# Patient Record
Sex: MALE | Race: WHITE | URBAN - METROPOLITAN AREA
[De-identification: names, ages, dates, MRNs, and addresses within clinical notes are randomized per-mention and may not be internally consistent; named-entity substitution may affect disease eponyms.]

---

## 2018-01-02 ENCOUNTER — APPOINTMENT (RX ONLY)
Dept: URBAN - METROPOLITAN AREA CLINIC 348 | Facility: CLINIC | Age: 58
Setting detail: DERMATOLOGY
End: 2018-01-02

## 2018-01-02 DIAGNOSIS — L21.8 OTHER SEBORRHEIC DERMATITIS: ICD-10-CM

## 2018-01-02 DIAGNOSIS — Q84.3 ANONYCHIA: ICD-10-CM

## 2018-01-02 PROCEDURE — 99202 OFFICE O/P NEW SF 15 MIN: CPT

## 2018-01-02 PROCEDURE — ? COUNSELING

## 2018-01-02 PROCEDURE — ? PRESCRIPTION

## 2018-01-02 PROCEDURE — ? TREATMENT REGIMEN

## 2018-01-02 RX ORDER — HYDROCORTISONE 25 MG/G
CREAM TOPICAL
Qty: 1 | Refills: 2 | Status: ERX | COMMUNITY
Start: 2018-01-02

## 2018-01-02 RX ORDER — KETOCONAZOLE 20.5 MG/ML
SHAMPOO, SUSPENSION TOPICAL
Qty: 1 | Refills: 5 | Status: ERX | COMMUNITY
Start: 2018-01-02

## 2018-01-02 RX ADMIN — KETOCONAZOLE: 20.5 SHAMPOO, SUSPENSION TOPICAL at 00:00

## 2018-01-02 RX ADMIN — HYDROCORTISONE: 25 CREAM TOPICAL at 00:00

## 2018-01-02 ASSESSMENT — LOCATION DETAILED DESCRIPTION DERM
LOCATION DETAILED: LEFT INFERIOR MEDIAL MALAR CHEEK
LOCATION DETAILED: LEFT GREAT TOENAIL
LOCATION DETAILED: LEFT CENTRAL PARIETAL SCALP
LOCATION DETAILED: RIGHT CENTRAL PARIETAL SCALP
LOCATION DETAILED: RIGHT SUPERIOR MEDIAL BUCCAL CHEEK

## 2018-01-02 ASSESSMENT — LOCATION ZONE DERM
LOCATION ZONE: FACE
LOCATION ZONE: TOENAIL
LOCATION ZONE: SCALP

## 2018-01-02 ASSESSMENT — LOCATION SIMPLE DESCRIPTION DERM
LOCATION SIMPLE: RIGHT CHEEK
LOCATION SIMPLE: LEFT GREAT TOE
LOCATION SIMPLE: LEFT CHEEK
LOCATION SIMPLE: SCALP

## 2018-01-02 NOTE — PROCEDURE: TREATMENT REGIMEN
Modify Regimen: Add: ketoconazole shampoo was face and scalp everyday\\nHydrocortisone 2.5% cream apply to affected areas twice a day for 4 weeks
Detail Level: Zone

## 2018-01-31 ENCOUNTER — APPOINTMENT (RX ONLY)
Dept: URBAN - METROPOLITAN AREA CLINIC 348 | Facility: CLINIC | Age: 58
Setting detail: DERMATOLOGY
End: 2018-01-31

## 2018-01-31 DIAGNOSIS — L21.8 OTHER SEBORRHEIC DERMATITIS: ICD-10-CM | Status: IMPROVED

## 2018-01-31 PROCEDURE — ? PRESCRIPTION

## 2018-01-31 PROCEDURE — ? COUNSELING

## 2018-01-31 PROCEDURE — 99213 OFFICE O/P EST LOW 20 MIN: CPT

## 2018-01-31 PROCEDURE — ? TREATMENT REGIMEN

## 2018-01-31 RX ORDER — HYDROCORTISONE 25 MG/G
CREAM TOPICAL
Qty: 1 | Refills: 6 | Status: ERX

## 2018-01-31 RX ORDER — KETOCONAZOLE 20.5 MG/ML
SHAMPOO, SUSPENSION TOPICAL
Qty: 1 | Refills: 6 | Status: ERX

## 2018-01-31 ASSESSMENT — LOCATION DETAILED DESCRIPTION DERM
LOCATION DETAILED: LEFT MEDIAL FOREHEAD
LOCATION DETAILED: NASAL DORSUM

## 2018-01-31 ASSESSMENT — LOCATION SIMPLE DESCRIPTION DERM
LOCATION SIMPLE: LEFT FOREHEAD
LOCATION SIMPLE: NOSE

## 2018-01-31 ASSESSMENT — LOCATION ZONE DERM
LOCATION ZONE: FACE
LOCATION ZONE: NOSE

## 2018-01-31 NOTE — PROCEDURE: TREATMENT REGIMEN
Detail Level: Zone
Continue Regimen: ketoconazole shampoo was face and scalp everyday\\nHydrocortisone 2.5% cream apply to affected areas twice a day for 4 weeks then as needed

## 2018-11-13 ENCOUNTER — APPOINTMENT (RX ONLY)
Dept: URBAN - METROPOLITAN AREA CLINIC 348 | Facility: CLINIC | Age: 58
Setting detail: DERMATOLOGY
End: 2018-11-13

## 2018-11-13 DIAGNOSIS — D22 MELANOCYTIC NEVI: ICD-10-CM

## 2018-11-13 DIAGNOSIS — L82.0 INFLAMED SEBORRHEIC KERATOSIS: ICD-10-CM

## 2018-11-13 DIAGNOSIS — L81.4 OTHER MELANIN HYPERPIGMENTATION: ICD-10-CM

## 2018-11-13 DIAGNOSIS — L82.1 OTHER SEBORRHEIC KERATOSIS: ICD-10-CM

## 2018-11-13 PROBLEM — D22.5 MELANOCYTIC NEVI OF TRUNK: Status: ACTIVE | Noted: 2018-11-13

## 2018-11-13 PROCEDURE — ? COUNSELING

## 2018-11-13 PROCEDURE — 17110 DESTRUCTION B9 LES UP TO 14: CPT

## 2018-11-13 PROCEDURE — 99213 OFFICE O/P EST LOW 20 MIN: CPT | Mod: 25

## 2018-11-13 PROCEDURE — ? LIQUID NITROGEN

## 2018-11-13 ASSESSMENT — LOCATION DETAILED DESCRIPTION DERM
LOCATION DETAILED: RIGHT MEDIAL ZYGOMA
LOCATION DETAILED: RIGHT CENTRAL ZYGOMA
LOCATION DETAILED: LEFT SUPERIOR UPPER BACK
LOCATION DETAILED: RIGHT MID-UPPER BACK

## 2018-11-13 ASSESSMENT — LOCATION ZONE DERM
LOCATION ZONE: FACE
LOCATION ZONE: TRUNK

## 2018-11-13 ASSESSMENT — LOCATION SIMPLE DESCRIPTION DERM
LOCATION SIMPLE: RIGHT ZYGOMA
LOCATION SIMPLE: RIGHT UPPER BACK
LOCATION SIMPLE: LEFT UPPER BACK

## 2018-11-13 NOTE — PROCEDURE: LIQUID NITROGEN
Medical Necessity Information: It is in your best interest to select a reason for this procedure from the list below. All of these items fulfill various CMS LCD requirements except the new and changing color options.
Detail Level: Detailed
Consent: The patient's consent was obtained including but not limited to risks of crusting, scabbing, blistering, scarring, darker or lighter pigmentary change, recurrence, incomplete removal and infection.
Add 52 Modifier (Optional): no
Number Of Freeze-Thaw Cycles: 1 freeze-thaw cycle
Medical Necessity Clause: This procedure was medically necessary because the lesions that were treated were:
Post-Care Instructions: I reviewed with the patient in detail post-care instructions. Patient is to wear sunprotection, and avoid picking at any of the treated lesions. Pt may apply Vaseline to crusted or scabbing areas.

## 2019-02-27 ENCOUNTER — APPOINTMENT (RX ONLY)
Dept: URBAN - METROPOLITAN AREA CLINIC 348 | Facility: CLINIC | Age: 59
Setting detail: DERMATOLOGY
End: 2019-02-27

## 2019-02-27 DIAGNOSIS — L81.4 OTHER MELANIN HYPERPIGMENTATION: ICD-10-CM

## 2019-02-27 DIAGNOSIS — L82.0 INFLAMED SEBORRHEIC KERATOSIS: ICD-10-CM

## 2019-02-27 DIAGNOSIS — D22 MELANOCYTIC NEVI: ICD-10-CM

## 2019-02-27 DIAGNOSIS — L82.1 OTHER SEBORRHEIC KERATOSIS: ICD-10-CM

## 2019-02-27 DIAGNOSIS — D18.0 HEMANGIOMA: ICD-10-CM

## 2019-02-27 DIAGNOSIS — D485 NEOPLASM OF UNCERTAIN BEHAVIOR OF SKIN: ICD-10-CM

## 2019-02-27 PROBLEM — D48.5 NEOPLASM OF UNCERTAIN BEHAVIOR OF SKIN: Status: ACTIVE | Noted: 2019-02-27

## 2019-02-27 PROBLEM — D22.5 MELANOCYTIC NEVI OF TRUNK: Status: ACTIVE | Noted: 2019-02-27

## 2019-02-27 PROBLEM — D18.01 HEMANGIOMA OF SKIN AND SUBCUTANEOUS TISSUE: Status: ACTIVE | Noted: 2019-02-27

## 2019-02-27 PROCEDURE — 11301 SHAVE SKIN LESION 0.6-1.0 CM: CPT | Mod: 59

## 2019-02-27 PROCEDURE — ? SHAVE REMOVAL

## 2019-02-27 PROCEDURE — ? COUNSELING

## 2019-02-27 PROCEDURE — 99213 OFFICE O/P EST LOW 20 MIN: CPT | Mod: 25

## 2019-02-27 PROCEDURE — ? LIQUID NITROGEN

## 2019-02-27 PROCEDURE — 17110 DESTRUCTION B9 LES UP TO 14: CPT

## 2019-02-27 ASSESSMENT — LOCATION DETAILED DESCRIPTION DERM
LOCATION DETAILED: SUPERIOR LUMBAR SPINE
LOCATION DETAILED: PERIUMBILICAL SKIN
LOCATION DETAILED: RIGHT INFERIOR LATERAL MIDBACK
LOCATION DETAILED: RIGHT LATERAL FOREHEAD
LOCATION DETAILED: SUPERIOR THORACIC SPINE
LOCATION DETAILED: LEFT MEDIAL SUPERIOR CHEST

## 2019-02-27 ASSESSMENT — LOCATION SIMPLE DESCRIPTION DERM
LOCATION SIMPLE: LOWER BACK
LOCATION SIMPLE: UPPER BACK
LOCATION SIMPLE: RIGHT LOWER BACK
LOCATION SIMPLE: RIGHT FOREHEAD
LOCATION SIMPLE: CHEST
LOCATION SIMPLE: ABDOMEN

## 2019-02-27 ASSESSMENT — LOCATION ZONE DERM
LOCATION ZONE: TRUNK
LOCATION ZONE: FACE

## 2019-02-27 NOTE — PROCEDURE: MIPS QUALITY
Quality 131: Pain Assessment And Follow-Up: Pain assessment using a standardized tool is documented as negative, no follow-up plan required
Quality 130: Documentation Of Current Medications In The Medical Record: Current Medications Documented
Detail Level: Detailed
Quality 226: Preventive Care And Screening: Tobacco Use: Screening And Cessation Intervention: Patient screened for tobacco use and is an ex/non-smoker
Quality 110: Preventive Care And Screening: Influenza Immunization: Influenza Immunization previously received during influenza season

## 2019-02-27 NOTE — PROCEDURE: SHAVE REMOVAL
Biopsy Method: double edge Personna blade
Anesthesia Type: 1% lidocaine without epinephrine
Wound Care: Aquaphor
Bill 49485 For Specimen Handling/Conveyance To Laboratory?: no
Size Of Lesion In Cm (Required): 1
X Size Of Lesion In Cm (Optional): 0
Path Notes (To The Dermatopathologist): Please check margins.
Post-Care Instructions: I reviewed with the patient in detail post-care instructions. Patient is to keep the biopsy site dry overnight, and then apply bacitracin twice daily until healed. Patient may apply hydrogen peroxide soaks to remove any crusting.
Notification Instructions: Patient will return to clinic in 2-4 weeks for biopsy results.
Hemostasis: Aluminum Chloride
Medical Necessity Clause: This procedure was medically necessary because the lesion that was treated was:
Consent was obtained from the patient. The risks and benefits to therapy were discussed in detail. Specifically, the risks of infection, scarring, bleeding, prolonged wound healing, incomplete removal, allergy to anesthesia, nerve injury and recurrence were addressed. Prior to the procedure, the treatment site was clearly identified and confirmed by the patient. All components of Universal Protocol/PAUSE Rule completed.
Billing Type: Third-Party Bill
Medical Necessity Information: It is in your best interest to select a reason for this procedure from the list below. All of these items fulfill various CMS LCD requirements except the new and changing color options.
Was A Bandage Applied: Yes
Detail Level: Detailed

## 2019-02-27 NOTE — PROCEDURE: LIQUID NITROGEN
Medical Necessity Clause: This procedure was medically necessary because the lesions that were treated were:
Number Of Freeze-Thaw Cycles: 1 freeze-thaw cycle
Include Z78.9 (Other Specified Conditions Influencing Health Status) As An Associated Diagnosis?: No
Detail Level: Detailed
Post-Care Instructions: I reviewed with the patient in detail post-care instructions. Patient is to wear sunprotection, and avoid picking at any of the treated lesions. Pt may apply Vaseline to crusted or scabbing areas.
Medical Necessity Information: It is in your best interest to select a reason for this procedure from the list below. All of these items fulfill various CMS LCD requirements except the new and changing color options.
Consent: The patient's consent was obtained including but not limited to risks of crusting, scabbing, blistering, scarring, darker or lighter pigmentary change, recurrence, incomplete removal and infection.

## 2019-03-13 ENCOUNTER — APPOINTMENT (RX ONLY)
Dept: URBAN - METROPOLITAN AREA CLINIC 348 | Facility: CLINIC | Age: 59
Setting detail: DERMATOLOGY
End: 2019-03-13

## 2019-03-13 PROBLEM — C43.59 MALIGNANT MELANOMA OF OTHER PART OF TRUNK: Status: ACTIVE | Noted: 2019-03-13

## 2019-03-13 PROCEDURE — ? CONSULTATION EXCISION

## 2019-03-13 PROCEDURE — ? DEFER

## 2019-03-13 PROCEDURE — ? COUNSELING

## 2019-03-13 PROCEDURE — 99213 OFFICE O/P EST LOW 20 MIN: CPT

## 2019-03-13 NOTE — PROCEDURE: MIPS QUALITY
Quality 397: Melanoma: Reporting: Pathology does not include pT category and/or statement on Breslow depth, ulceration, and pT1 mitotic reporting.
Quality 138: Melanoma: Coordination Of Care: A treatment plan was communicated to the physicians providing continuing care within one month of diagnosis outlining: diagnosis, tumor thickness and a plan for surgery or alternate care.
Quality 137: Melanoma: Continuity Of Care - Recall System: Patient information entered into a recall system that includes: target date for the next exam specified AND a process to follow up with patients regarding missed or unscheduled appointments
Detail Level: Detailed
Quality 130: Documentation Of Current Medications In The Medical Record: Current Medications Documented
Quality 265: Biopsy Follow-Up: Biopsy results reviewed, communicated, tracked, and documented

## 2019-03-21 ENCOUNTER — APPOINTMENT (RX ONLY)
Dept: URBAN - METROPOLITAN AREA CLINIC 348 | Facility: CLINIC | Age: 59
Setting detail: DERMATOLOGY
End: 2019-03-21

## 2019-03-21 PROBLEM — C43.59 MALIGNANT MELANOMA OF OTHER PART OF TRUNK: Status: ACTIVE | Noted: 2019-03-21

## 2019-03-21 PROCEDURE — 12032 INTMD RPR S/A/T/EXT 2.6-7.5: CPT

## 2019-03-21 PROCEDURE — ? EXCISION

## 2019-03-21 PROCEDURE — 11604 EXC TR-EXT MAL+MARG 3.1-4 CM: CPT

## 2019-04-04 ENCOUNTER — APPOINTMENT (RX ONLY)
Dept: URBAN - METROPOLITAN AREA CLINIC 348 | Facility: CLINIC | Age: 59
Setting detail: DERMATOLOGY
End: 2019-04-04

## 2019-04-04 DIAGNOSIS — Z48.01 ENCOUNTER FOR CHANGE OR REMOVAL OF SURGICAL WOUND DRESSING: ICD-10-CM

## 2019-04-04 PROCEDURE — ? SUTURE REMOVAL (GLOBAL PERIOD)

## 2019-04-04 ASSESSMENT — LOCATION DETAILED DESCRIPTION DERM: LOCATION DETAILED: RIGHT INFERIOR LATERAL MIDBACK

## 2019-04-04 ASSESSMENT — LOCATION SIMPLE DESCRIPTION DERM: LOCATION SIMPLE: RIGHT LOWER BACK

## 2019-04-04 ASSESSMENT — LOCATION ZONE DERM: LOCATION ZONE: TRUNK

## 2019-04-04 NOTE — PROCEDURE: SUTURE REMOVAL (GLOBAL PERIOD)
Add 20046 Cpt? (Important Note: In 2017 The Use Of 99106 Is Being Tracked By Cms To Determine Future Global Period Reimbursement For Global Periods): no
Detail Level: Detailed

## 2019-07-11 ENCOUNTER — APPOINTMENT (RX ONLY)
Dept: URBAN - METROPOLITAN AREA CLINIC 348 | Facility: CLINIC | Age: 59
Setting detail: DERMATOLOGY
End: 2019-07-11

## 2019-07-11 DIAGNOSIS — Z85.820 PERSONAL HISTORY OF MALIGNANT MELANOMA OF SKIN: ICD-10-CM

## 2019-07-11 DIAGNOSIS — D18.0 HEMANGIOMA: ICD-10-CM

## 2019-07-11 DIAGNOSIS — L738 OTHER SPECIFIED DISEASES OF HAIR AND HAIR FOLLICLES: ICD-10-CM

## 2019-07-11 DIAGNOSIS — L663 OTHER SPECIFIED DISEASES OF HAIR AND HAIR FOLLICLES: ICD-10-CM

## 2019-07-11 DIAGNOSIS — L81.4 OTHER MELANIN HYPERPIGMENTATION: ICD-10-CM

## 2019-07-11 DIAGNOSIS — L82.0 INFLAMED SEBORRHEIC KERATOSIS: ICD-10-CM

## 2019-07-11 DIAGNOSIS — L82.1 OTHER SEBORRHEIC KERATOSIS: ICD-10-CM

## 2019-07-11 DIAGNOSIS — D22 MELANOCYTIC NEVI: ICD-10-CM

## 2019-07-11 DIAGNOSIS — L57.0 ACTINIC KERATOSIS: ICD-10-CM

## 2019-07-11 DIAGNOSIS — L73.9 FOLLICULAR DISORDER, UNSPECIFIED: ICD-10-CM

## 2019-07-11 PROBLEM — D22.5 MELANOCYTIC NEVI OF TRUNK: Status: ACTIVE | Noted: 2019-07-11

## 2019-07-11 PROBLEM — L02.32 FURUNCLE OF BUTTOCK: Status: ACTIVE | Noted: 2019-07-11

## 2019-07-11 PROBLEM — D18.01 HEMANGIOMA OF SKIN AND SUBCUTANEOUS TISSUE: Status: ACTIVE | Noted: 2019-07-11

## 2019-07-11 PROCEDURE — ? LIQUID NITROGEN

## 2019-07-11 PROCEDURE — ? COUNSELING

## 2019-07-11 PROCEDURE — ? PRESCRIPTION

## 2019-07-11 PROCEDURE — ? OTHER

## 2019-07-11 PROCEDURE — 17000 DESTRUCT PREMALG LESION: CPT | Mod: 59

## 2019-07-11 PROCEDURE — 99213 OFFICE O/P EST LOW 20 MIN: CPT | Mod: 25

## 2019-07-11 PROCEDURE — ? TREATMENT REGIMEN

## 2019-07-11 PROCEDURE — 17110 DESTRUCTION B9 LES UP TO 14: CPT

## 2019-07-11 RX ORDER — NYSTATIN 100000 1/G
POWDER TOPICAL
Qty: 1 | Refills: 4 | Status: ERX | COMMUNITY
Start: 2019-07-11

## 2019-07-11 RX ADMIN — NYSTATIN: 100000 POWDER TOPICAL at 00:00

## 2019-07-11 ASSESSMENT — LOCATION SIMPLE DESCRIPTION DERM
LOCATION SIMPLE: RIGHT FOREARM
LOCATION SIMPLE: RIGHT LOWER BACK
LOCATION SIMPLE: RIGHT FOREHEAD
LOCATION SIMPLE: LEFT UPPER BACK
LOCATION SIMPLE: RIGHT BUTTOCK
LOCATION SIMPLE: CHEST

## 2019-07-11 ASSESSMENT — LOCATION DETAILED DESCRIPTION DERM
LOCATION DETAILED: LEFT MEDIAL INFERIOR CHEST
LOCATION DETAILED: RIGHT INFERIOR LATERAL MIDBACK
LOCATION DETAILED: LEFT SUPERIOR UPPER BACK
LOCATION DETAILED: RIGHT DISTAL DORSAL FOREARM
LOCATION DETAILED: LEFT MEDIAL UPPER BACK
LOCATION DETAILED: RIGHT BUTTOCK
LOCATION DETAILED: RIGHT INFERIOR FOREHEAD

## 2019-07-11 ASSESSMENT — LOCATION ZONE DERM
LOCATION ZONE: TRUNK
LOCATION ZONE: ARM
LOCATION ZONE: FACE

## 2019-07-11 NOTE — PROCEDURE: LIQUID NITROGEN
Render Post-Care Instructions In Note?: no
Post-Care Instructions: I reviewed with the patient in detail post-care instructions. Patient is to wear sunprotection, and avoid picking at any of the treated lesions. Pt may apply Vaseline to crusted or scabbing areas.
Medical Necessity Clause: This procedure was medically necessary because the lesions that were treated were:
Consent: The patient's consent was obtained including but not limited to risks of crusting, scabbing, blistering, scarring, darker or lighter pigmentary change, recurrence, incomplete removal and infection.
Detail Level: Detailed
Medical Necessity Information: It is in your best interest to select a reason for this procedure from the list below. All of these items fulfill various CMS LCD requirements except the new and changing color options.
Number Of Freeze-Thaw Cycles: 1 freeze-thaw cycle
Duration Of Freeze Thaw-Cycle (Seconds): 10

## 2019-07-11 NOTE — PROCEDURE: TREATMENT REGIMEN
Detail Level: Zone
Initiate Treatment: Nystatin powder daily to affected areas
Otc Regimen: Zeasorb daily to affected areas \\nKeep area cool and dry

## 2019-07-11 NOTE — PROCEDURE: OTHER
Note Text (......Xxx Chief Complaint.): This diagnosis correlates with the
Other (Free Text): Melanoma right inferior lateral mid back excised by wide local excision by ERICKSON 3/2019
Detail Level: Detailed

## 2019-10-03 ENCOUNTER — APPOINTMENT (RX ONLY)
Dept: URBAN - METROPOLITAN AREA CLINIC 348 | Facility: CLINIC | Age: 59
Setting detail: DERMATOLOGY
End: 2019-10-03

## 2019-10-03 DIAGNOSIS — D18.0 HEMANGIOMA: ICD-10-CM

## 2019-10-03 DIAGNOSIS — Z85.820 PERSONAL HISTORY OF MALIGNANT MELANOMA OF SKIN: ICD-10-CM

## 2019-10-03 DIAGNOSIS — L81.4 OTHER MELANIN HYPERPIGMENTATION: ICD-10-CM

## 2019-10-03 DIAGNOSIS — L30.0 NUMMULAR DERMATITIS: ICD-10-CM

## 2019-10-03 DIAGNOSIS — L82.1 OTHER SEBORRHEIC KERATOSIS: ICD-10-CM

## 2019-10-03 DIAGNOSIS — D22 MELANOCYTIC NEVI: ICD-10-CM

## 2019-10-03 PROBLEM — D22.5 MELANOCYTIC NEVI OF TRUNK: Status: ACTIVE | Noted: 2019-10-03

## 2019-10-03 PROBLEM — D18.01 HEMANGIOMA OF SKIN AND SUBCUTANEOUS TISSUE: Status: ACTIVE | Noted: 2019-10-03

## 2019-10-03 PROCEDURE — ? TREATMENT REGIMEN

## 2019-10-03 PROCEDURE — ? COUNSELING

## 2019-10-03 PROCEDURE — ? PRESCRIPTION

## 2019-10-03 PROCEDURE — 99213 OFFICE O/P EST LOW 20 MIN: CPT

## 2019-10-03 PROCEDURE — ? OTHER

## 2019-10-03 RX ORDER — TRIAMCINOLONE ACETONIDE 1 MG/G
CREAM TOPICAL
Qty: 1 | Refills: 1 | Status: ERX | COMMUNITY
Start: 2019-10-03

## 2019-10-03 RX ADMIN — TRIAMCINOLONE ACETONIDE: 1 CREAM TOPICAL at 00:00

## 2019-10-03 ASSESSMENT — LOCATION DETAILED DESCRIPTION DERM
LOCATION DETAILED: RIGHT DISTAL DORSAL FOREARM
LOCATION DETAILED: RIGHT INFERIOR LATERAL MIDBACK
LOCATION DETAILED: LEFT MEDIAL UPPER BACK
LOCATION DETAILED: LEFT MEDIAL INFERIOR CHEST
LOCATION DETAILED: LEFT SUPERIOR UPPER BACK
LOCATION DETAILED: LEFT DISTAL DORSAL FOREARM

## 2019-10-03 ASSESSMENT — LOCATION SIMPLE DESCRIPTION DERM
LOCATION SIMPLE: LEFT UPPER BACK
LOCATION SIMPLE: CHEST
LOCATION SIMPLE: RIGHT FOREARM
LOCATION SIMPLE: LEFT FOREARM
LOCATION SIMPLE: RIGHT LOWER BACK

## 2019-10-03 ASSESSMENT — LOCATION ZONE DERM
LOCATION ZONE: ARM
LOCATION ZONE: TRUNK

## 2019-10-03 NOTE — PROCEDURE: OTHER
Detail Level: Detailed
Note Text (......Xxx Chief Complaint.): This diagnosis correlates with the
Other (Free Text): Melanoma right inferior lateral mid back excised by wide local excision by ERICKSON 3/2019

## 2019-10-03 NOTE — PROCEDURE: TREATMENT REGIMEN
Initiate Treatment: Triamcinolone- Apply twice daily to the areas of the wrists as needed for itch
Detail Level: Zone

## 2019-10-03 NOTE — PROCEDURE: MIPS QUALITY
Quality 130: Documentation Of Current Medications In The Medical Record: Current Medications Documented
Detail Level: Detailed
Quality 138: Melanoma: Coordination Of Care: A treatment plan was communicated to the physicians providing continuing care within one month of diagnosis outlining: diagnosis, tumor thickness and a plan for surgery or alternate care.
Quality 137: Melanoma: Continuity Of Care - Recall System: Patient information entered into a recall system that includes: target date for the next exam specified AND a process to follow up with patients regarding missed or unscheduled appointments
Quality 265: Biopsy Follow-Up: Biopsy results reviewed, communicated, tracked, and documented

## 2020-06-02 ENCOUNTER — APPOINTMENT (RX ONLY)
Dept: URBAN - METROPOLITAN AREA CLINIC 348 | Facility: CLINIC | Age: 60
Setting detail: DERMATOLOGY
End: 2020-06-02

## 2020-06-02 DIAGNOSIS — L81.4 OTHER MELANIN HYPERPIGMENTATION: ICD-10-CM

## 2020-06-02 DIAGNOSIS — Z85.820 PERSONAL HISTORY OF MALIGNANT MELANOMA OF SKIN: ICD-10-CM

## 2020-06-02 DIAGNOSIS — B35.3 TINEA PEDIS: ICD-10-CM

## 2020-06-02 DIAGNOSIS — L82.1 OTHER SEBORRHEIC KERATOSIS: ICD-10-CM

## 2020-06-02 DIAGNOSIS — D18.0 HEMANGIOMA: ICD-10-CM

## 2020-06-02 DIAGNOSIS — D22 MELANOCYTIC NEVI: ICD-10-CM

## 2020-06-02 PROBLEM — D18.01 HEMANGIOMA OF SKIN AND SUBCUTANEOUS TISSUE: Status: ACTIVE | Noted: 2020-06-02

## 2020-06-02 PROBLEM — D22.5 MELANOCYTIC NEVI OF TRUNK: Status: ACTIVE | Noted: 2020-06-02

## 2020-06-02 PROCEDURE — ? TREATMENT REGIMEN

## 2020-06-02 PROCEDURE — 99214 OFFICE O/P EST MOD 30 MIN: CPT

## 2020-06-02 PROCEDURE — ? COUNSELING

## 2020-06-02 PROCEDURE — ? OTHER

## 2020-06-02 ASSESSMENT — LOCATION SIMPLE DESCRIPTION DERM
LOCATION SIMPLE: RIGHT LOWER BACK
LOCATION SIMPLE: LEFT PLANTAR SURFACE
LOCATION SIMPLE: RIGHT PLANTAR SURFACE
LOCATION SIMPLE: LEFT UPPER BACK
LOCATION SIMPLE: CHEST

## 2020-06-02 ASSESSMENT — LOCATION DETAILED DESCRIPTION DERM
LOCATION DETAILED: RIGHT MEDIAL PLANTAR MIDFOOT
LOCATION DETAILED: LEFT SUPERIOR UPPER BACK
LOCATION DETAILED: LEFT MEDIAL UPPER BACK
LOCATION DETAILED: LEFT MEDIAL PLANTAR MIDFOOT
LOCATION DETAILED: LEFT MEDIAL INFERIOR CHEST
LOCATION DETAILED: RIGHT INFERIOR LATERAL MIDBACK

## 2020-06-02 ASSESSMENT — LOCATION ZONE DERM
LOCATION ZONE: FEET
LOCATION ZONE: TRUNK

## 2020-06-02 NOTE — PROCEDURE: MIPS QUALITY
Detail Level: Detailed
Quality 137: Melanoma: Continuity Of Care - Recall System: Patient information entered into a recall system that includes: target date for the next exam specified AND a process to follow up with patients regarding missed or unscheduled appointments
Quality 130: Documentation Of Current Medications In The Medical Record: Current Medications with Name, Dosage, Frequency, or Route not Documented, Reason not Given

## 2020-06-02 NOTE — PROCEDURE: OTHER
Other (Free Text): Melanoma right inferior lateral mid back excised by wide local excision by ERICKSON 3/2019
Detail Level: Detailed
Note Text (......Xxx Chief Complaint.): This diagnosis correlates with the

## 2020-12-03 ENCOUNTER — APPOINTMENT (RX ONLY)
Dept: URBAN - METROPOLITAN AREA CLINIC 348 | Facility: CLINIC | Age: 60
Setting detail: DERMATOLOGY
End: 2020-12-03

## 2020-12-03 DIAGNOSIS — D18.0 HEMANGIOMA: ICD-10-CM

## 2020-12-03 DIAGNOSIS — S31000A OPEN WOUND(S) (MULTIPLE) OF UNSPECIFIED SITE(S), WITHOUT MENTION OF COMPLICATION: ICD-10-CM

## 2020-12-03 DIAGNOSIS — D22 MELANOCYTIC NEVI: ICD-10-CM

## 2020-12-03 DIAGNOSIS — L81.4 OTHER MELANIN HYPERPIGMENTATION: ICD-10-CM

## 2020-12-03 DIAGNOSIS — L82.1 OTHER SEBORRHEIC KERATOSIS: ICD-10-CM

## 2020-12-03 DIAGNOSIS — Z85.820 PERSONAL HISTORY OF MALIGNANT MELANOMA OF SKIN: ICD-10-CM

## 2020-12-03 PROBLEM — D18.01 HEMANGIOMA OF SKIN AND SUBCUTANEOUS TISSUE: Status: ACTIVE | Noted: 2020-12-03

## 2020-12-03 PROBLEM — D22.5 MELANOCYTIC NEVI OF TRUNK: Status: ACTIVE | Noted: 2020-12-03

## 2020-12-03 PROBLEM — S81.812A LACERATION WITHOUT FOREIGN BODY, LEFT LOWER LEG, INITIAL ENCOUNTER: Status: ACTIVE | Noted: 2020-12-03

## 2020-12-03 PROCEDURE — ? OTHER

## 2020-12-03 PROCEDURE — 99214 OFFICE O/P EST MOD 30 MIN: CPT

## 2020-12-03 PROCEDURE — ? COUNSELING

## 2020-12-03 ASSESSMENT — LOCATION ZONE DERM
LOCATION ZONE: LEG
LOCATION ZONE: TRUNK

## 2020-12-03 ASSESSMENT — LOCATION SIMPLE DESCRIPTION DERM
LOCATION SIMPLE: RIGHT LOWER BACK
LOCATION SIMPLE: LEFT UPPER BACK
LOCATION SIMPLE: LEFT PRETIBIAL REGION
LOCATION SIMPLE: CHEST

## 2020-12-03 ASSESSMENT — LOCATION DETAILED DESCRIPTION DERM
LOCATION DETAILED: LEFT MEDIAL UPPER BACK
LOCATION DETAILED: RIGHT INFERIOR LATERAL MIDBACK
LOCATION DETAILED: LEFT SUPERIOR UPPER BACK
LOCATION DETAILED: LEFT MEDIAL INFERIOR CHEST
LOCATION DETAILED: LEFT DISTAL PRETIBIAL REGION

## 2020-12-03 NOTE — PROCEDURE: MIPS QUALITY
Quality 137: Melanoma: Continuity Of Care - Recall System: Patient information entered into a recall system that includes: target date for the next exam specified AND a process to follow up with patients regarding missed or unscheduled appointments
Detail Level: Detailed
Quality 130: Documentation Of Current Medications In The Medical Record: Current Medications with Name, Dosage, Frequency, or Route not Documented, Reason not Given
Quality 110: Preventive Care And Screening: Influenza Immunization: Influenza Immunization Administered during Influenza season

## 2020-12-03 NOTE — PROCEDURE: OTHER
Detail Level: Detailed
Other (Free Text): Melanoma right inferior lateral mid back excised by wide local excision by ERICKSON 3/2019
Note Text (......Xxx Chief Complaint.): This diagnosis correlates with the

## 2021-05-06 NOTE — PROCEDURE: EXCISION
Detail Level: Detailed Trilobed Flap Text: The defect edges were debeveled with a #15 scalpel blade.  Given the location of the defect and the proximity to free margins a trilobed flap was deemed most appropriate.  Using a sterile surgical marker, an appropriate trilobed flap drawn around the defect.    The area thus outlined was incised deep to adipose tissue with a #15 scalpel blade.  The skin margins were undermined to an appropriate distance in all directions utilizing iris scissors.

## 2021-06-03 ENCOUNTER — APPOINTMENT (RX ONLY)
Dept: URBAN - METROPOLITAN AREA CLINIC 348 | Facility: CLINIC | Age: 61
Setting detail: DERMATOLOGY
End: 2021-06-03

## 2021-06-03 DIAGNOSIS — D22 MELANOCYTIC NEVI: ICD-10-CM

## 2021-06-03 DIAGNOSIS — L82.1 OTHER SEBORRHEIC KERATOSIS: ICD-10-CM

## 2021-06-03 DIAGNOSIS — Z85.820 PERSONAL HISTORY OF MALIGNANT MELANOMA OF SKIN: ICD-10-CM

## 2021-06-03 DIAGNOSIS — D18.0 HEMANGIOMA: ICD-10-CM

## 2021-06-03 DIAGNOSIS — L81.4 OTHER MELANIN HYPERPIGMENTATION: ICD-10-CM

## 2021-06-03 PROBLEM — D18.01 HEMANGIOMA OF SKIN AND SUBCUTANEOUS TISSUE: Status: ACTIVE | Noted: 2021-06-03

## 2021-06-03 PROBLEM — D22.5 MELANOCYTIC NEVI OF TRUNK: Status: ACTIVE | Noted: 2021-06-03

## 2021-06-03 PROCEDURE — ? OTHER

## 2021-06-03 PROCEDURE — 99213 OFFICE O/P EST LOW 20 MIN: CPT

## 2021-06-03 PROCEDURE — ? COUNSELING

## 2021-06-03 ASSESSMENT — LOCATION DETAILED DESCRIPTION DERM
LOCATION DETAILED: LEFT SUPERIOR UPPER BACK
LOCATION DETAILED: LEFT LATERAL UPPER BACK
LOCATION DETAILED: LEFT MEDIAL INFERIOR CHEST
LOCATION DETAILED: RIGHT INFERIOR LATERAL MIDBACK

## 2021-06-03 ASSESSMENT — LOCATION ZONE DERM: LOCATION ZONE: TRUNK

## 2021-06-03 ASSESSMENT — LOCATION SIMPLE DESCRIPTION DERM
LOCATION SIMPLE: RIGHT LOWER BACK
LOCATION SIMPLE: CHEST
LOCATION SIMPLE: LEFT UPPER BACK

## 2021-12-02 ENCOUNTER — APPOINTMENT (RX ONLY)
Dept: URBAN - METROPOLITAN AREA CLINIC 348 | Facility: CLINIC | Age: 61
Setting detail: DERMATOLOGY
End: 2021-12-02

## 2021-12-02 DIAGNOSIS — B35.4 TINEA CORPORIS: ICD-10-CM

## 2021-12-02 DIAGNOSIS — L82.1 OTHER SEBORRHEIC KERATOSIS: ICD-10-CM

## 2021-12-02 DIAGNOSIS — D18.0 HEMANGIOMA: ICD-10-CM

## 2021-12-02 DIAGNOSIS — L81.4 OTHER MELANIN HYPERPIGMENTATION: ICD-10-CM

## 2021-12-02 DIAGNOSIS — D22 MELANOCYTIC NEVI: ICD-10-CM

## 2021-12-02 DIAGNOSIS — Z85.820 PERSONAL HISTORY OF MALIGNANT MELANOMA OF SKIN: ICD-10-CM

## 2021-12-02 DIAGNOSIS — B35.3 TINEA PEDIS: ICD-10-CM

## 2021-12-02 PROBLEM — D22.5 MELANOCYTIC NEVI OF TRUNK: Status: ACTIVE | Noted: 2021-12-02

## 2021-12-02 PROBLEM — D18.01 HEMANGIOMA OF SKIN AND SUBCUTANEOUS TISSUE: Status: ACTIVE | Noted: 2021-12-02

## 2021-12-02 PROCEDURE — ? PRESCRIPTION

## 2021-12-02 PROCEDURE — ? TREATMENT REGIMEN

## 2021-12-02 PROCEDURE — ? COUNSELING

## 2021-12-02 PROCEDURE — 99213 OFFICE O/P EST LOW 20 MIN: CPT

## 2021-12-02 PROCEDURE — ? OTHER

## 2021-12-02 RX ORDER — KETOCONAZOLE 20 MG/G
CREAM TOPICAL
Qty: 60 | Refills: 2 | Status: ERX | COMMUNITY
Start: 2021-12-02

## 2021-12-02 RX ADMIN — KETOCONAZOLE: 20 CREAM TOPICAL at 00:00

## 2021-12-02 ASSESSMENT — LOCATION ZONE DERM
LOCATION ZONE: FEET
LOCATION ZONE: ARM
LOCATION ZONE: TRUNK

## 2021-12-02 ASSESSMENT — LOCATION DETAILED DESCRIPTION DERM
LOCATION DETAILED: RIGHT DISTAL DORSAL FOREARM
LOCATION DETAILED: RIGHT INFERIOR LATERAL MIDBACK
LOCATION DETAILED: LEFT MEDIAL INFERIOR CHEST
LOCATION DETAILED: LEFT SUPERIOR UPPER BACK
LOCATION DETAILED: LEFT DORSAL FOOT
LOCATION DETAILED: LEFT LATERAL UPPER BACK

## 2021-12-02 ASSESSMENT — LOCATION SIMPLE DESCRIPTION DERM
LOCATION SIMPLE: CHEST
LOCATION SIMPLE: RIGHT FOREARM
LOCATION SIMPLE: LEFT UPPER BACK
LOCATION SIMPLE: LEFT FOOT
LOCATION SIMPLE: RIGHT LOWER BACK

## 2021-12-02 NOTE — PROCEDURE: TREATMENT REGIMEN
Detail Level: Zone
Initiate Treatment: Add ketoconazole cream twice a day x four weeks
Initiate Treatment: Add ketoconazole cream twice a day x four weeks.

## 2022-02-01 ENCOUNTER — APPOINTMENT (RX ONLY)
Dept: URBAN - METROPOLITAN AREA CLINIC 348 | Facility: CLINIC | Age: 62
Setting detail: DERMATOLOGY
End: 2022-02-01

## 2022-02-01 DIAGNOSIS — B35.4 TINEA CORPORIS: ICD-10-CM

## 2022-02-01 DIAGNOSIS — B35.3 TINEA PEDIS: ICD-10-CM | Status: WELL CONTROLLED

## 2022-02-01 PROCEDURE — ? TREATMENT REGIMEN

## 2022-02-01 PROCEDURE — ? COUNSELING

## 2022-02-01 PROCEDURE — ? PRESCRIPTION

## 2022-02-01 PROCEDURE — 99213 OFFICE O/P EST LOW 20 MIN: CPT

## 2022-02-01 RX ORDER — KETOCONAZOLE 20 MG/G
CREAM TOPICAL
Qty: 30 | Refills: 3 | Status: ERX

## 2022-02-01 ASSESSMENT — LOCATION ZONE DERM
LOCATION ZONE: FEET
LOCATION ZONE: ARM

## 2022-02-01 ASSESSMENT — LOCATION DETAILED DESCRIPTION DERM
LOCATION DETAILED: RIGHT DISTAL DORSAL FOREARM
LOCATION DETAILED: LEFT DORSAL FOOT

## 2022-02-01 ASSESSMENT — LOCATION SIMPLE DESCRIPTION DERM
LOCATION SIMPLE: RIGHT FOREARM
LOCATION SIMPLE: LEFT FOOT

## 2022-02-01 NOTE — PROCEDURE: TREATMENT REGIMEN
Detail Level: Zone
Continue Regimen: If flared, ketoconazole cream twice a day x four weeks.
Continue Regimen: Ketoconazole 2% cream- Apply to affected area on the right forearm twice daily for four weeks

## 2022-02-03 RX ORDER — KETOCONAZOLE 20 MG/G
CREAM TOPICAL
Qty: 30 | Refills: 3 | Status: ERX

## 2022-03-01 ENCOUNTER — APPOINTMENT (RX ONLY)
Dept: URBAN - METROPOLITAN AREA CLINIC 341 | Facility: CLINIC | Age: 62
Setting detail: DERMATOLOGY
End: 2022-03-01

## 2022-03-01 DIAGNOSIS — D69.2 OTHER NONTHROMBOCYTOPENIC PURPURA: ICD-10-CM

## 2022-03-01 DIAGNOSIS — B35.3 TINEA PEDIS: ICD-10-CM | Status: RESOLVED

## 2022-03-01 DIAGNOSIS — B35.4 TINEA CORPORIS: ICD-10-CM | Status: RESOLVED

## 2022-03-01 PROCEDURE — 99213 OFFICE O/P EST LOW 20 MIN: CPT

## 2022-03-01 PROCEDURE — ? COUNSELING

## 2022-03-01 PROCEDURE — ? PRESCRIPTION MEDICATION MANAGEMENT

## 2022-03-01 ASSESSMENT — LOCATION ZONE DERM: LOCATION ZONE: ARM

## 2022-03-01 ASSESSMENT — LOCATION DETAILED DESCRIPTION DERM: LOCATION DETAILED: LEFT PROXIMAL DORSAL FOREARM

## 2022-03-01 ASSESSMENT — LOCATION SIMPLE DESCRIPTION DERM: LOCATION SIMPLE: LEFT FOREARM

## 2022-06-02 ENCOUNTER — APPOINTMENT (RX ONLY)
Dept: URBAN - METROPOLITAN AREA CLINIC 341 | Facility: CLINIC | Age: 62
Setting detail: DERMATOLOGY
End: 2022-06-02

## 2022-06-02 DIAGNOSIS — L81.4 OTHER MELANIN HYPERPIGMENTATION: ICD-10-CM

## 2022-06-02 DIAGNOSIS — D485 NEOPLASM OF UNCERTAIN BEHAVIOR OF SKIN: ICD-10-CM

## 2022-06-02 DIAGNOSIS — D18.0 HEMANGIOMA: ICD-10-CM

## 2022-06-02 DIAGNOSIS — Z85.820 PERSONAL HISTORY OF MALIGNANT MELANOMA OF SKIN: ICD-10-CM

## 2022-06-02 DIAGNOSIS — D22 MELANOCYTIC NEVI: ICD-10-CM

## 2022-06-02 DIAGNOSIS — L82.1 OTHER SEBORRHEIC KERATOSIS: ICD-10-CM

## 2022-06-02 PROBLEM — D22.5 MELANOCYTIC NEVI OF TRUNK: Status: ACTIVE | Noted: 2022-06-02

## 2022-06-02 PROBLEM — D18.01 HEMANGIOMA OF SKIN AND SUBCUTANEOUS TISSUE: Status: ACTIVE | Noted: 2022-06-02

## 2022-06-02 PROBLEM — D48.5 NEOPLASM OF UNCERTAIN BEHAVIOR OF SKIN: Status: ACTIVE | Noted: 2022-06-02

## 2022-06-02 PROCEDURE — ? OTC TREATMENT REGIMEN

## 2022-06-02 PROCEDURE — 11102 TANGNTL BX SKIN SINGLE LES: CPT

## 2022-06-02 PROCEDURE — ? OTHER

## 2022-06-02 PROCEDURE — ? BIOPSY BY SHAVE METHOD

## 2022-06-02 PROCEDURE — 99213 OFFICE O/P EST LOW 20 MIN: CPT | Mod: 25

## 2022-06-02 PROCEDURE — ? COUNSELING

## 2022-06-02 ASSESSMENT — LOCATION ZONE DERM
LOCATION ZONE: ARM
LOCATION ZONE: TRUNK

## 2022-06-02 ASSESSMENT — LOCATION SIMPLE DESCRIPTION DERM
LOCATION SIMPLE: RIGHT LOWER BACK
LOCATION SIMPLE: ABDOMEN
LOCATION SIMPLE: RIGHT WRIST

## 2022-06-02 ASSESSMENT — LOCATION DETAILED DESCRIPTION DERM
LOCATION DETAILED: EPIGASTRIC SKIN
LOCATION DETAILED: PERIUMBILICAL SKIN
LOCATION DETAILED: RIGHT INFERIOR LATERAL MIDBACK
LOCATION DETAILED: RIGHT DORSAL WRIST

## 2022-06-23 ENCOUNTER — APPOINTMENT (RX ONLY)
Dept: URBAN - METROPOLITAN AREA CLINIC 341 | Facility: CLINIC | Age: 62
Setting detail: DERMATOLOGY
End: 2022-06-23

## 2022-06-23 PROBLEM — D04.61 CARCINOMA IN SITU OF SKIN OF RIGHT UPPER LIMB, INCLUDING SHOULDER: Status: ACTIVE | Noted: 2022-06-23

## 2022-06-23 PROCEDURE — ? PRESCRIPTION

## 2022-06-23 PROCEDURE — ? COUNSELING

## 2022-06-23 PROCEDURE — ? CURETTAGE AND DESTRUCTION

## 2022-06-23 PROCEDURE — 17262 DSTRJ MAL LES T/A/L 1.1-2.0: CPT

## 2022-06-23 PROCEDURE — ? PATHOLOGY DISCUSSION

## 2022-06-23 RX ORDER — MINERAL OIL/HYDROPHIL PETROLAT
OINTMENT (GRAM) TOPICAL
Qty: 50 | Refills: 0 | Status: ERX | COMMUNITY
Start: 2022-06-23

## 2022-06-23 RX ADMIN — Medication: at 00:00

## 2022-06-23 NOTE — PROCEDURE: CURETTAGE AND DESTRUCTION

## 2022-07-14 ENCOUNTER — APPOINTMENT (RX ONLY)
Dept: URBAN - METROPOLITAN AREA CLINIC 341 | Facility: CLINIC | Age: 62
Setting detail: DERMATOLOGY
End: 2022-07-14

## 2022-07-14 PROBLEM — D04.61 CARCINOMA IN SITU OF SKIN OF RIGHT UPPER LIMB, INCLUDING SHOULDER: Status: ACTIVE | Noted: 2022-07-14

## 2022-07-14 PROCEDURE — ? COUNSELING

## 2022-07-14 PROCEDURE — 99213 OFFICE O/P EST LOW 20 MIN: CPT

## 2022-07-14 PROCEDURE — ? TREATMENT REGIMEN

## 2022-08-05 ENCOUNTER — APPOINTMENT (RX ONLY)
Dept: URBAN - METROPOLITAN AREA CLINIC 341 | Facility: CLINIC | Age: 62
Setting detail: DERMATOLOGY
End: 2022-08-05

## 2022-08-05 PROBLEM — D04.61 CARCINOMA IN SITU OF SKIN OF RIGHT UPPER LIMB, INCLUDING SHOULDER: Status: ACTIVE | Noted: 2022-08-05

## 2022-08-05 PROCEDURE — 99213 OFFICE O/P EST LOW 20 MIN: CPT

## 2022-08-05 PROCEDURE — ? COUNSELING

## 2022-09-01 ENCOUNTER — APPOINTMENT (RX ONLY)
Dept: URBAN - METROPOLITAN AREA CLINIC 341 | Facility: CLINIC | Age: 62
Setting detail: DERMATOLOGY
End: 2022-09-01

## 2022-09-01 DIAGNOSIS — Z86.007 PERSONAL HISTORY OF IN-SITU NEOPLASM OF SKIN: ICD-10-CM

## 2022-09-01 DIAGNOSIS — L81.4 OTHER MELANIN HYPERPIGMENTATION: ICD-10-CM

## 2022-09-01 DIAGNOSIS — L82.1 OTHER SEBORRHEIC KERATOSIS: ICD-10-CM

## 2022-09-01 DIAGNOSIS — D22 MELANOCYTIC NEVI: ICD-10-CM

## 2022-09-01 DIAGNOSIS — Z85.820 PERSONAL HISTORY OF MALIGNANT MELANOMA OF SKIN: ICD-10-CM

## 2022-09-01 DIAGNOSIS — D18.0 HEMANGIOMA: ICD-10-CM

## 2022-09-01 PROBLEM — D22.5 MELANOCYTIC NEVI OF TRUNK: Status: ACTIVE | Noted: 2022-09-01

## 2022-09-01 PROBLEM — D18.01 HEMANGIOMA OF SKIN AND SUBCUTANEOUS TISSUE: Status: ACTIVE | Noted: 2022-09-01

## 2022-09-01 PROCEDURE — ? COUNSELING

## 2022-09-01 PROCEDURE — ? OTC TREATMENT REGIMEN

## 2022-09-01 PROCEDURE — 99213 OFFICE O/P EST LOW 20 MIN: CPT

## 2022-09-01 PROCEDURE — ? OTHER

## 2022-09-01 ASSESSMENT — LOCATION DETAILED DESCRIPTION DERM
LOCATION DETAILED: RIGHT INFERIOR LATERAL MIDBACK
LOCATION DETAILED: PERIUMBILICAL SKIN
LOCATION DETAILED: RIGHT DORSAL WRIST
LOCATION DETAILED: EPIGASTRIC SKIN

## 2022-09-01 ASSESSMENT — LOCATION SIMPLE DESCRIPTION DERM
LOCATION SIMPLE: ABDOMEN
LOCATION SIMPLE: RIGHT LOWER BACK
LOCATION SIMPLE: RIGHT WRIST

## 2022-09-01 ASSESSMENT — LOCATION ZONE DERM
LOCATION ZONE: ARM
LOCATION ZONE: TRUNK

## 2022-11-30 ENCOUNTER — OFFICE VISIT (OUTPATIENT)
Dept: URGENT CARE | Facility: CLINIC | Age: 62
End: 2022-11-30

## 2022-11-30 VITALS — RESPIRATION RATE: 16 BRPM | OXYGEN SATURATION: 98 % | HEART RATE: 105 BPM | TEMPERATURE: 97.5 F

## 2022-11-30 DIAGNOSIS — Z51.89 VISIT FOR WOUND CHECK: Primary | ICD-10-CM

## 2022-11-30 NOTE — PROGRESS NOTES
[facility]        NAME: Lary Rodríguez is a 65 y o  male  : 1960    MRN: [id number]  DATE: 2022  TIME: 6:39 PM    Pulse 105   Temp 97 5 °F (36 4 °C)   Resp 16   SpO2 98%     Assessment and Plan   Visit for wound check [Z51 89]  1  Visit for wound check              Patient Instructions       Follow up with PCP in 3-5 days  Proceed to  ER if symptoms worsen  Chief Complaint     Chief Complaint   Patient presents with   • Wound Check     Today is day 10 post laceration on L shin  Sutured and steri-stripped by another urgent  Pt does not have an established PCP; just moving here form [place]            History of Present Illness       Pt for suture removal  , left lower leg wound x 10 days ago, no complaints       Review of Systems   Review of Systems   Constitutional: Negative  HENT: Negative  Eyes: Negative  Respiratory: Negative  Cardiovascular: Negative  Gastrointestinal: Negative  Endocrine: Negative  Genitourinary: Negative  Musculoskeletal: Negative  Skin: Negative  Allergic/Immunologic: Negative  Neurological: Negative  Hematological: Negative  Psychiatric/Behavioral: Negative  All other systems reviewed and are negative  Current Medications     No current outpatient medications on file  Current Allergies     Allergies as of 2022 - Reviewed 2022   Allergen Reaction Noted   • Codeine Nausea Only 2022            The following portions of the patient's history were reviewed and updated as appropriate: allergies, current medications, past family history, past medical history, past social history, past surgical history and problem list      Past Medical History:   Diagnosis Date   • Depression    • GERD (gastroesophageal reflux disease)    • Intellectual disability    • Scoliosis, unspecified scoliosis type, unspecified spinal region        History reviewed  No pertinent surgical history  History reviewed   No pertinent family history  Medications have been verified  Objective   Pulse 105   Temp 97 5 °F (36 4 °C)   Resp 16   SpO2 98%        Physical Exam     Physical Exam  Vitals and nursing note reviewed  Constitutional:       Appearance: Normal appearance  He is normal weight  Pulmonary:      Effort: Pulmonary effort is normal       Breath sounds: Normal breath sounds  Musculoskeletal:         General: Normal range of motion  Comments: Left lower leg laceration,  Healing well  Every other suture and every other steri strip removed,   No erythema no swelling no tenderness    Neurological:      Mental Status: He is alert

## 2022-12-05 ENCOUNTER — OFFICE VISIT (OUTPATIENT)
Dept: URGENT CARE | Facility: CLINIC | Age: 62
End: 2022-12-05

## 2022-12-05 VITALS — TEMPERATURE: 98.3 F | OXYGEN SATURATION: 97 % | HEART RATE: 91 BPM | RESPIRATION RATE: 18 BRPM

## 2022-12-05 DIAGNOSIS — Z48.02 ENCOUNTER FOR REMOVAL OF SUTURES: Primary | ICD-10-CM

## 2022-12-05 NOTE — PROGRESS NOTES
[facility]        NAME: Baljit Erwin is a 65 y o  male  : 1960    MRN: [id number]  DATE: 2022  TIME: 5:04 PM    Pulse 91   Temp 98 3 °F (36 8 °C) (Tympanic)   Resp 18   SpO2 97%     Assessment and Plan   Encounter for removal of sutures [Z48 02]  1  Encounter for removal of sutures  Ambulatory Referral to Wound Care            Patient Instructions       Follow up with PCP in 3-5 days  Proceed to  ER if symptoms worsen  Chief Complaint     Chief Complaint   Patient presents with   • Suture / Staple Removal     Left leg suture (shin)    Pt here to get sutures looked at and removed  History of Present Illness       Pt for suture removal       Review of Systems   Review of Systems   Constitutional: Negative  HENT: Negative  Eyes: Negative  Respiratory: Negative  Cardiovascular: Negative  Gastrointestinal: Negative  Endocrine: Negative  Genitourinary: Negative  Musculoskeletal: Negative  Skin: Negative  Allergic/Immunologic: Negative  Neurological: Negative  Hematological: Negative  Psychiatric/Behavioral: Negative  All other systems reviewed and are negative  Current Medications     No current outpatient medications on file  Current Allergies     Allergies as of 2022 - Reviewed 2022   Allergen Reaction Noted   • Codeine Nausea Only 2022            The following portions of the patient's history were reviewed and updated as appropriate: allergies, current medications, past family history, past medical history, past social history, past surgical history and problem list      Past Medical History:   Diagnosis Date   • Depression    • GERD (gastroesophageal reflux disease)    • Intellectual disability    • Scoliosis, unspecified scoliosis type, unspecified spinal region        History reviewed  No pertinent surgical history  History reviewed  No pertinent family history        Medications have been verified  Objective   Pulse 91   Temp 98 3 °F (36 8 °C) (Tympanic)   Resp 18   SpO2 97%        Physical Exam     Physical Exam  Vitals and nursing note reviewed  Constitutional:       Appearance: Normal appearance  He is normal weight  Comments: Will refer pt to wound care    Musculoskeletal:      Comments: Left lower leg wound  No swelling no erythema   Sutures and steri strips removed    Neurological:      Mental Status: He is alert

## 2022-12-12 ENCOUNTER — OFFICE VISIT (OUTPATIENT)
Dept: WOUND CARE | Facility: HOSPITAL | Age: 62
End: 2022-12-12

## 2022-12-12 VITALS — TEMPERATURE: 97.4 F

## 2022-12-12 DIAGNOSIS — S81.802A OPEN WOUND OF LEFT LOWER LEG, INITIAL ENCOUNTER: Primary | ICD-10-CM

## 2022-12-12 NOTE — PROGRESS NOTES
Patient ID: Rebekah Alaniz is a 65 y o  male Date of Birth 1960     Chief Complaint  Chief Complaint   Patient presents with   • No Wound Consult     Left leg healed wound       Allergies  Codeine    Assessment:    No problem-specific Assessment & Plan notes found for this encounter  Diagnoses and all orders for this visit:    Open wound of left lower leg, initial encounter  -     Wound cleansing and dressings; Future              Procedures    Plan:  Initial evaluation, no open wound today  Wound is healed  Lightly moisturize daily allow the scab to come off when it is ready  Okay to get wet in the shower but do not scrub  Wait about a week before submerging in any body of water such as bathtub   Follow-up here in the wound management center as needed or call with questions or concerns         Subjective:        Patient presents for initial evaluation of a left lower extremity wound that is post fall about 3 weeks ago at which time he went to urgent care and had sutures placed  Sutures were removed on 12/5  It is currently scabbed over they have been leaving the area open to air  The following portions of the patient's history were reviewed and updated as appropriate:   He  has a past medical history of Depression, GERD (gastroesophageal reflux disease), Intellectual disability, and Scoliosis, unspecified scoliosis type, unspecified spinal region  He There are no problems to display for this patient  He  reports that he has never smoked  He has never been exposed to tobacco smoke  He has never used smokeless tobacco  He reports that he does not currently use alcohol  He reports that he does not currently use drugs  No current outpatient medications on file  No current facility-administered medications for this visit  He is allergic to codeine       Review of Systems   Constitutional: Negative for chills and fever  HENT: Negative for congestion and sneezing      Respiratory: Negative for cough  Musculoskeletal: Positive for gait problem  Skin: Positive for wound  Psychiatric/Behavioral: Negative for agitation  Objective:            Temp (!) 97 4 °F (36 3 °C)     Physical Exam  Vitals reviewed  Constitutional:       General: He is not in acute distress  Appearance: Normal appearance  He is not ill-appearing, toxic-appearing or diaphoretic  HENT:      Head: Normocephalic and atraumatic  Right Ear: External ear normal       Left Ear: External ear normal    Eyes:      Conjunctiva/sclera: Conjunctivae normal    Pulmonary:      Effort: Pulmonary effort is normal  No respiratory distress  Musculoskeletal:      Cervical back: Neck supple  Left lower leg: No edema  Skin:     Comments: No open wound  Dry and intact eschar, no bogginess noted  Neurological:      Mental Status: He is alert  Psychiatric:         Mood and Affect: Mood normal          Behavior: Behavior normal            Wound Instructions:  Orders Placed This Encounter   Procedures   • Wound cleansing and dressings     Left leg wound is healed with dry scab  May shower and wash gently in shower with mild soap and water  Do not soak in a bath tub until scab falls off  If wound is open when scab falls off, cover with dry sterile dressing and call us at the [address] [facility],[address]  Apply lotion to area daily  Standing Status:   Future     Standing Expiration Date:   12/12/2023        Diagnosis ICD-10-CM Associated Orders   1   Open wound of left lower leg, initial encounter  [id number] Wound cleansing and dressings

## 2022-12-18 ENCOUNTER — HOSPITAL ENCOUNTER (EMERGENCY)
Facility: HOSPITAL | Age: 62
Discharge: HOME | End: 2022-12-18
Attending: EMERGENCY MEDICINE

## 2022-12-18 VITALS
BODY MASS INDEX: 17.67 KG/M2 | HEART RATE: 82 BPM | TEMPERATURE: 98.6 F | DIASTOLIC BLOOD PRESSURE: 73 MMHG | HEIGHT: 60 IN | WEIGHT: 90 LBS | OXYGEN SATURATION: 95 % | RESPIRATION RATE: 20 BRPM | SYSTOLIC BLOOD PRESSURE: 110 MMHG

## 2022-12-18 DIAGNOSIS — Z51.89 VISIT FOR WOUND CHECK: Primary | ICD-10-CM

## 2022-12-18 RX ORDER — SIMVASTATIN 40 MG
40 TABLET ORAL
COMMUNITY

## 2022-12-18 RX ORDER — SUCRALFATE 1 G/1
1 TABLET ORAL 2 TIMES DAILY
COMMUNITY

## 2022-12-18 RX ORDER — BUPROPION HYDROCHLORIDE 100 MG/1
200 TABLET ORAL 2 TIMES DAILY
COMMUNITY

## 2022-12-18 RX ORDER — OMEPRAZOLE 40 MG/1
40 CAPSULE, DELAYED RELEASE ORAL DAILY
COMMUNITY

## 2022-12-18 NOTE — ED PROVIDER NOTES
History  Chief Complaint   Patient presents with   • Leg Injury     Pt fell on a step and injured on November 20th  Pt received 8-9 stitches  Recently developed redness and pain at site  64 yo M with PMH of intellectual delay, scoliosis, depression, GERD presents to ED for wound check  Had sutures placed at urgent care about a month ago, had sutures removed by urgent care 12/5  Saw wound care 12/12 - had no open wound  Pt is with his sister, who is taking care of him while they find him a group home locally (moved from [place] recently)  Pt had pain in his leg earlier today, none now  No fevers/redness  No new injury  Walking normally  No calf pain or h/o pe/dvt  Pt feels fine now  History provided by:  Patient and medical records   used: No    Wound Check   He was treated in the ED more than 14 days ago  Previous treatment in the ED includes laceration repair  Treatments since wound repair include regular soap and water washings  There has been no drainage from the wound  There is no redness present  There is no swelling present  The pain has improved  He has no difficulty moving the affected extremity or digit  Prior to Admission Medications   Prescriptions Last Dose Informant Patient Reported? Taking? buPROPion (WELLBUTRIN) 100 mg tablet   Yes Yes   Sig: Take 200 mg by mouth 2 (two) times a day   omeprazole (PriLOSEC) 40 MG capsule   Yes Yes   Sig: Take 40 mg by mouth daily   simvastatin (ZOCOR) 40 mg tablet   Yes Yes   Sig: Take 40 mg by mouth daily at bedtime   sucralfate (CARAFATE) 1 g tablet   Yes Yes   Sig: Take 1 g by mouth 2 (two) times a day      Facility-Administered Medications: None       Past Medical History:   Diagnosis Date   • Depression    • GERD (gastroesophageal reflux disease)    • Intellectual disability    • Scoliosis, unspecified scoliosis type, unspecified spinal region        History reviewed  No pertinent surgical history  History reviewed   No pertinent family history  I have reviewed and agree with the history as documented  E-Cigarette/Vaping   • E-Cigarette Use Never User      E-Cigarette/Vaping Substances   • Nicotine No    • THC No    • CBD No    • Flavoring No    • Other No    • Unknown No      Social History     Tobacco Use   • Smoking status: Never     Passive exposure: Never   • Smokeless tobacco: Never   Vaping Use   • Vaping Use: Never used   Substance Use Topics   • Alcohol use: Not Currently   • Drug use: Not Currently       Review of Systems   Constitutional: Negative for chills, diaphoresis, fatigue, fever and unexpected weight change  HENT: Negative for congestion, ear pain, rhinorrhea, sore throat, trouble swallowing and voice change  Eyes: Negative for pain and visual disturbance  Respiratory: Negative for cough, chest tightness and shortness of breath  Cardiovascular: Negative for chest pain, palpitations and leg swelling  Gastrointestinal: Negative for abdominal pain, blood in stool, constipation, diarrhea, nausea and vomiting  Genitourinary: Negative for difficulty urinating and hematuria  Musculoskeletal: Negative for arthralgias, back pain and neck pain  Skin: Positive for wound  Negative for rash  Neurological: Negative for dizziness, syncope, light-headedness and headaches  Psychiatric/Behavioral: Negative for confusion and suicidal ideas  The patient is not nervous/anxious  Physical Exam  Physical Exam  Vitals and nursing note reviewed  Constitutional:       General: He is not in acute distress  Appearance: He is well-developed  He is not diaphoretic  HENT:      Head: Normocephalic and atraumatic  Right Ear: External ear normal       Left Ear: External ear normal       Nose: Nose normal    Eyes:      General: No scleral icterus  Right eye: No discharge  Left eye: No discharge        Conjunctiva/sclera: Conjunctivae normal       Pupils: Pupils are equal, round, and reactive to light  Neck:      Vascular: No JVD  Trachea: No tracheal deviation  Cardiovascular:      Rate and Rhythm: Normal rate and regular rhythm  Heart sounds: Normal heart sounds  No murmur heard  No friction rub  No gallop  Pulmonary:      Effort: Pulmonary effort is normal  No respiratory distress  Breath sounds: Normal breath sounds  No stridor  No wheezing or rales  Chest:      Chest wall: No tenderness  Abdominal:      General: Bowel sounds are normal  There is no distension  Palpations: Abdomen is soft  Tenderness: There is no abdominal tenderness  There is no guarding or rebound  Musculoskeletal:         General: No tenderness or deformity  Normal range of motion  Cervical back: Normal range of motion and neck supple  Lymphadenopathy:      Cervical: No cervical adenopathy  Skin:     General: Skin is warm and dry  Findings: No rash  Neurological:      Mental Status: He is alert and oriented to person, place, and time  Cranial Nerves: No cranial nerve deficit  Sensory: No sensory deficit        Coordination: Coordination normal    Psychiatric:         Behavior: Behavior normal          Vital Signs  ED Triage Vitals [12/18/22 0002]   Temperature Pulse Respirations Blood Pressure SpO2   98 6 °F (37 °C) 82 20 110/73 95 %      Temp Source Heart Rate Source Patient Position - Orthostatic VS BP Location FiO2 (%)   Temporal Monitor Sitting Left arm --      Pain Score       --           Vitals:    12/18/22 0002   BP: 110/73   Pulse: 82   Patient Position - Orthostatic VS: Sitting         Visual Acuity      ED Medications  Medications - No data to display    Diagnostic Studies  Results Reviewed     None                 No orders to display              Procedures  Procedures         ED Course                                             MDM  Number of Diagnoses or Management Options  Visit for wound check: new and does not require workup  Diagnosis management comments: Discussed wound healing  No risk factors for DVT, and no calf pain  Pt asymptomatic at this time, normal gait  Unclear what caused his pain earlier - but resolved now  Offered xray, as one was not done initially, however pt and sister declined as he is feeling better  They have outpt pcp f/u scheduled  If sx return - will seek treatment, for now they wish to not pursue workup, which is reasonable  Amount and/or Complexity of Data Reviewed  Obtain history from someone other than the patient: yes  Review and summarize past medical records: yes    Risk of Complications, Morbidity, and/or Mortality  Presenting problems: minimal  Diagnostic procedures: minimal  Management options: minimal    Patient Progress  Patient progress: improved      Disposition  Final diagnoses:   Visit for wound check     Time reflects when diagnosis was documented in both MDM as applicable and the Disposition within this note     Time User Action Codes Description Comment    12/18/2022 12:31 AM Milo Christensen [Z51 [id number]] Visit for wound check       ED Disposition     ED Disposition   Discharge    Condition   Stable    Date/Time   Sun Dec 18, 2022 12:31 AM    Comment   Carlos Eduardo Arreola discharge to home/self care                 Follow-up Information     Follow up With Specialties Details Why Contact Info Additional Information    Eduar Bentley DO Family Medicine Schedule an appointment as soon as possible for a visit   [address] [facility], [place], operated by [facility] [address] [facility] [address]        [address] Emergency Department Emergency Medicine  If symptoms worsen [address] [place],[address] [id number]  [address] [facility] Emergency Department, [address] [place] [address], [address] [place] [address] [place] [place] [address]          Discharge Medication List as of 12/18/2022 12:31 AM      CONTINUE these medications which have NOT CHANGED    Details buPROPion (WELLBUTRIN) 100 mg tablet Take 200 mg by mouth 2 (two) times a day, Historical Med      omeprazole (PriLOSEC) 40 MG capsule Take 40 mg by mouth daily, Historical Med      simvastatin (ZOCOR) 40 mg tablet Take 40 mg by mouth daily at bedtime, Historical Med      sucralfate (CARAFATE) 1 g tablet Take 1 g by mouth 2 (two) times a day, Historical Med             No discharge procedures on file      PDMP Review     None          ED Provider  Electronically Signed by           Telma Laura MD  12/18/22 7849

## 2022-12-28 ENCOUNTER — OFFICE VISIT (OUTPATIENT)
Dept: URGENT CARE | Facility: CLINIC | Age: 62
End: 2022-12-28

## 2022-12-28 ENCOUNTER — APPOINTMENT (OUTPATIENT)
Dept: RADIOLOGY | Facility: CLINIC | Age: 62
End: 2022-12-28

## 2022-12-28 ENCOUNTER — TELEPHONE (OUTPATIENT)
Dept: OBGYN CLINIC | Facility: HOSPITAL | Age: 62
End: 2022-12-28

## 2022-12-28 VITALS — TEMPERATURE: 95.7 F | OXYGEN SATURATION: 99 % | RESPIRATION RATE: 16 BRPM | HEART RATE: 56 BPM

## 2022-12-28 DIAGNOSIS — S69.92XA INJURY OF LEFT INDEX FINGER, INITIAL ENCOUNTER: Primary | ICD-10-CM

## 2022-12-28 DIAGNOSIS — S69.92XA FINGER INJURY, LEFT, INITIAL ENCOUNTER: ICD-10-CM

## 2022-12-28 NOTE — PROGRESS NOTES
[facility]        NAME: Javier Damico is a 65 y o  male  : 1960    MRN: [id number]  DATE: 2022  TIME: 6:15 PM      Assessment and Plan     Injury of left index finger, initial encounter [S69 92XA]  1  Injury of left index finger, initial encounter  XR finger left second digit-index    Ambulatory Referral to Hand Surgery        Wet read left index finger xray shows no acute abnormality  Will monitor for final read  Patient Instructions     Use finger splint as directed  If the final x-ray shows no fracture you may use a splint as needed  If the final x-ray shows a fracture keep the splint on until follow-up with hand surgery  Acetaminophen for pain  Ice as needed  Follow-up with hand surgery  PCP follow-up in 3 to 5 days  Proceed to the ER if symptoms worsen    Chief Complaint     Chief Complaint   Patient presents with   • Finger Injury     Pt fell last night injuring L index finger  Swelling and bruising noted  Pain 8/10           History of Present Illness     Patient is a 52-year-old male who presents with brother-in-law at bedside  Patient had a mechanical fall last night  States he fell forward injuring his left index finger against a chair  Reports pain and swelling to the area  Denies hitting his head  Denies blood thinners  Review of Systems     Review of Systems   Constitutional: Negative for chills and fever  Gastrointestinal: Negative for diarrhea, nausea and vomiting  Musculoskeletal: Positive for arthralgias and joint swelling  Skin: Negative for wound  All other systems reviewed and are negative          Current Medications       Current Outpatient Medications:   •  metoprolol tartrate (LOPRESSOR) 25 mg tablet, Take 25 mg by mouth every 12 (twelve) hours, Disp: , Rfl:   •  buPROPion (WELLBUTRIN) 100 mg tablet, Take 200 mg by mouth 2 (two) times a day, Disp: , Rfl:   •  omeprazole (PriLOSEC) 40 MG capsule, Take 40 mg by mouth daily, Disp: , Rfl:   • simvastatin (ZOCOR) 40 mg tablet, Take 40 mg by mouth daily at bedtime, Disp: , Rfl:   •  sucralfate (CARAFATE) 1 g tablet, Take 1 g by mouth 2 (two) times a day, Disp: , Rfl:     Current Allergies     Allergies as of 12/28/2022 - Reviewed 12/28/2022   Allergen Reaction Noted   • Codeine Nausea Only 11/30/2022              The following portions of the patient's history were reviewed and updated as appropriate: allergies, current medications, past family history, past medical history, past social history, past surgical history and problem list      Past Medical History:   Diagnosis Date   • Depression    • GERD (gastroesophageal reflux disease)    • Intellectual disability    • Scoliosis, unspecified scoliosis type, unspecified spinal region        History reviewed  No pertinent surgical history  History reviewed  No pertinent family history  Medications have been verified  Objective     Pulse 56   Temp (!) 95 7 °F (35 4 °C)   Resp 16   SpO2 99%   No LMP for male patient  Physical Exam     Physical Exam  Vitals and nursing note reviewed  Constitutional:       General: He is not in acute distress  Appearance: Normal appearance  He is not ill-appearing, toxic-appearing or diaphoretic  Cardiovascular:      Rate and Rhythm: Normal rate  Pulses: Normal pulses  Heart sounds: Normal heart sounds, S1 normal and S2 normal    Pulmonary:      Effort: Pulmonary effort is normal       Breath sounds: Normal breath sounds and air entry  No stridor, decreased air movement or transmitted upper airway sounds  No decreased breath sounds, wheezing, rhonchi or rales  Musculoskeletal:      Left hand: Swelling, tenderness and bony tenderness present  No deformity or lacerations  Decreased range of motion  Normal pulse  Hands:    Skin:     General: Skin is warm  Capillary Refill: Capillary refill takes less than 2 seconds  Neurological:      General: No focal deficit present  Mental Status: He is alert  Psychiatric:         Mood and Affect: Mood normal          Behavior: Behavior normal          Thought Content: Thought content normal          Judgment: Judgment normal      Orthopedic injury treatment    Date/Time: 12/28/2022 6:14 PM  Performed by: BRIANNE Barakat  Authorized by: BRIANNE Barakat     Patient Location:  [facility] Protocol:  Consent: Verbal consent obtained  Consent given by: patient  Time out: Immediately prior to procedure a "time out" was called to verify the correct patient, procedure, equipment, support staff and site/side marked as required  Patient understanding: patient states understanding of the procedure being performed  Patient identity confirmed: verbally with patient      Injury location:  Finger  Location details:  Left index finger  Injury type:   Soft tissue (wet read)  Neurovascular status: Neurovascularly intact    Distal perfusion: normal    Neurological function: normal    Range of motion: reduced    Immobilization:  Splint  Splint type:  Finger splint, static  Neurovascular status: Neurovascularly intact    Distal perfusion: normal    Neurological function: normal    Range of motion: unchanged    Patient tolerance:  Patient tolerated the procedure well with no immediate complications

## 2022-12-28 NOTE — TELEPHONE ENCOUNTER
Patient is being referred to a orthopedics  Please schedule accordingly      [address]   [phone]

## 2022-12-28 NOTE — PATIENT INSTRUCTIONS
Use finger splint as directed  If the final x-ray shows no fracture you may use a splint as needed  If the final x-ray shows a fracture keep the splint on until follow-up with hand surgery    Acetaminophen for pain  Ice as needed  Follow-up with hand surgery  PCP follow-up in 3 to 5 days  Proceed to the ER if symptoms worsen

## 2023-02-03 ENCOUNTER — OFFICE VISIT (OUTPATIENT)
Dept: FAMILY MEDICINE CLINIC | Facility: CLINIC | Age: 63
End: 2023-02-03

## 2023-02-03 VITALS
SYSTOLIC BLOOD PRESSURE: 120 MMHG | HEART RATE: 73 BPM | OXYGEN SATURATION: 98 % | DIASTOLIC BLOOD PRESSURE: 78 MMHG | RESPIRATION RATE: 15 BRPM | WEIGHT: 104.1 LBS | HEIGHT: 60 IN | BODY MASS INDEX: 20.44 KG/M2

## 2023-02-03 DIAGNOSIS — E78.2 MIXED HYPERLIPIDEMIA: ICD-10-CM

## 2023-02-03 DIAGNOSIS — C7A.8 NEUROENDOCRINE CARCINOMA (HCC): ICD-10-CM

## 2023-02-03 DIAGNOSIS — D3A.8 NEUROENDOCRINE TUMOR OF PANCREAS: ICD-10-CM

## 2023-02-03 DIAGNOSIS — F79 INTELLECTUAL DISABILITY: ICD-10-CM

## 2023-02-03 DIAGNOSIS — F80.9 SPEECH DISORDER DEVELOPMENTAL: ICD-10-CM

## 2023-02-03 DIAGNOSIS — M41.9 KYPHOSCOLIOSIS AND SCOLIOSIS: ICD-10-CM

## 2023-02-03 DIAGNOSIS — L21.9 SEBORRHEIC DERMATITIS: ICD-10-CM

## 2023-02-03 DIAGNOSIS — R00.0 TACHYCARDIA: ICD-10-CM

## 2023-02-03 DIAGNOSIS — F33.42 RECURRENT MAJOR DEPRESSIVE DISORDER, IN FULL REMISSION (HCC): ICD-10-CM

## 2023-02-03 DIAGNOSIS — Z12.11 ENCOUNTER FOR SCREENING COLONOSCOPY: ICD-10-CM

## 2023-02-03 DIAGNOSIS — Z79.899 ENCOUNTER FOR LONG-TERM (CURRENT) USE OF MEDICATIONS: ICD-10-CM

## 2023-02-03 DIAGNOSIS — J30.1 SEASONAL ALLERGIC RHINITIS DUE TO POLLEN: ICD-10-CM

## 2023-02-03 DIAGNOSIS — N40.0 BENIGN PROSTATIC HYPERPLASIA, UNSPECIFIED WHETHER LOWER URINARY TRACT SYMPTOMS PRESENT: ICD-10-CM

## 2023-02-03 DIAGNOSIS — Z12.5 PROSTATE CANCER SCREENING: ICD-10-CM

## 2023-02-03 DIAGNOSIS — E55.9 VITAMIN D DEFICIENCY: ICD-10-CM

## 2023-02-03 DIAGNOSIS — K21.9 GASTROESOPHAGEAL REFLUX DISEASE, UNSPECIFIED WHETHER ESOPHAGITIS PRESENT: ICD-10-CM

## 2023-02-03 DIAGNOSIS — Q93.89 JACOBSEN SYNDROME: Primary | ICD-10-CM

## 2023-02-03 PROBLEM — R32 URINARY INCONTINENCE: Status: ACTIVE | Noted: 2018-04-11

## 2023-02-03 PROBLEM — H90.5 SENSORINEURAL HEARING LOSS: Status: ACTIVE | Noted: 2019-05-08

## 2023-02-03 PROBLEM — R26.9 ABNORMAL GAIT: Status: ACTIVE | Noted: 2021-11-30

## 2023-02-03 PROBLEM — R41.3 MEMORY LOSS: Status: ACTIVE | Noted: 2022-04-04

## 2023-02-03 PROBLEM — F33.9 RECURRENT MAJOR DEPRESSIVE DISORDER (HCC): Status: ACTIVE | Noted: 2022-10-04

## 2023-02-03 PROBLEM — E83.52 HYPERCALCEMIA: Status: ACTIVE | Noted: 2019-11-16

## 2023-02-03 PROBLEM — E83.52 HYPERCALCEMIA: Status: RESOLVED | Noted: 2019-11-16 | Resolved: 2023-02-03

## 2023-02-03 RX ORDER — LORATADINE 10 MG/1
10 TABLET ORAL DAILY
COMMUNITY
End: 2023-02-03 | Stop reason: SDUPTHER

## 2023-02-03 RX ORDER — UREA 10 %
1 LOTION (ML) TOPICAL DAILY
COMMUNITY
End: 2023-02-03 | Stop reason: ALTCHOICE

## 2023-02-03 RX ORDER — DOXYCYCLINE 100 MG/1
100 TABLET ORAL 2 TIMES DAILY
Qty: 60 TABLET | Refills: 1 | Status: SHIPPED | OUTPATIENT
Start: 2023-02-03 | End: 2023-04-04

## 2023-02-03 RX ORDER — OLOPATADINE HYDROCHLORIDE 1 MG/ML
1 SOLUTION/ DROPS OPHTHALMIC 2 TIMES DAILY PRN
Start: 2023-02-03

## 2023-02-03 RX ORDER — SUCRALFATE 1 G/1
1 TABLET ORAL 2 TIMES DAILY
Qty: 180 TABLET | Refills: 3 | Status: SHIPPED | OUTPATIENT
Start: 2023-02-03

## 2023-02-03 RX ORDER — METOPROLOL SUCCINATE 25 MG/1
25 TABLET, EXTENDED RELEASE ORAL DAILY
Start: 2023-02-03

## 2023-02-03 RX ORDER — IPRATROPIUM BROMIDE 42 UG/1
2 SPRAY, METERED NASAL 4 TIMES DAILY PRN
COMMUNITY
Start: 2022-10-05 | End: 2023-02-03 | Stop reason: ALTCHOICE

## 2023-02-03 RX ORDER — OLOPATADINE HYDROCHLORIDE 1 MG/ML
SOLUTION/ DROPS OPHTHALMIC
COMMUNITY
End: 2023-02-03 | Stop reason: SDUPTHER

## 2023-02-03 RX ORDER — OXYBUTYNIN CHLORIDE 10 MG/1
10 TABLET, EXTENDED RELEASE ORAL DAILY
COMMUNITY
Start: 2023-01-31

## 2023-02-03 RX ORDER — OMEPRAZOLE 40 MG/1
40 CAPSULE, DELAYED RELEASE ORAL DAILY
Status: SHIPPED
Start: 2023-02-03

## 2023-02-03 RX ORDER — LORATADINE 10 MG/1
10 TABLET ORAL DAILY PRN
Start: 2023-02-03

## 2023-02-03 RX ORDER — ACETAMINOPHEN 325 MG/1
650 TABLET ORAL EVERY 4 HOURS PRN
Start: 2023-02-03

## 2023-02-03 RX ORDER — KETOCONAZOLE 20 MG/ML
1 SHAMPOO TOPICAL DAILY
Qty: 120 ML | Refills: 5 | Status: SHIPPED | OUTPATIENT
Start: 2023-02-03

## 2023-02-03 RX ORDER — ACETAMINOPHEN 325 MG/1
650 TABLET ORAL EVERY 4 HOURS PRN
COMMUNITY
End: 2023-02-03 | Stop reason: SDUPTHER

## 2023-02-03 NOTE — PROGRESS NOTES
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  Which includes his kyphoscoliosis and his speech disorder and his intellectual disability and his GI disorder    Kyphoscoliosis  Patient to always have support when he sits or lies so that no part of his back or hips is left into the air but always has something supporting it to fight against gravity  If this does not help his leg pain we will go from there    Speech disorder  Patient is very slow and deliberate and has limited fund of words  To speech therapy since he has never been evaluated as far as his sister knows regarding speech  Apparently also has a swallowing disorder which is why he is on a PPI inhibitor as well as sucralfate  If patient needs further testing for swallowing dysfunction we will order studies    BPH  According to notes patient had a laser therapy but nothing more is really said    Seborrheic dermatitis  Is on ketoconazole shampoo which is doing a good job taking care of his scalp  We will use doxycycline twice daily to help his facial lesions for the next 2 months and see if this helps it to go away and keeps it in remission    Hyperlipidemia  On simvastatin  Last cholesterol and LDL as well as triglycerides were well under control  Check it before next visit    Tachycardia  We assume this is why patient is on a beta-blocker as he has no other recent    Recurrent major depressive disorder in full remission  When patient had to move because both his parents had passed he was very depressed since he spent his entire life with them  Patient seems well-adjusted at this point on bupropion therefore we will continue the same    Neuroendocrine tumor of the pancreas  Apparently this was resected at least 5 years ago and no mention of it or follow-up was ever made again      Allergic rhinitis  When appropriate patient takes Claritin and Patanol    Patient and sister need to return to the office because at the end of an hour appointment she brought out of the paper that needs to be filled for group home  There are parts in this that have to be measured are done by the patient such as hearing test and visual test so she will make another visit    Otherwise to recheck in 6 months with fasting blood work prior  [employer] as needed otherwise    I have spent 70 minutes with Patient and family today in which greater than 50% of this time was spent in counseling/coordination of care regarding Diagnostic results, Prognosis, Risks and benefits of tx options, Intructions for management, Patient and family education, Risk factor reductions and Impressions  Health Maintenance   Topic Date Due   • Medicare Annual Wellness Visit (AWV)  Never done   • Colorectal Cancer Screening  Never done   • COVID-19 Vaccine (2 - Pfizer series) 02/16/2022   • Influenza Vaccine (1) 09/01/2022   • HIV Screening  02/03/2025 (Originally 11/10/1975)   • Hepatitis C Screening  03/03/2025 (Originally 1960)   • Depression Remission PHQ  08/03/2023   • BMI: Adult  02/03/2024   • Pneumococcal Vaccine: Pediatrics (0 to 5 Years) and At-Risk Patients (6 to 59 Years)  Aged Out   • HIB Vaccine  Aged Out   • IPV Vaccine  Aged Out   • Hepatitis A Vaccine  Aged Out   • Meningococcal ACWY Vaccine  Aged Out   • HPV Vaccine  Aged Out         Problem List as of 2/3/2023 Reviewed: 2/3/2023 12:05 PM by Cruz Crespo, DO    Abnormal gait    Allergic rhinitis    Anxiety    BPH (benign prostatic hyperplasia)    GERD (gastroesophageal reflux disease)    Intellectual disability    Georjean Jaquelin Syndrome    Mixed hyperlipidemia    Neuroendocrine carcinoma ([facility] [address] )    Neuroendocrine tumor of pancreas    Other psoriasis    Recurrent major depressive disorder ([facility] [address] )    Sensorineural hearing loss    Urinary incontinence         Subjective:   Chief Complaint   Patient presents with   • Establish Care   • Physical Exam     Patient is here with his sister as a new patient    He is born with [place] syndrome that was discovered through genetic testing was 26years of age  He does have the short stature the scoliosis is the results of it  There are some swallowing issues and intellectual challenges  Is all because of distortions and problems with chromosome 11    He has moved here from the [place] area to be with his sister  He was taking care of by his parents for many many years  1st diagnosed life expectancy was 10 years  With good care and love he is 65 years strong    Patient is concerned in his left groin that he has another hernia because he feels discomfort  Patient has a hard time according to sister with hearing about a problem and then assuming he has it because of the attention  patient ID: Candie Dyer is a 65 y o  male  Patient's past medical history, surgical history, family history, social history, and Tobacco history reviewed with patient  MED LIST WAS REVIEWED AND UPDATED    ROS  As per HPI  Rest of 12 point review of systems negative     Objective:      VITALS:  Wt Readings from Last 3 Encounters:   02/03/23 47 2 kg (104 lb 1 6 oz)   12/18/22 40 8 kg (90 lb)     BP Readings from Last 3 Encounters:   02/03/23 120/78   12/18/22 110/73     Pulse Readings from Last 3 Encounters:   02/03/23 73   12/28/22 56   12/18/22 82     Body mass index is 24 42 kg/m²  Laboratory Results: All pertinent labs and studies were reviewed with patient during this office visit with highlights of the results contained in this note in the ASSESSMENT AND PLAN section       Physical Exam    Gen    Man with some short stature and some speech difficulties but understand who  can answer very well no acute distress    Mental status  Patient is clean pleasant and cooperative in no acute distress    HEENT  His head is held to left side because of his severe scoliosis only  PERRLA 3 mm, EOMI without nystagmus, normocephalic atraumatic without facial weakness    Neck   supple no masses trachea midline positive click normal carotid upstrokes with no bruits    Cor  Regular rhythm without ectopy or murmur, no S3-S4, normal palpation that is no heave lift or thrill    Vascular  No edema    Lungs  CTA bilaterally in no respiratory distress no wheezes rhonchi or rales, normal to palpation no tactile fremitus    Abdomen  Soft, no palpable masses, no hepatosplenomegaly, normal bowel sounds, nontender, no evidence of hernia on the left just a hernia scar    Lymphatics  No palpable nodes in the neck, supraclavicular area, axilla, or groin    Musculoskeletal  Very severe kyphoscoliosis with thoracic area concave left with a resultant lumbar scoliosis concave right    Skin  Some seborrheic dermatitis changes near his nasolabial folds is At his hairline is clear    Neuro  ataxic gait, cranial nerves II to XII grossly intact

## 2023-02-03 NOTE — PATIENT INSTRUCTIONS
Make an appointment for speech therapy    Get the blood work sometime after the middle of April or anytime after including one or 2 weeks before the next appointment    See you back in 6 months or sooner if needed    Try the pillow so that every point of his body is touching something so it has support

## 2023-02-09 ENCOUNTER — OFFICE VISIT (OUTPATIENT)
Dept: FAMILY MEDICINE CLINIC | Facility: CLINIC | Age: 63
End: 2023-02-09

## 2023-02-09 VITALS
DIASTOLIC BLOOD PRESSURE: 63 MMHG | HEART RATE: 72 BPM | OXYGEN SATURATION: 96 % | RESPIRATION RATE: 18 BRPM | WEIGHT: 103.8 LBS | SYSTOLIC BLOOD PRESSURE: 143 MMHG | BODY MASS INDEX: 24.35 KG/M2

## 2023-02-09 DIAGNOSIS — Z11.1 SCREENING-PULMONARY TB: Primary | ICD-10-CM

## 2023-02-09 DIAGNOSIS — Z91.89 ENCOUNTER FOR HEPATITIS C VIRUS SCREENING TEST FOR HIGH RISK PATIENT: ICD-10-CM

## 2023-02-09 DIAGNOSIS — Q93.89 JACOBSEN SYNDROME: ICD-10-CM

## 2023-02-09 DIAGNOSIS — Z11.59 NEED FOR HEPATITIS C SCREENING TEST: ICD-10-CM

## 2023-02-09 DIAGNOSIS — Z11.59 ENCOUNTER FOR HEPATITIS C VIRUS SCREENING TEST FOR HIGH RISK PATIENT: ICD-10-CM

## 2023-02-09 NOTE — PROGRESS NOTES
Assessment/Plan:    Serge Longest syndrome  When we receive blood work regarding TB testing and hepatitis B&C screening we will call patient's sister to pick this up and add to the paperwork  Any other immunization screening or records from his childhood can be copied and taken with the form as well           Subjective:   Gutierrez Mcclain is a 65 y o male  Chief Complaint   Patient presents with   • fill out papers      Patient was asked to come to fill out a form for his living situation because it was quite involved  Past medical history, social history, and family history reviewed as appropriate for the complaint of this patient  MEDICATIONS REVIEWED AND UPDATED    10 point review of systems performed, the remainder of the ROS is negative except for what is noted in the history of chief complaint    Objective:    Vitals:    02/09/23 1603   BP: 143/63   Pulse: 72   Resp: 18   SpO2: 96%     Body mass index is 24 35 kg/m²      Physical Exam    General  Patient in no acute distress, well appearing, well nourished and appears stated age    Mental status  Is pleasant and cooperative with intellectual disability  Does require constant supervision

## 2023-02-15 ENCOUNTER — LAB (OUTPATIENT)
Dept: LAB | Facility: CLINIC | Age: 63
End: 2023-02-15

## 2023-02-15 DIAGNOSIS — Z91.89 ENCOUNTER FOR HEPATITIS C VIRUS SCREENING TEST FOR HIGH RISK PATIENT: ICD-10-CM

## 2023-02-15 DIAGNOSIS — Z11.59 ENCOUNTER FOR HEPATITIS C VIRUS SCREENING TEST FOR HIGH RISK PATIENT: ICD-10-CM

## 2023-02-15 DIAGNOSIS — Z11.59 NEED FOR HEPATITIS C SCREENING TEST: ICD-10-CM

## 2023-02-15 DIAGNOSIS — Z11.1 SCREENING-PULMONARY TB: ICD-10-CM

## 2023-02-15 LAB
HBV CORE AB SER QL: NORMAL
HBV SURFACE AB SER-ACNC: <3.1 MIU/ML
HCV AB SER QL: NORMAL

## 2023-02-17 LAB
GAMMA INTERFERON BACKGROUND BLD IA-ACNC: 0.04 IU/ML
M TB IFN-G BLD-IMP: NEGATIVE
M TB IFN-G CD4+ BCKGRND COR BLD-ACNC: 0 IU/ML
M TB IFN-G CD4+ BCKGRND COR BLD-ACNC: 0.01 IU/ML
MITOGEN IGNF BCKGRD COR BLD-ACNC: 3.04 IU/ML

## 2023-02-20 ENCOUNTER — TELEPHONE (OUTPATIENT)
Dept: FAMILY MEDICINE CLINIC | Facility: CLINIC | Age: 63
End: 2023-02-20

## 2023-02-20 NOTE — RESULT ENCOUNTER NOTE
Please call patient's sister so that she could  a copy of all these labs and put them with the form she has for his living at a [facility]

## 2023-02-20 NOTE — TELEPHONE ENCOUNTER
----- Message from Bernardo Santos DO sent at 2/20/2023  9:18 AM EST -----  Please call patient's sister so that she could  a copy of all these labs and put them with the form she has for his living at a [facility]  left detailed message to patient

## 2023-03-01 DIAGNOSIS — R00.0 TACHYCARDIA: ICD-10-CM

## 2023-03-01 DIAGNOSIS — E78.2 MIXED HYPERLIPIDEMIA: ICD-10-CM

## 2023-03-01 DIAGNOSIS — F33.42 RECURRENT MAJOR DEPRESSIVE DISORDER, IN FULL REMISSION (HCC): Primary | ICD-10-CM

## 2023-03-01 DIAGNOSIS — K21.9 GASTROESOPHAGEAL REFLUX DISEASE, UNSPECIFIED WHETHER ESOPHAGITIS PRESENT: ICD-10-CM

## 2023-03-02 RX ORDER — SIMVASTATIN 40 MG
40 TABLET ORAL
Qty: 90 TABLET | Refills: 0 | Status: SHIPPED | OUTPATIENT
Start: 2023-03-02

## 2023-03-02 RX ORDER — METOPROLOL SUCCINATE 25 MG/1
25 TABLET, EXTENDED RELEASE ORAL DAILY
Qty: 90 TABLET | Refills: 0 | Status: SHIPPED | OUTPATIENT
Start: 2023-03-02

## 2023-03-02 RX ORDER — BUPROPION HYDROCHLORIDE 100 MG/1
200 TABLET ORAL 2 TIMES DAILY
Qty: 360 TABLET | Refills: 0 | Status: SHIPPED | OUTPATIENT
Start: 2023-03-02

## 2023-03-02 RX ORDER — OMEPRAZOLE 40 MG/1
40 CAPSULE, DELAYED RELEASE ORAL DAILY
Qty: 90 CAPSULE | Refills: 0 | Status: SHIPPED | OUTPATIENT
Start: 2023-03-02

## 2023-05-16 ENCOUNTER — OFFICE VISIT (OUTPATIENT)
Dept: FAMILY MEDICINE CLINIC | Facility: CLINIC | Age: 63
End: 2023-05-16

## 2023-05-16 VITALS
RESPIRATION RATE: 16 BRPM | SYSTOLIC BLOOD PRESSURE: 110 MMHG | HEART RATE: 84 BPM | OXYGEN SATURATION: 96 % | BODY MASS INDEX: 19.99 KG/M2 | HEIGHT: 60 IN | DIASTOLIC BLOOD PRESSURE: 80 MMHG | WEIGHT: 101.8 LBS

## 2023-05-16 DIAGNOSIS — Z12.11 SCREENING FOR COLON CANCER: Primary | ICD-10-CM

## 2023-05-16 NOTE — PROGRESS NOTES
Assessment/Plan:    Constipation  Part could be functional since he has had some bad news in the recent past and was not quite himself  We will start above and below trying to evacuate this  Start with fleets enemas and soap water enemas as well as Gatorade and MiraLAX from above his sister will take him home and try to do this for him understanding there might be some manual disimpaction that has to happen  Any problems they will call but hopefully once the plug is gone he will be able to go and then we will do a cap of MiraLAX every evening with water or Gatorade               Subjective:   Sil Duran is a 65 y o male  Chief Complaint   Patient presents with   • Vomiting     Started Thursday, off and on  Have been drinking water  BM are liquid      Patient is here today with his sister because of bowel distress  On Thursday he had vomited and then he was okay Friday morning  Friday he ate went to the day program did not have a fever and had some accidents however with his bowel movements  He came home and slept a lot  Over the weekend he ate some of his favorite foods but not the usual amounts  He did sleep a lot during the weekend  Today is Tuesday and yesterday Monday he did go to the day program but slept again    He does have a history of partial possible colectomy  He does have a history of bowel obstruction as result of the scoliosis and his chromosomal disorder  Now his bowel movements are all liquid over the last 2 days      Past medical history, social history, and family history reviewed as appropriate for the complaint of this patient  MEDICATIONS REVIEWED AND UPDATED    10 point review of systems performed, the remainder of the ROS is negative except for what is noted in the history of chief complaint    Objective:    Vitals:    05/16/23 1107   BP: 110/80   Pulse: 84   Resp: 16   SpO2: 96%     Body mass index is 23 66 kg/m²      Physical Exam    Constitutional  Appears in no acute distress, quiet small for his age due to his chromosomal abnormality    Mental Status  Not his usual self, cooperative    Neck  No neck mass, No thyromegaly, Good carotid upstrokes bilaterally, trachea midline positive click    Respiratory  Breath sounds normal, No rales, No rhonchi, No wheezing, normal palpation    Cardiac  Regular rhythm without ectopy or murmur no S3-S4, no heave lift or thrill to palpation    Abdomen  Soft nontender no hepatosplenomegaly but full stool throughout  Very active bowel sounds    Vascular  No leg edema, No pedal edema    Muscular skeletal  No clubbing cyanosis , muscle tone normal    Skin  No appreciable rashes or abnormal appearing lesions

## 2023-05-16 NOTE — PATIENT INSTRUCTIONS
By the fleets 2 pack, and use each    Once one is inserted have him sit for as long as he can but wait for at least 5 to 10 minutes and then try to evacuate if nothing comes out after 10 minutes but the 2nd one and because it is absorbing    Then fill them with water with one or 2 drops of dish soap so that it can cause any irritation and help them come out    In the meantime get a bottle of MiraLAX and 64 ounces of Gatorade and for the whole bottle and and have him start drinking some of that from above

## 2023-05-24 DIAGNOSIS — K21.9 GASTROESOPHAGEAL REFLUX DISEASE, UNSPECIFIED WHETHER ESOPHAGITIS PRESENT: ICD-10-CM

## 2023-05-24 DIAGNOSIS — F33.42 RECURRENT MAJOR DEPRESSIVE DISORDER, IN FULL REMISSION (HCC): ICD-10-CM

## 2023-05-24 DIAGNOSIS — E78.2 MIXED HYPERLIPIDEMIA: ICD-10-CM

## 2023-05-24 DIAGNOSIS — R00.0 TACHYCARDIA: ICD-10-CM

## 2023-05-24 RX ORDER — BUPROPION HYDROCHLORIDE 100 MG/1
TABLET ORAL
Qty: 360 TABLET | Refills: 0 | Status: SHIPPED | OUTPATIENT
Start: 2023-05-24

## 2023-05-24 RX ORDER — OMEPRAZOLE 40 MG/1
40 CAPSULE, DELAYED RELEASE ORAL DAILY
Qty: 90 CAPSULE | Refills: 0 | Status: SHIPPED | OUTPATIENT
Start: 2023-05-24

## 2023-05-24 RX ORDER — METOPROLOL SUCCINATE 25 MG/1
25 TABLET, EXTENDED RELEASE ORAL DAILY
Qty: 90 TABLET | Refills: 0 | Status: SHIPPED | OUTPATIENT
Start: 2023-05-24

## 2023-05-25 RX ORDER — SIMVASTATIN 40 MG
TABLET ORAL
Qty: 90 TABLET | Refills: 0 | Status: SHIPPED | OUTPATIENT
Start: 2023-05-25

## 2023-06-23 ENCOUNTER — APPOINTMENT (INPATIENT)
Dept: RADIOLOGY | Facility: HOSPITAL | Age: 63
DRG: 871 | End: 2023-06-23
Payer: MEDICARE

## 2023-06-23 ENCOUNTER — APPOINTMENT (EMERGENCY)
Dept: RADIOLOGY | Facility: HOSPITAL | Age: 63
DRG: 871 | End: 2023-06-23
Payer: MEDICARE

## 2023-06-23 ENCOUNTER — HOSPITAL ENCOUNTER (INPATIENT)
Facility: HOSPITAL | Age: 63
LOS: 4 days | Discharge: HOME | DRG: 871 | End: 2023-06-27
Attending: EMERGENCY MEDICINE | Admitting: HOSPITALIST
Payer: MEDICARE

## 2023-06-23 DIAGNOSIS — F33.42 RECURRENT MAJOR DEPRESSIVE DISORDER, IN FULL REMISSION (HCC): ICD-10-CM

## 2023-06-23 DIAGNOSIS — J96.01 ACUTE RESPIRATORY FAILURE WITH HYPOXEMIA (HCC): ICD-10-CM

## 2023-06-23 DIAGNOSIS — J18.9 PNEUMONIA: Primary | ICD-10-CM

## 2023-06-23 DIAGNOSIS — R26.2 AMBULATORY DYSFUNCTION: ICD-10-CM

## 2023-06-23 DIAGNOSIS — F79 INTELLECTUAL DISABILITY: ICD-10-CM

## 2023-06-23 DIAGNOSIS — A41.9 SEPSIS (HCC): ICD-10-CM

## 2023-06-23 PROBLEM — J69.0 ASPIRATION PNEUMONIA (HCC): Status: ACTIVE | Noted: 2023-06-23

## 2023-06-23 PROBLEM — R06.89 ACUTE RESPIRATORY INSUFFICIENCY: Status: ACTIVE | Noted: 2023-06-23

## 2023-06-23 PROBLEM — R13.10 DYSPHAGIA: Status: ACTIVE | Noted: 2023-06-23

## 2023-06-23 PROBLEM — I10 PRIMARY HYPERTENSION: Status: ACTIVE | Noted: 2023-06-23

## 2023-06-23 PROBLEM — F32.A DEPRESSION: Status: ACTIVE | Noted: 2023-06-23

## 2023-06-23 LAB
ALBUMIN SERPL BCP-MCNC: 4.3 G/DL (ref 3.5–5)
ALP SERPL-CCNC: 115 U/L (ref 34–104)
ALT SERPL W P-5'-P-CCNC: 36 U/L (ref 7–52)
ANION GAP SERPL CALCULATED.3IONS-SCNC: 7 MMOL/L
ANION GAP SERPL CALCULATED.3IONS-SCNC: 8 MMOL/L
APTT PPP: 23 SECONDS (ref 23–37)
AST SERPL W P-5'-P-CCNC: 45 U/L (ref 13–39)
ATRIAL RATE: 97 BPM
BASOPHILS # BLD AUTO: 0.11 THOUSANDS/ÂΜL (ref 0–0.1)
BASOPHILS NFR BLD AUTO: 0 % (ref 0–1)
BILIRUB SERPL-MCNC: 0.88 MG/DL (ref 0.2–1)
BILIRUB UR QL STRIP: NEGATIVE
BUN SERPL-MCNC: 19 MG/DL (ref 5–25)
BUN SERPL-MCNC: 21 MG/DL (ref 5–25)
CALCIUM SERPL-MCNC: 8.8 MG/DL (ref 8.4–10.2)
CALCIUM SERPL-MCNC: 9.3 MG/DL (ref 8.4–10.2)
CARDIAC TROPONIN I PNL SERPL HS: 2 NG/L
CHLORIDE SERPL-SCNC: 104 MMOL/L (ref 96–108)
CHLORIDE SERPL-SCNC: 105 MMOL/L (ref 96–108)
CLARITY UR: CLEAR
CO2 SERPL-SCNC: 23 MMOL/L (ref 21–32)
CO2 SERPL-SCNC: 24 MMOL/L (ref 21–32)
COLOR UR: NORMAL
CREAT SERPL-MCNC: 0.9 MG/DL (ref 0.6–1.3)
CREAT SERPL-MCNC: 1.06 MG/DL (ref 0.6–1.3)
EOSINOPHIL # BLD AUTO: 0.05 THOUSAND/ÂΜL (ref 0–0.61)
EOSINOPHIL NFR BLD AUTO: 0 % (ref 0–6)
ERYTHROCYTE [DISTWIDTH] IN BLOOD BY AUTOMATED COUNT: 12.6 % (ref 11.6–15.1)
ERYTHROCYTE [DISTWIDTH] IN BLOOD BY AUTOMATED COUNT: 12.6 % (ref 11.6–15.1)
FLUAV RNA RESP QL NAA+PROBE: NEGATIVE
FLUBV RNA RESP QL NAA+PROBE: NEGATIVE
GFR SERPL CREATININE-BSD FRML MDRD: 75 ML/MIN/1.73SQ M
GFR SERPL CREATININE-BSD FRML MDRD: 91 ML/MIN/1.73SQ M
GLUCOSE SERPL-MCNC: 122 MG/DL (ref 65–140)
GLUCOSE SERPL-MCNC: 134 MG/DL (ref 65–140)
GLUCOSE UR STRIP-MCNC: NEGATIVE MG/DL
HCT VFR BLD AUTO: 41.7 % (ref 36.5–49.3)
HCT VFR BLD AUTO: 46.4 % (ref 36.5–49.3)
HGB BLD-MCNC: 14 G/DL (ref 12–17)
HGB BLD-MCNC: 15.5 G/DL (ref 12–17)
HGB UR QL STRIP.AUTO: NEGATIVE
IMM GRANULOCYTES # BLD AUTO: 0.22 THOUSAND/UL (ref 0–0.2)
IMM GRANULOCYTES NFR BLD AUTO: 1 % (ref 0–2)
INR PPP: 0.98 (ref 0.84–1.19)
KETONES UR STRIP-MCNC: NEGATIVE MG/DL
L PNEUMO1 AG UR QL IA.RAPID: NEGATIVE
LACTATE SERPL-SCNC: 1.5 MMOL/L (ref 0.5–2)
LEUKOCYTE ESTERASE UR QL STRIP: NEGATIVE
LYMPHOCYTES # BLD AUTO: 1 THOUSANDS/ÂΜL (ref 0.6–4.47)
LYMPHOCYTES NFR BLD AUTO: 4 % (ref 14–44)
MAGNESIUM SERPL-MCNC: 1.6 MG/DL (ref 1.9–2.7)
MCH RBC QN AUTO: 30.8 PG (ref 26.8–34.3)
MCH RBC QN AUTO: 31 PG (ref 26.8–34.3)
MCHC RBC AUTO-ENTMCNC: 33.4 G/DL (ref 31.4–37.4)
MCHC RBC AUTO-ENTMCNC: 33.6 G/DL (ref 31.4–37.4)
MCV RBC AUTO: 92 FL (ref 82–98)
MCV RBC AUTO: 92 FL (ref 82–98)
MONOCYTES # BLD AUTO: 1.99 THOUSAND/ÂΜL (ref 0.17–1.22)
MONOCYTES NFR BLD AUTO: 8 % (ref 4–12)
NEUTROPHILS # BLD AUTO: 22.02 THOUSANDS/ÂΜL (ref 1.85–7.62)
NEUTS SEG NFR BLD AUTO: 87 % (ref 43–75)
NITRITE UR QL STRIP: NEGATIVE
NRBC BLD AUTO-RTO: 0 /100 WBCS
P AXIS: 61 DEGREES
PH UR STRIP.AUTO: 5.5 [PH]
PHOSPHATE SERPL-MCNC: 3.5 MG/DL (ref 2.3–4.1)
PLATELET # BLD AUTO: 180 THOUSANDS/UL (ref 149–390)
PLATELET # BLD AUTO: 191 THOUSANDS/UL (ref 149–390)
PMV BLD AUTO: 10.1 FL (ref 8.9–12.7)
PMV BLD AUTO: 10.6 FL (ref 8.9–12.7)
POTASSIUM SERPL-SCNC: 3.6 MMOL/L (ref 3.5–5.3)
POTASSIUM SERPL-SCNC: 4.7 MMOL/L (ref 3.5–5.3)
PR INTERVAL: 166 MS
PROCALCITONIN SERPL-MCNC: 0.45 NG/ML
PROCALCITONIN SERPL-MCNC: 0.55 NG/ML
PROT SERPL-MCNC: 6.8 G/DL (ref 6.4–8.4)
PROT UR STRIP-MCNC: NEGATIVE MG/DL
PROTHROMBIN TIME: 13.7 SECONDS (ref 11.6–14.5)
QRS AXIS: -65 DEGREES
QRSD INTERVAL: 82 MS
QT INTERVAL: 350 MS
QTC INTERVAL: 444 MS
RBC # BLD AUTO: 4.52 MILLION/UL (ref 3.88–5.62)
RBC # BLD AUTO: 5.04 MILLION/UL (ref 3.88–5.62)
RSV RNA RESP QL NAA+PROBE: NEGATIVE
S PNEUM AG UR QL: NEGATIVE
SARS-COV-2 RNA RESP QL NAA+PROBE: NEGATIVE
SODIUM SERPL-SCNC: 135 MMOL/L (ref 135–147)
SODIUM SERPL-SCNC: 136 MMOL/L (ref 135–147)
SP GR UR STRIP.AUTO: 1.01 (ref 1–1.03)
T WAVE AXIS: 71 DEGREES
UROBILINOGEN UR STRIP-ACNC: <2 MG/DL
VENTRICULAR RATE: 97 BPM
WBC # BLD AUTO: 23.67 THOUSAND/UL (ref 4.31–10.16)
WBC # BLD AUTO: 25.39 THOUSAND/UL (ref 4.31–10.16)

## 2023-06-23 PROCEDURE — 71045 X-RAY EXAM CHEST 1 VIEW: CPT

## 2023-06-23 PROCEDURE — 81003 URINALYSIS AUTO W/O SCOPE: CPT | Performed by: PHYSICIAN ASSISTANT

## 2023-06-23 PROCEDURE — 93005 ELECTROCARDIOGRAM TRACING: CPT

## 2023-06-23 PROCEDURE — 87040 BLOOD CULTURE FOR BACTERIA: CPT | Performed by: EMERGENCY MEDICINE

## 2023-06-23 PROCEDURE — 83735 ASSAY OF MAGNESIUM: CPT | Performed by: PHYSICIAN ASSISTANT

## 2023-06-23 PROCEDURE — 80053 COMPREHEN METABOLIC PANEL: CPT | Performed by: EMERGENCY MEDICINE

## 2023-06-23 PROCEDURE — 80048 BASIC METABOLIC PNL TOTAL CA: CPT | Performed by: PHYSICIAN ASSISTANT

## 2023-06-23 PROCEDURE — 94664 DEMO&/EVAL PT USE INHALER: CPT

## 2023-06-23 PROCEDURE — 92611 MOTION FLUOROSCOPY/SWALLOW: CPT

## 2023-06-23 PROCEDURE — 99285 EMERGENCY DEPT VISIT HI MDM: CPT

## 2023-06-23 PROCEDURE — 84145 PROCALCITONIN (PCT): CPT | Performed by: PHYSICIAN ASSISTANT

## 2023-06-23 PROCEDURE — 74230 X-RAY XM SWLNG FUNCJ C+: CPT

## 2023-06-23 PROCEDURE — 85027 COMPLETE CBC AUTOMATED: CPT | Performed by: PHYSICIAN ASSISTANT

## 2023-06-23 PROCEDURE — 94640 AIRWAY INHALATION TREATMENT: CPT

## 2023-06-23 PROCEDURE — 94760 N-INVAS EAR/PLS OXIMETRY 1: CPT

## 2023-06-23 PROCEDURE — 84100 ASSAY OF PHOSPHORUS: CPT | Performed by: PHYSICIAN ASSISTANT

## 2023-06-23 PROCEDURE — 96375 TX/PRO/DX INJ NEW DRUG ADDON: CPT

## 2023-06-23 PROCEDURE — 83605 ASSAY OF LACTIC ACID: CPT | Performed by: EMERGENCY MEDICINE

## 2023-06-23 PROCEDURE — 94669 MECHANICAL CHEST WALL OSCILL: CPT

## 2023-06-23 PROCEDURE — 0241U HB NFCT DS VIR RESP RNA 4 TRGT: CPT | Performed by: EMERGENCY MEDICINE

## 2023-06-23 PROCEDURE — 99223 1ST HOSP IP/OBS HIGH 75: CPT | Performed by: INTERNAL MEDICINE

## 2023-06-23 PROCEDURE — 85610 PROTHROMBIN TIME: CPT | Performed by: EMERGENCY MEDICINE

## 2023-06-23 PROCEDURE — 93010 ELECTROCARDIOGRAM REPORT: CPT | Performed by: INTERNAL MEDICINE

## 2023-06-23 PROCEDURE — 85025 COMPLETE CBC W/AUTO DIFF WBC: CPT | Performed by: EMERGENCY MEDICINE

## 2023-06-23 PROCEDURE — 92610 EVALUATE SWALLOWING FUNCTION: CPT

## 2023-06-23 PROCEDURE — 84484 ASSAY OF TROPONIN QUANT: CPT | Performed by: EMERGENCY MEDICINE

## 2023-06-23 PROCEDURE — 85730 THROMBOPLASTIN TIME PARTIAL: CPT | Performed by: EMERGENCY MEDICINE

## 2023-06-23 PROCEDURE — 84145 PROCALCITONIN (PCT): CPT | Performed by: EMERGENCY MEDICINE

## 2023-06-23 PROCEDURE — 87449 NOS EACH ORGANISM AG IA: CPT | Performed by: PHYSICIAN ASSISTANT

## 2023-06-23 PROCEDURE — 36415 COLL VENOUS BLD VENIPUNCTURE: CPT | Performed by: EMERGENCY MEDICINE

## 2023-06-23 PROCEDURE — 96365 THER/PROPH/DIAG IV INF INIT: CPT

## 2023-06-23 PROCEDURE — 94668 MNPJ CHEST WALL SBSQ: CPT

## 2023-06-23 RX ORDER — HEPARIN SODIUM 5000 [USP'U]/ML
5000 INJECTION, SOLUTION INTRAVENOUS; SUBCUTANEOUS EVERY 8 HOURS SCHEDULED
Status: DISCONTINUED | OUTPATIENT
Start: 2023-06-23 | End: 2023-06-27 | Stop reason: HOSPADM

## 2023-06-23 RX ORDER — SODIUM CHLORIDE, SODIUM GLUCONATE, SODIUM ACETATE, POTASSIUM CHLORIDE, MAGNESIUM CHLORIDE, SODIUM PHOSPHATE, DIBASIC, AND POTASSIUM PHOSPHATE .53; .5; .37; .037; .03; .012; .00082 G/100ML; G/100ML; G/100ML; G/100ML; G/100ML; G/100ML; G/100ML
100 INJECTION, SOLUTION INTRAVENOUS CONTINUOUS
Status: DISPENSED | OUTPATIENT
Start: 2023-06-23 | End: 2023-06-23

## 2023-06-23 RX ORDER — CEFTRIAXONE 1 G/50ML
1000 INJECTION, SOLUTION INTRAVENOUS EVERY 24 HOURS
Status: DISCONTINUED | OUTPATIENT
Start: 2023-06-23 | End: 2023-06-27 | Stop reason: HOSPADM

## 2023-06-23 RX ORDER — METOPROLOL SUCCINATE 25 MG/1
25 TABLET, EXTENDED RELEASE ORAL DAILY
Status: DISCONTINUED | OUTPATIENT
Start: 2023-06-24 | End: 2023-06-27 | Stop reason: HOSPADM

## 2023-06-23 RX ORDER — ALBUTEROL SULFATE 2.5 MG/3ML
2.5 SOLUTION RESPIRATORY (INHALATION) EVERY 6 HOURS PRN
Status: DISCONTINUED | OUTPATIENT
Start: 2023-06-23 | End: 2023-06-27 | Stop reason: HOSPADM

## 2023-06-23 RX ORDER — PRAVASTATIN SODIUM 80 MG/1
80 TABLET ORAL
Status: DISCONTINUED | OUTPATIENT
Start: 2023-06-24 | End: 2023-06-27 | Stop reason: HOSPADM

## 2023-06-23 RX ORDER — CEFEPIME HYDROCHLORIDE 2 G/50ML
2000 INJECTION, SOLUTION INTRAVENOUS EVERY 12 HOURS
Status: DISCONTINUED | OUTPATIENT
Start: 2023-06-23 | End: 2023-06-23

## 2023-06-23 RX ORDER — PANTOPRAZOLE SODIUM 40 MG/1
40 TABLET, DELAYED RELEASE ORAL
Status: DISCONTINUED | OUTPATIENT
Start: 2023-06-24 | End: 2023-06-27 | Stop reason: HOSPADM

## 2023-06-23 RX ORDER — ONDANSETRON 2 MG/ML
4 INJECTION INTRAMUSCULAR; INTRAVENOUS EVERY 6 HOURS PRN
Status: DISCONTINUED | OUTPATIENT
Start: 2023-06-23 | End: 2023-06-27 | Stop reason: HOSPADM

## 2023-06-23 RX ORDER — ALBUTEROL SULFATE 2.5 MG/3ML
5 SOLUTION RESPIRATORY (INHALATION) ONCE
Status: COMPLETED | OUTPATIENT
Start: 2023-06-23 | End: 2023-06-23

## 2023-06-23 RX ORDER — MAGNESIUM SULFATE HEPTAHYDRATE 40 MG/ML
2 INJECTION, SOLUTION INTRAVENOUS ONCE
Status: COMPLETED | OUTPATIENT
Start: 2023-06-23 | End: 2023-06-23

## 2023-06-23 RX ORDER — METRONIDAZOLE 500 MG/100ML
500 INJECTION, SOLUTION INTRAVENOUS EVERY 8 HOURS
Status: DISCONTINUED | OUTPATIENT
Start: 2023-06-23 | End: 2023-06-27 | Stop reason: HOSPADM

## 2023-06-23 RX ORDER — POLYETHYLENE GLYCOL 3350 17 G/17G
17 POWDER, FOR SOLUTION ORAL DAILY
COMMUNITY

## 2023-06-23 RX ORDER — POLYETHYLENE GLYCOL 3350 17 G/17G
17 POWDER, FOR SOLUTION ORAL DAILY
Status: DISCONTINUED | OUTPATIENT
Start: 2023-06-24 | End: 2023-06-27 | Stop reason: HOSPADM

## 2023-06-23 RX ORDER — BUPROPION HYDROCHLORIDE 100 MG/1
100 TABLET ORAL DAILY
Status: DISCONTINUED | OUTPATIENT
Start: 2023-06-24 | End: 2023-06-27 | Stop reason: HOSPADM

## 2023-06-23 RX ADMIN — METRONIDAZOLE 500 MG: 5 INJECTION, SOLUTION INTRAVENOUS at 12:33

## 2023-06-23 RX ADMIN — ONDANSETRON 4 MG: 2 INJECTION INTRAMUSCULAR; INTRAVENOUS at 17:56

## 2023-06-23 RX ADMIN — SODIUM CHLORIDE, SODIUM GLUCONATE, SODIUM ACETATE, POTASSIUM CHLORIDE, MAGNESIUM CHLORIDE, SODIUM PHOSPHATE, DIBASIC, AND POTASSIUM PHOSPHATE 100 ML/HR: .53; .5; .37; .037; .03; .012; .00082 INJECTION, SOLUTION INTRAVENOUS at 05:56

## 2023-06-23 RX ADMIN — ALBUTEROL SULFATE 2.5 MG: 2.5 SOLUTION RESPIRATORY (INHALATION) at 12:45

## 2023-06-23 RX ADMIN — CEFTRIAXONE 1000 MG: 1 INJECTION, SOLUTION INTRAVENOUS at 13:10

## 2023-06-23 RX ADMIN — HEPARIN SODIUM 5000 UNITS: 5000 INJECTION INTRAVENOUS; SUBCUTANEOUS at 06:08

## 2023-06-23 RX ADMIN — ALBUTEROL SULFATE 5 MG: 2.5 SOLUTION RESPIRATORY (INHALATION) at 02:10

## 2023-06-23 RX ADMIN — VANCOMYCIN HYDROCHLORIDE 750 MG: 750 INJECTION, SOLUTION INTRAVENOUS at 02:19

## 2023-06-23 RX ADMIN — MAGNESIUM SULFATE HEPTAHYDRATE 2 G: 2 INJECTION, SOLUTION INTRAVENOUS at 13:03

## 2023-06-23 RX ADMIN — HEPARIN SODIUM 5000 UNITS: 5000 INJECTION INTRAVENOUS; SUBCUTANEOUS at 20:48

## 2023-06-23 RX ADMIN — HEPARIN SODIUM 5000 UNITS: 5000 INJECTION INTRAVENOUS; SUBCUTANEOUS at 14:25

## 2023-06-23 RX ADMIN — METRONIDAZOLE 500 MG: 5 INJECTION, SOLUTION INTRAVENOUS at 20:48

## 2023-06-23 RX ADMIN — CEFEPIME HYDROCHLORIDE 2000 MG: 2 INJECTION, SOLUTION INTRAVENOUS at 01:38

## 2023-06-23 NOTE — PROCEDURES
"                                   Video Swallow Study      Patient Name: Duane Hassan  AOLSG'F Date: 6/23/2023        Past Medical History  Past Medical History:   Diagnosis Date   • Depression    • GERD (gastroesophageal reflux disease)    • Intellectual disability    • Scoliosis, unspecified scoliosis type, unspecified spinal region         Past Surgical History  Past Surgical History:   Procedure Laterality Date   • APPENDECTOMY     • CHOLECYSTECTOMY     • HERNIA REPAIR     • PANCREAS SURGERY N/A     Removal of a mass perhaps 2018   • PROSTATE SURGERY N/A     \"Laser prostate surgery \"       Modified (Video) Barium Swallow Study    Summary:  Images are on PACS for review  Pt presents w/ mild oropharyngeal dysphagia rowena by prolonged anterior mastication, reduced oral control w/ anterior loss and premature posterior spill, and variable swallow delay  Mild reduced anterior hyoid excursion  Complete laryngeal elevation and laryngeal vestibule closure  Inconsistent epiglottic inversion, at times w/ curled tip remaining and subsequent vallecular retention  No penetration/aspiration on today's study  Suspect choking incidents 2/2 impulsivity/rapid intake, pt requires consistent cues for slow rate  Of note, esophageal retention w/ backflow visualized several times throughout study - cough often associated w/ backflow  VBS findings and recommendations immediately reviewed w/ pt and pt's sister w/ use of images to aid in understanding  Education provided on strategies to optimize swallow safety, including the importance of oral care and s/s aspiration to monitor for and notify medical team of should they arise  Pt verbalized understanding and denied questions at this time  Recommendations:  Diet: Dysphagia 3   Liquids:  Thin   Meds: Whole w/ liquid   Strategies: Slow rate   Frequent oral care  Upright position  F/u ST tx: Yes   Therapy Prognosis: Good   Prognosis considerations: Age, past medical, current " "medical   Close Supervision  Aspiration Precautions  Reflux Precautions  Consider consult with: -   Results reviewed with: pt, nursing, family, physician   Aspiration precautions posted  Repeat MBS as necessary  If a dedicated assessment of the esophagus is desired, consider esophagram/barium swallow or EGD  Goals:  Pt will tolerate least restrictive diet w/out s/s aspiration or oral/pharyngeal difficulties  Patient's goal: \"Shante I'm a little tired\"       H&P/pertinent provider notes: (PMH noted above)  Pt is a 65 y o  male who presented to [facility] with  history of intellectual disability, GERD, depression presents for evaluation of cough, hypoxia on home O2 monitor 85 to 90%   Also fever with Tmax of 101   Mom who is the primary historian reports that patient has been having swallowing issues and is undergoing a speech eval soon, this weekend he was at a restaurant and had a choking episode which caused him To cough a lot  Special Studies:  CXR 6/23: No acute disease in the chest     Previous VBS:  No         Does the pt have pain? No   If yes, was nursing made aware/was it addressed? N/A     Swallow Mechanism Exam  Facial: symmetrical  Labial: WFL  Lingual: WFL  Velum: symmetrical  Mandible: adequate ROM  Dentition: adequate  Vocal quality:clear/adequate   Volitional Cough: strong/productive   Respiratory Status: on RA    Swallow Information   Current Risks for Dysphagia & Aspiration: known history of dysphagia  Current Symptoms/Concerns: coughing with po and choking incident  Current Diet: NPO   Baseline Diet: regular diet and thin liquids      Consistencies Administered:  Pt was viewed sitting upright in the lateral and AP positions   Trials administered were consistent with MBSImP Validated Protocol: 5-mL thin liquid x2, 20-mL cup sip thin, 40-mL sequential swallow thin, 5-mL nectar thick, 20-mL cup sip nectar thick, 40-mL sequential swallow nectar thick, 5-mL Honey thick, 5-mL " pudding, ½ cookie coated with 3-mL pudding, 5-mL nectar thick in the AP position, and 5-mL pudding in the AP position  Pt was also given thin liquids by straw, as well as a barium tablet with thin liquid       Oral Impairment:  Lip Closure: incomplete, anterior loss   Tongue Control During Bolus Hold: reduced w/ premature spill into pharynx   Bolus Preparation/Mastication: prolonged anterior munching   Bolus Transport/Lingual Motion: rapid lingual pumping   Oral Residue: mild w/ solids   Initiation of the Pharyngeal Swallow: delayed w/ bolus head to pyriforms      Pharyngeal Impairment:   Soft Palate Elevation: complete   Laryngeal Elevation: complete   Anterior Hyoid Excursion: reduced  Epiglottic Movement: inconsistent movement, at times remains curled   Laryngeal Vestibular Closure: complete   Pharyngeal Stripping Wave: fair    Pharyngeal Contraction: complete   PES Opening: constricted   Tongue Base Retraction:  Complete   Pharyngeal Residue: mild vallecular w/ solids     Screening of Esophageal Impairment   Esophageal Clearance: retention w/ retropulsion       Penetration/Aspiration:  Thin:  No penetration/aspiration (PAS 1)   Nectar: No penetration/aspiration (PAS 1)   Honey: No penetration/aspiration (PAS 1)   Puree: No penetration/aspiration (PAS 1)   Solid: No penetration/aspiration (PAS 1)   Response to Aspiration: -   Strategies/Efficacy: -     8-Point Penetration-Aspiration Scale   1 Material does not enter the airway   2 Material enters the airway, remains above the vocal folds, and is ejected  from the  airway    3 Material enters the airway, remains above the vocal folds, and is not ejected from the airway   4 Material enters the airway, contacts the vocal folds, and is ejected from the airway   5 Material enters the airway, contacts the vocal folds, and is not ejected from the airway    6 Material enters the airway, passes below the vocal folds and is ejected into the larynx or out of the airway    7 Material enters the airway, passes below the vocal folds, and is not ejected from the trachea despite effort    8 Material enters the airway, passes below the vocal folds, and no effort is made to eject

## 2023-06-23 NOTE — SPEECH THERAPY NOTE
"Speech Language/Pathology    Speech-Language Pathology Bedside Swallow Evaluation      Patient Name: Zachery HEART Date: 6/23/2023     Problem List  Principal Problem:    Aspiration pneumonia ([facility] )  Active Problems:    GERD (gastroesophageal reflux disease)    Intellectual disability    Dysphagia    Depression    Primary hypertension    Sepsis without acute organ dysfunction ([facility] )    Acute respiratory insufficiency      Past Medical History  Past Medical History:   Diagnosis Date   • Depression    • GERD (gastroesophageal reflux disease)    • Intellectual disability    • Scoliosis, unspecified scoliosis type, unspecified spinal region        Past Surgical History  Past Surgical History:   Procedure Laterality Date   • APPENDECTOMY     • CHOLECYSTECTOMY     • HERNIA REPAIR     • PANCREAS SURGERY N/A     Removal of a mass perhaps 2018   • PROSTATE SURGERY N/A     \"Laser prostate surgery \"       Summary   Pt presented with s/s suggestive of at least mild oropharyngeal dysphagia  Somewhat rapid and impulsive intake of all material w/ rapid transfers  Mastication rowena by anterior munching w/ open oral cavity  Ultimately pt able to break down all material  Swallows suspected fairly prompt  Intermittent cough response throughout trials  Pt w/ cough upon arrival in the absence of PO  Given pt's hx and c/o worsening dysphagia, pt would benefit from instrumental assessment to guide tx plan and provide appropriate recommendations  Pt/pt's sister understanding of rationale for MBS, agreeable to participation in study         Risk/s for Aspiration: Mild      Recommended Diet: Defer until MBS    Recommended Form of Meds: non-oral if possible   Aspiration precautions and swallowing strategies: upright posture  Other Recommendations: Continue frequent oral care         Current Medical Status  Pt is a 65 y o  male who presented to [address] [place] [address] with  history of intellectual disability, GERD, depression presents for " evaluation of cough, hypoxia on home O2 monitor 85 to 90%  Also fever with Tmax of 101  Mom who is the primary historian reports that patient has been having swallowing issues and is undergoing a speech eval soon, this weekend he was at a restaurant and had a choking episode which caused him To cough a lot  Current Precautions:    Aspiration     Allergies:  No known food allergies    Past medical history:  Please see H&P for details    Special Studies:      Social/Education/Vocational Hx:  Pt lives with family    Swallow Information   Current Risks for Dysphagia & Aspiration: known history of dysphagia and medical hx  Current Symptoms/Concerns: coughing with po, choking incident and change in respiratory status  Current Diet: NPO   Baseline Diet: regular diet and thin liquids      Baseline Assessment   Behavior/Cognition: alert  Speech/Language Status: able to participate in conversation and able to follow commands  Patient Positioning: upright in bed  Pain Status/Interventions/Response to Interventions:  No report of or nonverbal indications of pain  Swallow Mechanism Exam  Facial: symmetrical  Labial: WFL  Lingual: decreased ROM  Velum: symmetrical  Mandible: adequate ROM  Dentition: adequate  Vocal quality:clear/adequate   Volitional Cough: strong/productive   Respiratory Status: on NC    Consistencies Assessed and Performance   Consistencies Administered: thin liquids, puree, soft solids and hard solids    Oral Stage: mild  Anterior munching mastication w/ open oral posture  Bolus formation and transfer were rapid though appear functional with no significant oral residue noted  Pharyngeal Stage: mild  Swallow Mechanics:  Swallowing initiation appeared fairly prompt, ?delay  Laryngeal rise was palpated and judged to be within functional limits  Intermittent productive coughing throughout evaluation noted with and without PO       Esophageal Concerns: none reported    Strategies and Efficacy: - "    Summary and Recommendations (see above)    Results Reviewed with: patient, RN, MD and family     Treatment Recommended: Yes     Frequency of treatment: As able/appropriate, MBS scheduled for today at 1300     Patient Stated Goal: \"How are you today? \"     Dysphagia LTG  -Patient will demonstrate safe and effective oral intake (without overt s/s significant oral/pharyngeal dysphagia including s/s penetration or aspiration) for the highest appropriate diet level       Short Term Goals:  -Patient will comply with a Video/Modified Barium Swallow study for more complete assessment of swallowing anatomy/physiology/aspiration risk and to assess efficacy of treatment techniques so as to best guide treatment plan      Speech Therapy Prognosis   Prognosis: fair    Prognosis Considerations: age, medical status, prior medical history and cognitive status      "

## 2023-06-23 NOTE — CASE MANAGEMENT
Case Management Assessment & Discharge Planning Note    Patient name Thais Lynch  Location ED 04/ED 04 MRN [id number]  : 1960 Date 2023       Current Admission Date: 2023  Current Admission Diagnosis:Aspiration pneumonia [facility])   Patient Active Problem List    Diagnosis Date Noted   • Dysphagia 2023   • Aspiration pneumonia ([address] ) 2023   • Depression 2023   • Primary hypertension 2023   • Sepsis without acute organ dysfunction ([address] ) 2023   • Acute respiratory insufficiency 2023   • Intellectual disability 2023   • Shantell Hobby Syndrome 2023   • Neuroendocrine carcinoma ([address] ) 2023   • Kyphoscoliosis and scoliosis 2023   • Recurrent major depressive disorder ([address] ) 10/04/2022   • Abnormal gait 2021   • Sensorineural hearing loss 2019   • Urinary incontinence 2018   • Anxiety 11/15/2016   • Allergic rhinitis 10/14/2016   • BPH (benign prostatic hyperplasia) 10/14/2016   • GERD (gastroesophageal reflux disease) 10/14/2016   • Mixed hyperlipidemia 10/14/2016   • Other psoriasis 10/14/2016   • Neuroendocrine tumor of pancreas 2014      LOS (days): 0  Geometric Mean LOS (GMLOS) (days): 5 00  Days to GMLOS:4 6     OBJECTIVE:    Risk of Unplanned Readmission Score: 9 1         Current admission status: Inpatient  Referral Reason: Other (post acute needs)    Preferred Pharmacy:   [facility] [address] [place] [id number]  Phone: [phone] Fax: [phone]    Primary Care Provider: Umang Dey DO    Primary Insurance: MEDICARE  Secondary Insurance:     ASSESSMENT:  [address], [address] [facility] Representative - Sister   Primary Phone: [phone] (Mobile)               Advance Directives  Does patient have a [address]?: Yes  Does patient have Advance Directives?: Yes  Advance Directives: Power of  for finance  Primary Contact: Genna Copeland ( Financial POA)         Readmission Root Cause  30 Day Readmission: No    Patient Information  Admitted from[de-identified] Home  Mental Status: Alert  During Assessment patient was accompanied by: Sister  Assessment information provided by[de-identified] Patient, Sister  Primary Caregiver: Self  Support Systems: Self, Family members  [place] of Residence: [address] [place] [address] do you live in?: [address] entry access options   Select all that apply : Stairs  Number of steps to enter home : 4  Do the steps have railings?: Yes  Type of Current Residence: 3 story home  Upon entering residence, is there a bedroom on the main floor (no further steps)?: No  A bedroom is located on the following floor levels of residence (select all that apply):: 2nd Floor  Upon entering residence, is there a bathroom on the main floor (no further steps)?: Yes  Number of steps to 2nd floor from main floor: One Flight  In the last 12 months, was there a time when you were not able to pay the mortgage or rent on time?: No  In the last 12 months, how many places have you lived?: 1  In the last 12 months, was there a time when you did not have a steady place to sleep or slept in a shelter (including now)?: No  Homeless/housing insecurity resource given?: N/A  Living Arrangements: Lives w/ Extended Family  Is patient a ?: No    Activities of Daily Living Prior to Admission  Functional Status: Independent  Completes ADLs independently?: Yes  Ambulates independently?: Yes  Does patient use assisted devices?: No  Does patient currently own DME?: No  Does patient have a history of Outpatient Therapy (PT/OT)?: Yes  Does the patient have a history of Short-Term Rehab?: No  Does patient have a history of HHC?: No  Does patient currently have [facility]?: No         Patient Information Continued  Income Source: SSI/SSD  Does patient have prescription coverage?: Yes  Within the past 12 months, you worried that your food would run out before you got the money to buy more : Never true  Within the past 12 months, the food you bought just didn't last and you didn't have money to get more : Never true  Food insecurity resource given?: N/A  Does patient receive dialysis treatments?: No  Does patient have a history of substance abuse?: No  Does patient have a history of Mental Health Diagnosis?: No         Means of Transportation  Means of Transport to Appts[de-identified] Family transport  In the past 12 months, has lack of transportation kept you from medical appointments or from getting medications?: No  In the past 12 months, has lack of transportation kept you from meetings, work, or from getting things needed for daily living?: No  Was application for public transport provided?: N/A        DISCHARGE DETAILS:    Discharge planning discussed with[de-identified] patient and sister at bedside in ED  Freedom of Choice: Yes  Comments - Freedom of Choice: Discussed Dc planning and role of CM  CM contacted family/caregiver?: Yes  Were Treatment Team discharge recommendations reviewed with patient/caregiver?: Yes  Did patient/caregiver verbalize understanding of patient care needs?: Yes       Contacts  Patient Contacts: Sarahy Castro  Relationship to Patient[de-identified] Family  Contact Method: In Person  Reason/Outcome: Discharge [address]         Is the patient interested in [address] at discharge?: No    DME Referral Provided  Referral made for DME?: No    Other Referral/Resources/Interventions Provided:  Interventions: None Indicated  Referral Comments: No needs anticipated pending progress with weaning O2  Discharge Destination Plan[de-identified] Home  Transport at Discharge : Family                                      Additional Comments: Cm met with pt and his sister at bedside in ED  Pt lives with his sister and her family in a 3sth with 4 grady  Pts bedroom is on the 2nd floor  He is indpt with ambualtion and does not require any DME   Pt has had [employer] [facility] in the past  He has no hx of HHC/STR/MH/Da  Pt has participated in OP PT before  Pt sister works for [employer] and his brother in law is an OT  Family is very supportive  Pt is in a day program at [facility] for individuals with ID  pt is his own guardian  His sister is his financial POA only  Pt was in a [address] [place] [address] in [place] but during [place] moved in with sister to be closer to family  Sister has him on a waiting list for a Group Home near [place]  Pt sees [facility] and [facility] provides transportation   Pt currently on O2 in the Ed but is not on O2 at baseline

## 2023-06-23 NOTE — ASSESSMENT & PLAN NOTE
· SIRS criteria met on admission: Tachycardia and leukocytosis  · Suspected sources possible aspiration pneumonia based on clinical symptoms, however chest x-ray is lacking for focal consolidation on unofficial review  · No severe sepsis criteria met  · Continue ceftriaxone for now  · If leukocytosis fails to improve or procalcitonin continues to rise, would obtain CT C/A/P  · UA still pending

## 2023-06-23 NOTE — ASSESSMENT & PLAN NOTE
· Home regimen: Wellbutrin 100 Mg twice daily  · Currently on hold while strict n p o , will need to be reordered once cleared by speech

## 2023-06-23 NOTE — ED NOTES
Pt is sleeping with visible chest rise and fall  Easily awaken  Sister at bedside  VSS  Pt is unable to urinate  Sister is aware that pt needs to give urine sample  Awaiting bed placement  Will continue to monitor        Jere Medrano RN  06/23/23 [id number]

## 2023-06-23 NOTE — LETTER
[facility] MED SURG UNIT  [address] [place] [address]  Dept: [phone]    June 27, 2023     Patient: Annalee Padgett   YOB: 1960   Date of Visit: 6/23/2023       To Whom it May Concern:    Annalee Padgett is under my professional care  He was seen in the hospital from 6/23/2023 to 06/27/23  He  may return to day program activities on 6/29/23 without limitations   If you have any questions or concerns, please don't hesitate to call           Sincerely,          Mayuri Triana PA-C

## 2023-06-23 NOTE — PLAN OF CARE
Problem: Potential for Falls  Goal: Patient will remain free of falls  Description: INTERVENTIONS:  - Educate patient/family on patient safety including physical limitations  - Instruct patient to call for assistance with activity   - Consult OT/PT to assist with strengthening/mobility   - Keep Call bell within reach  - Keep bed low and locked with side rails adjusted as appropriate  - Keep care items and personal belongings within reach  - Initiate and maintain comfort rounds  - Make Fall Risk Sign visible to staff  - Offer Toileting every 2  Problem: Nutrition/Hydration-ADULT  Goal: Nutrient/Hydration intake appropriate for improving, restoring or maintaining nutritional needs  Description: Monitor and assess patient's nutrition/hydration status for malnutrition  Collaborate with interdisciplinary team and initiate plan and interventions as ordered  Monitor patient's weight and dietary intake as ordered or per policy  Utilize nutrition screening tool and intervene as necessary  Determine patient's food preferences and provide high-protein, high-caloric foods as appropriate       INTERVENTIONS:  - Monitor oral intake, urinary output, labs, and treatment plans  - Assess nutrition and hydration status and recommend course of action  - Evaluate amount of meals eaten  - Assist patient with eating if necessary   - Allow adequate time for meals  - Recommend/ encourage appropriate diets, oral nutritional supplements, and vitamin/mineral supplements  - Order, calculate, and assess calorie counts as needed  - Recommend, monitor, and adjust tube feedings and TPN/PPN based on assessed needs  - Assess need for intravenous fluids  - Provide specific nutrition/hydration education as appropriate  - Include patient/family/caregiver in decisions related to nutrition  Outcome: Progressing     Problem: INFECTION - ADULT  Goal: Absence or prevention of progression during hospitalization  Description: INTERVENTIONS:  - Assess and monitor for signs and symptoms of infection  - Monitor lab/diagnostic results  - Monitor all insertion sites, i e  indwelling lines, tubes, and drains  - Monitor endotracheal if appropriate and nasal secretions for changes in amount and color  - [place] appropriate cooling/warming therapies per order  - Administer medications as ordered  - Instruct and encourage patient and family to use good hand hygiene technique  - Identify and instruct in appropriate isolation precautions for identified infection/condition  Outcome: Progressing  Goal: Absence of fever/infection during neutropenic period  Description: INTERVENTIONS:  - Monitor WBC    Outcome: Progressing    Hours, in advance of need  - Initiate/Maintain alarm  - Obtain necessary fall risk management equipment:   - Apply yellow socks and bracelet for high fall risk patients  - Consider moving patient to room near nurses station  Outcome: Progressing

## 2023-06-23 NOTE — ASSESSMENT & PLAN NOTE
· Episode of choking on 6/18 with development of cough 6/21 with worsening 6/22 and development of fever and fatigue  · Chest x-ray without clear pneumonia (? LLL opacity), however difficult to interpret secondary to kyphoscoliosis  · Given cefepime and vancomycin in the ED -de-escalate to ceftriaxone as patient is from home  · Treat as if aspiration pneumonia for now, however if leukocytosis fails to improve or procalcitonin rises would obtain CT C/A/P  · Sputum culture and Gram stain  · Urine strep and Legionella studies  · Respiratory protocol  · Aspiration precautions  · Airway clearance protocol

## 2023-06-23 NOTE — ASSESSMENT & PLAN NOTE
· SPO2 on room air 87 to 88%  · SPO2 stable on 2 L supplemental oxygen via nasal cannula  · Wean oxygen as able  · May need home oxygen evaluation prior to discharge if unable to wean off oxygen

## 2023-06-23 NOTE — ED PROVIDER NOTES
History  Chief Complaint   Patient presents with   • Fever     Fever, cough, congestion since yesterday  Have a home pulse ox and its was 85-90%  28-year-old male with history of intellectual disability, GERD, depression presents for evaluation of cough, hypoxia on home O2 monitor 85 to 90%  Also fever with Tmax of 101  Mom who is the primary historian reports that patient has been having swallowing issues and is undergoing a speech eval soon, this weekend he was at a restaurant and had a choking episode which caused him To cough a lot  Prior to Admission Medications   Prescriptions Last Dose Informant Patient Reported? Taking?   acetaminophen (TYLENOL) 325 mg tablet   No No   Sig: Take 2 tablets (650 mg total) by mouth every 4 (four) hours as needed for moderate pain or fever   buPROPion (WELLBUTRIN) 100 mg tablet   No No   Sig: TAKE 2 TABLETS BY MOUTH 2 TIMES A DAY  ketoconazole (NIZORAL) 2 % shampoo   No No   Sig: Apply 1 application topically in the morning   loratadine (CLARITIN) 10 mg tablet   No No   Sig: Take 1 tablet (10 mg total) by mouth daily as needed for allergies   metoprolol succinate (TOPROL-XL) 25 mg 24 hr tablet   No No   Sig: TAKE 1 TABLET (25 MG TOTAL) BY MOUTH DAILY  olopatadine (PATANOL) 0 1 % ophthalmic solution   No No   Sig: Administer 1 drop to both eyes 2 (two) times a day as needed for allergies   omeprazole (PriLOSEC) 40 MG capsule   No No   Sig: TAKE 1 CAPSULE (40 MG TOTAL) BY MOUTH DAILY     oxybutynin (DITROPAN-XL) 10 MG 24 hr tablet   Yes No   Sig: Take 10 mg by mouth daily   simvastatin (ZOCOR) 40 mg tablet   No No   Sig: TAKE 1 TABLET BY MOUTH DAILY AT BEDTIME   sucralfate (CARAFATE) 1 g tablet   No No   Sig: Take 1 tablet (1 g total) by mouth 2 (two) times a day      Facility-Administered Medications: None       Past Medical History:   Diagnosis Date   • Depression    • GERD (gastroesophageal reflux disease)    • Intellectual disability    • Scoliosis, "unspecified scoliosis type, unspecified spinal region        Past Surgical History:   Procedure Laterality Date   • APPENDECTOMY     • CHOLECYSTECTOMY     • HERNIA REPAIR     • PANCREAS SURGERY N/A     Removal of a mass perhaps 2018   • PROSTATE SURGERY N/A     \"Laser prostate surgery \"       Family History   Problem Relation Age of Onset   • Heart disease Mother    • Parkinsonism Mother    • Heart disease Father    • Alcohol abuse Neg Hx    • Substance Abuse Neg Hx    • Mental illness Neg Hx      I have reviewed and agree with the history as documented  E-Cigarette/Vaping   • E-Cigarette Use Never User      E-Cigarette/Vaping Substances   • Nicotine No    • THC No    • CBD No    • Flavoring No    • Other No    • Unknown No      Social History     Tobacco Use   • Smoking status: Never     Passive exposure: Never   • Smokeless tobacco: Never   Vaping Use   • Vaping Use: Never used   Substance Use Topics   • Alcohol use: Not Currently   • Drug use: Never       Review of Systems   Constitutional: Positive for fatigue and fever  Negative for appetite change and chills  HENT: Negative for rhinorrhea and sore throat  Eyes: Negative for photophobia and visual disturbance  Respiratory: Positive for cough and shortness of breath  Cardiovascular: Negative for chest pain and palpitations  Gastrointestinal: Negative for abdominal pain and diarrhea  Genitourinary: Negative for dysuria, frequency and urgency  Skin: Negative for rash  Neurological: Negative for dizziness and weakness  All other systems reviewed and are negative  Physical Exam  Physical Exam  Vitals and nursing note reviewed  Constitutional:       Appearance: He is well-developed  HENT:      Head: Normocephalic and atraumatic  Right Ear: External ear normal       Left Ear: External ear normal    Eyes:      Conjunctiva/sclera: Conjunctivae normal       Pupils: Pupils are equal, round, and reactive to light     Neck:      Vascular: " No JVD  Trachea: No tracheal deviation  Cardiovascular:      Rate and Rhythm: Normal rate and regular rhythm  Heart sounds: Normal heart sounds  No murmur heard  No friction rub  No gallop  Pulmonary:      Effort: Pulmonary effort is normal  No respiratory distress  Breath sounds: No stridor  No wheezing or rales  Abdominal:      General: There is no distension  Palpations: Abdomen is soft  There is no mass  Tenderness: There is no abdominal tenderness  There is no guarding or rebound  Musculoskeletal:         General: Normal range of motion  Cervical back: Normal range of motion and neck supple  Skin:     General: Skin is warm and dry  Coloration: Skin is not pale  Findings: No erythema or rash  Neurological:      Mental Status: He is alert and oriented to person, place, and time  Cranial Nerves: No cranial nerve deficit           Vital Signs  ED Triage Vitals   Temperature Pulse Respirations Blood Pressure SpO2   06/23/23 0022 06/23/23 0022 06/23/23 0022 06/23/23 0022 06/23/23 0022   99 °F (37 2 °C) 101 20 107/75 92 %      Temp Source Heart Rate Source Patient Position - Orthostatic VS BP Location FiO2 (%)   06/23/23 0022 06/23/23 0022 06/23/23 0022 06/23/23 0022 --   Oral Monitor Sitting Right arm       Pain Score       06/23/23 0200       No Pain           Vitals:    06/23/23 0022 06/23/23 0030 06/23/23 0200   BP: 107/75 113/80 113/77   Pulse: 101 102 102   Patient Position - Orthostatic VS: Sitting Lying Lying         Visual Acuity      ED Medications  Medications   cefepime (MAXIPIME) IVPB (premix in dextrose) 2,000 mg 50 mL (0 mg Intravenous Stopped 6/23/23 0306)   vancomycin (VANCOCIN) IVPB (premix in dextrose) 750 mg 150 mL (750 mg Intravenous New Bag 6/23/23 0219)   albuterol inhalation solution 5 mg (5 mg Nebulization Given 6/23/23 0210)       Diagnostic Studies  Results Reviewed     Procedure Component Value Units Date/Time    FLU/RSV/COVID - if FLU/RSV clinically relevant [[id number]]  (Normal) Collected: 06/23/23 0100    Lab Status: Final result Specimen: Nares from Nose Updated: 06/23/23 0144     SARS-CoV-2 Negative     INFLUENZA A PCR Negative     INFLUENZA B PCR Negative     RSV PCR Negative    Narrative:      FOR PEDIATRIC PATIENTS - copy/paste COVID Guidelines URL to browser: [email or web address]  [email or web address]    SARS-CoV-2 assay is a Nucleic Acid Amplification assay intended for the  qualitative detection of nucleic acid from SARS-CoV-2 in nasopharyngeal  swabs  Results are for the presumptive identification of SARS-CoV-2 RNA  Positive results are indicative of infection with SARS-CoV-2, the virus  causing COVID-19, but do not rule out bacterial infection or co-infection  with other viruses  Laboratories within the [place] and its  territories are required to report all positive results to the appropriate  public health authorities  Negative results do not preclude SARS-CoV-2  infection and should not be used as the sole basis for treatment or other  patient management decisions  Negative results must be combined with  clinical observations, patient history, and epidemiological information  This test has not been FDA cleared or approved  This test has been authorized by FDA under an Emergency Use Authorization  (EUA)  This test is only authorized for the duration of time the  declaration that circumstances exist justifying the authorization of the  emergency use of an in vitro diagnostic tests for detection of SARS-CoV-2  virus and/or diagnosis of COVID-19 infection under section 564(b)(1) of  the Act, 21 U  S C  922JFQ-0(O)(2), unless the authorization is terminated  or revoked sooner  The test has been validated but independent review by FDA  and CLIA is pending  Test performed using [employer]: This RT-PCR assay targets N2,  a region unique to SARS-CoV-2   A conserved region in the E-gene was chosen  for pan-Sarbecovirus detection which includes SARS-CoV-2  According to CMS-2020-01-R, this platform meets the definition of high-throughput technology  Procalcitonin [[id number]]  (Abnormal) Collected: 06/23/23 0059    Lab Status: Final result Specimen: Blood from Arm, Right Updated: 06/23/23 0134     Procalcitonin 0 45 ng/ml     HS Troponin 0hr (reflex protocol) [[id number]]  (Normal) Collected: 06/23/23 0059    Lab Status: Final result Specimen: Blood from Arm, Right Updated: 06/23/23 0130     hs TnI 0hr 2 ng/L     Lactic acid [[id number]]  (Normal) Collected: 06/23/23 0059    Lab Status: Final result Specimen: Blood from Arm, Right Updated: 06/23/23 0122     LACTIC ACID 1 5 mmol/L     Narrative:      Result may be elevated if tourniquet was used during collection      Comprehensive metabolic panel [[id number]]  (Abnormal) Collected: 06/23/23 0059    Lab Status: Final result Specimen: Blood from Arm, Right Updated: 06/23/23 0122     Sodium 135 mmol/L      Potassium 4 7 mmol/L      Chloride 104 mmol/L      CO2 23 mmol/L      ANION GAP 8 mmol/L      BUN 19 mg/dL      Creatinine 1 06 mg/dL      Glucose 134 mg/dL      Calcium 9 3 mg/dL      AST 45 U/L      ALT 36 U/L      Alkaline Phosphatase 115 U/L      Total Protein 6 8 g/dL      Albumin 4 3 g/dL      Total Bilirubin 0 88 mg/dL      eGFR 75 ml/min/1 73sq m     Narrative:      [facility] guidelines for Chronic Kidney Disease (CKD):   •  Stage 1 with normal or high GFR (GFR > 90 mL/min/1 73 square meters)  •  Stage 2 Mild CKD (GFR = 60-89 mL/min/1 73 square meters)  •  Stage 3A Moderate CKD (GFR = 45-59 mL/min/1 73 square meters)  •  Stage 3B Moderate CKD (GFR = 30-44 mL/min/1 73 square meters)  •  Stage 4 Severe CKD (GFR = 15-29 mL/min/1 73 square meters)  •  Stage 5 End Stage CKD (GFR <15 mL/min/1 73 square meters)  Note: GFR calculation is accurate only with a steady state creatinine    Protime-INR [[id number]]  (Normal) Collected: 06/23/23 0059    Lab Status: Final result Specimen: Blood from Arm, Right Updated: 06/23/23 0119     Protime 13 7 seconds      INR 0 98    APTT [[id number]]  (Normal) Collected: 06/23/23 0059    Lab Status: Final result Specimen: Blood from Arm, Right Updated: 06/23/23 0119     PTT 23 seconds     CBC and differential [[id number]]  (Abnormal) Collected: 06/23/23 0059    Lab Status: Final result Specimen: Blood from Arm, Right Updated: 06/23/23 0108     WBC 25 39 Thousand/uL      RBC 5 04 Million/uL      Hemoglobin 15 5 g/dL      Hematocrit 46 4 %      MCV 92 fL      MCH 30 8 pg      MCHC 33 4 g/dL      RDW 12 6 %      MPV 10 1 fL      Platelets 216 Thousands/uL      nRBC 0 /100 WBCs      Neutrophils Relative 87 %      Immat GRANS % 1 %      Lymphocytes Relative 4 %      Monocytes Relative 8 %      Eosinophils Relative 0 %      Basophils Relative 0 %      Neutrophils Absolute 22 02 Thousands/µL      Immature Grans Absolute 0 22 Thousand/uL      Lymphocytes Absolute 1 00 Thousands/µL      Monocytes Absolute 1 99 Thousand/µL      Eosinophils Absolute 0 05 Thousand/µL      Basophils Absolute 0 11 Thousands/µL     Blood culture #1 [[id number]] Collected: 06/23/23 0058    Lab Status: In process Specimen: Blood from Arm, Right Updated: 06/23/23 0105    Blood culture #2 [[id number]] Collected: 06/23/23 0100    Lab Status:  In process Specimen: Blood from Arm, Right Updated: 06/23/23 0105    UA w Reflex to Microscopic w Reflex to Culture [[id number]]     Lab Status: No result Specimen: Urine                  XR chest portable   ED Interpretation by Scott Llanos DO (06/23 4291)   Left lower lobe opacity, concerning for pneumonia                 Procedures  CriticalCare Time    Date/Time: 6/23/2023 2:53 AM    Performed by: Scott Llanos DO  Authorized by: Scott Llanos DO    Critical care provider statement:     Critical care time (minutes):  35    Critical care time was exclusive of:  Teaching time and separately billable procedures and treating other patients    Critical care was necessary to treat or prevent imminent or life-threatening deterioration of the following conditions:  Respiratory failure    Critical care was time spent personally by me on the following activities:  Blood draw for specimens, obtaining history from patient or surrogate, development of treatment plan with patient or surrogate, evaluation of patient's response to treatment, examination of patient, ordering and performing treatments and interventions, ordering and review of laboratory studies, ordering and review of radiographic studies, re-evaluation of patient's condition and review of old charts    I assumed direction of critical care for this patient from another provider in my specialty: no               ED Course  ED Course as of 06/23/23 0335   Fri Jun 23, 2023   0035 Procedure Note: EKG  Date/Time: 06/23/23 12:36 AM   Performed by: Fanny Oneil  Authorized by: Fanny Oneil  Indications / Diagnosis: CP  ECG reviewed by me, the ED Provider: yes   The EKG demonstrates:  Rhythm: normal sinus  Intervals: normal intervals  Axis: normal axis  QRS/Blocks: normal QRS  ST Changes: No acute ST Changes, no STD/SYED  Nonspecific st changes       0041 Outpatient notes reviewed, last PCP visit 5/16/2023, at that time was complaining of vomiting and constipation, started on bowel regiment and referred to gastroenterology   [phone] Patient with low normal oxygen saturations even while at rest, elevated white blood cell count, likely left-sided infiltrate with history consistent with aspiration, will start on broad-spectrum antibiotics, admit for further management                            Initial Sepsis Screening     [address] Name 06/23/23 0209                Is the patient's history suggestive of a new or worsening infection?  Yes (Proceed)  -EB        Suspected source of infection pneumonia  -EB        Indicate SIRS criteria Tachycardia > 90 bpm;Leukocytosis (WBC > 57483 IJL) OR Leukopenia (WBC <4000 IJL) OR Bandemia (WBC >10% bands)  -EB        Are two or more of the above signs & symptoms of infection both present and new to the patient? No  -EB              User Key  (r) = Recorded By, (t) = Taken By, (c) = Cosigned By    [address] Name Provider Type    DILMA Mandujano,  Physician                SBIRT 22yo+    Flowsheet Row Most Recent Value   Initial Alcohol Screen: US AUDIT-C     1  How often do you have a drink containing alcohol? 0 Filed at: 06/23/2023 0024   2  How many drinks containing alcohol do you have on a typical day you are drinking? 0 Filed at: 06/23/2023 0024   3a  Male UNDER 65: How often do you have five or more drinks on one occasion? 0 Filed at: 06/23/2023 0024   3b  FEMALE Any Age, or MALE 65+: How often do you have 4 or more drinks on one occassion? 0 Filed at: 06/23/2023 0024   Audit-C Score 0 Filed at: 06/23/2023 6880   RUBEN: How many times in the past year have you    Used an illegal drug or used a prescription medication for non-medical reasons? Never Filed at: 06/23/2023 0024                    Medical Decision Making  58-year-old male with intellectual disability, history provided primarily by family member who is in the room presents for evaluation of cough, fever, hypoxia, currently in no acute respiratory distress however concern for pneumonia aspiration versus community-acquired, bronchitis, esophagitis, low suspicion for bronchospasm as patient has a normal lung exam with no wheezing, she is mildly tachycardic likely in setting of low-grade fever, will obtain EKG to evaluate for ACS, screen for ischemic changes        Amount and/or Complexity of Data Reviewed  Labs: ordered  Radiology: ordered            Disposition  Final diagnoses:   Pneumonia   Sepsis ([facility] [address] )   Acute respiratory failure with hypoxemia ([facility] [address] )     Time reflects when diagnosis was documented in both MDM as applicable and the Disposition within this note     Time User Action Codes Description Comment    6/23/2023  2:08 AM Orvel Tuan Add [J18 9] Pneumonia     6/23/2023  2:08 AM Orvel Tuan Add [R06 89] Acute respiratory insufficiency     6/23/2023  2:09 AM Orvel Tuan Add [A41 9] Sepsis ([address] )     6/23/2023  2:53 AM Orvel Tuan Remove [R06 89] Acute respiratory insufficiency     6/23/2023  2:54 AM Orvel Tuan Add [J96 01] Acute respiratory failure with hypoxemia [facility])       ED Disposition     ED Disposition   Admit    Condition   Stable    Date/Time   Fri Jun 23, 2023  2:09 AM    Comment   Case was discussed with SHEREE and the patient's admission status was agreed to be Admission Status: inpatient status to the service of Dr Lange Borne   Follow-up Information    None         Patient's Medications   Discharge Prescriptions    No medications on file       No discharge procedures on file      PDMP Review     None          ED Provider  Electronically Signed by           Dev Colindres DO  06/23/23 3409

## 2023-06-23 NOTE — ASSESSMENT & PLAN NOTE
· Currently living with his sister with plans to move into a group home once available  · CM consulted to possibly assist with earlier placement

## 2023-06-23 NOTE — ASSESSMENT & PLAN NOTE
· Sister reports progressive worsening of dysphagia with upcoming speech therapy appointment  · Episode of choking on 6/18  · Strict n p o  until seen by speech  · Speech consult

## 2023-06-23 NOTE — ASSESSMENT & PLAN NOTE
· Home regimen: Omeprazole 40 Mg daily and Carafate 1 g twice daily  · Currently on hold in setting of strict n p o  due to dysphagia initial  screen

## 2023-06-23 NOTE — H&P
[place]  H&P  Name: Maralyn Schlatter 65 y o  male I MRN: [id number]  Unit/Bed#: ED 04 I Date of Admission: 6/23/2023   Date of Service: 6/23/2023 I Hospital Day: 0      Assessment/Plan   * Aspiration pneumonia [facility])  Assessment & Plan  · Episode of choking on 6/18 with development of cough 6/21 with worsening 6/22 and development of fever and fatigue  · Chest x-ray without clear pneumonia (? LLL opacity), however difficult to interpret secondary to kyphoscoliosis  · Given cefepime and vancomycin in the ED -de-escalate to ceftriaxone as patient is from home  · Treat as if aspiration pneumonia for now, however if leukocytosis fails to improve or procalcitonin rises would obtain CT C/A/P  · Sputum culture and Gram stain  · Urine strep and Legionella studies  · Respiratory protocol  · Aspiration precautions  · Airway clearance protocol    Dysphagia  Assessment & Plan  · Sister reports progressive worsening of dysphagia with upcoming speech therapy appointment  · Episode of choking on 6/18  · Strict n p o  until seen by speech  · Speech consult    Sepsis without acute organ dysfunction ([facility] [address] )  Assessment & Plan  · SIRS criteria met on admission: Tachycardia and leukocytosis  · Suspected sources possible aspiration pneumonia based on clinical symptoms, however chest x-ray is lacking for focal consolidation on unofficial review  · No severe sepsis criteria met  · Continue ceftriaxone for now  · If leukocytosis fails to improve or procalcitonin continues to rise, would obtain CT C/A/P  · UA still pending    GERD (gastroesophageal reflux disease)  Assessment & Plan  · Home regimen: Omeprazole 40 Mg daily and Carafate 1 g twice daily  · Currently on hold in setting of strict n p o  due to dysphagia    Acute respiratory insufficiency  Assessment & Plan  · SPO2 on room air 87 to 88%  · SPO2 stable on 2 L supplemental oxygen via nasal cannula  · Wean oxygen as able  · May need home oxygen evaluation "prior to discharge if unable to wean off oxygen    Primary hypertension  Assessment & Plan  · Home regimen: metoprolol 25 Mg daily  · Hold while strict n p o  until cleared by speech    Intellectual disability  Assessment & Plan  · Currently living with his sister with plans to move into a group home once available  · CM consulted to possibly assist with earlier placement    Depression  Assessment & Plan  · Home regimen: Wellbutrin 100 Mg twice daily  · Currently on hold while strict n p o , will need to be reordered once cleared by speech       VTE Pharmacologic Prophylaxis: VTE Score: 4 Moderate Risk (Score 3-4) - Pharmacological DVT Prophylaxis Ordered: heparin  Code Status: Level 1 - Full Code   Discussion with family: Updated  (sister) at bedside  Anticipated Length of Stay: Patient will be admitted on an inpatient basis with an anticipated length of stay of greater than 2 midnights secondary to Aspiration pneumonia, sepsis, dysphagia  Total Time Spent on Date of Encounter in care of patient: 75 minutes This time was spent on one or more of the following: performing physical exam; counseling and coordination of care; obtaining or reviewing history; documenting in the medical record; reviewing/ordering tests, medications or procedures; communicating with other healthcare professionals and discussing with patient's family/caregivers  Chief Complaint: Per sister \" he developed a fever and fatigue with a low pulse ox last night\"    History of Present Illness:  Duane Hassan is a 65 y o  male with a PMH of intellectual disability, HTN, GERD, and progressively worsening dysphagia who presents with his sister for evaluation of fever with Tmax of 101 °F that onset Thursday evening  Sister reports patient started with a light cough that was nonproductive on Wednesday that worsened throughout the day on Thursday    He did have 1 episode of emesis that was sputum while at adult day program   Patient " "also with significant fatigue Thursday evening  Sister checked his home pulse ox and it was 85%, prompting ED visit  Sister reports an episode of choking while out to eat on Sunday  Patient is to see speech therapy next week for further evaluation  No other acute issues  Review of Systems:  Review of Systems   Reason unable to perform ROS: Limited secondary to patient's intellectual disability  Constitutional: Positive for fatigue and fever  Respiratory: Positive for cough  Gastrointestinal: Positive for vomiting  Genitourinary: Positive for decreased urine volume  Neurological: Negative for weakness and numbness  All other systems reviewed and are negative  Past Medical and Surgical History:   Past Medical History:   Diagnosis Date   • Depression    • GERD (gastroesophageal reflux disease)    • Intellectual disability    • Scoliosis, unspecified scoliosis type, unspecified spinal region        Past Surgical History:   Procedure Laterality Date   • APPENDECTOMY     • CHOLECYSTECTOMY     • HERNIA REPAIR     • PANCREAS SURGERY N/A     Removal of a mass perhaps 2018   • PROSTATE SURGERY N/A     \"Laser prostate surgery \"       Meds/Allergies:  Prior to Admission medications    Medication Sig Start Date End Date Taking? Authorizing Provider   buPROPion (WELLBUTRIN) 100 mg tablet TAKE 2 TABLETS BY MOUTH 2 TIMES A DAY  Patient taking differently: Take 100 mg by mouth 2 (two) times a day 5/24/23  Yes BRIANNE Marie   loratadine (CLARITIN) 10 mg tablet Take 1 tablet (10 mg total) by mouth daily as needed for allergies 2/3/23  Yes Lila Lawrence DO   metoprolol succinate (TOPROL-XL) 25 mg 24 hr tablet TAKE 1 TABLET (25 MG TOTAL) BY MOUTH DAILY  5/24/23  Yes BRIANNE Marie   omeprazole (PriLOSEC) 40 MG capsule TAKE 1 CAPSULE (40 MG TOTAL) BY MOUTH DAILY   5/24/23  Yes BRIANNE Marie   simvastatin (ZOCOR) 40 mg tablet TAKE 1 TABLET BY MOUTH DAILY AT BEDTIME 5/25/23  Yes Lila Lawrence, " DO   sucralfate (CARAFATE) 1 g tablet Take 1 tablet (1 g total) by mouth 2 (two) times a day 2/3/23  Yes Alicia Jordan, DO   acetaminophen (TYLENOL) 325 mg tablet Take 2 tablets (650 mg total) by mouth every 4 (four) hours as needed for moderate pain or fever 2/3/23   Alicia Jordan, DO   ketoconazole (NIZORAL) 2 % shampoo Apply 1 application topically in the morning 2/3/23   Alicia Jordan, DO   olopatadine (PATANOL) 0 1 % ophthalmic solution Administer 1 drop to both eyes 2 (two) times a day as needed for allergies 2/3/23   Alicia Jordan, DO   oxybutynin (DITROPAN-XL) 10 MG 24 hr tablet Take 10 mg by mouth daily 1/31/23 6/23/23  Historical Provider, MD MIKE have reviewed home medications with patient family member  Allergies: Allergies   Allergen Reactions   • Levofloxacin Itching   • Codeine Nausea Only     Sister is unsure of actual reaction         Social History:  Marital Status: Single   Occupation: Disabled, goes to adult day program  Patient Pre-hospital Living Situation: Home, With other family member: Sister  Patient Pre-hospital Level of Mobility: walks  Patient Pre-hospital Diet Restrictions: None  Substance Use History:   Social History     Substance and Sexual Activity   Alcohol Use Not Currently     Social History     Tobacco Use   Smoking Status Never   • Passive exposure: Never   Smokeless Tobacco Never     Social History     Substance and Sexual Activity   Drug Use Never       Family History:  Family History   Problem Relation Age of Onset   • Heart disease Mother    • Parkinsonism Mother    • Heart disease Father    • Alcohol abuse Neg Hx    • Substance Abuse Neg Hx    • Mental illness Neg Hx        Physical Exam:     Vitals:   Blood Pressure: 113/77 (06/23/23 0200)  Pulse: 102 (06/23/23 0200)  Temperature: 99 °F (37 2 °C) (06/23/23 0022)  Temp Source: Oral (06/23/23 0022)  Respirations: 16 (06/23/23 0200)  Weight - Scale: 46 kg (101 lb 6 6 oz) (06/23/23 0022)  SpO2: 92 % (06/23/23 0200)    Physical Exam  Vitals and nursing note reviewed  Constitutional:       Appearance: Normal appearance  Comments: Pleasant and conversational   Cooperative  HENT:      Head: Normocephalic  Nose: Nose normal       Mouth/Throat:      Mouth: Mucous membranes are dry  Eyes:      Extraocular Movements: Extraocular movements intact  Conjunctiva/sclera: Conjunctivae normal    Cardiovascular:      Rate and Rhythm: Normal rate and regular rhythm  Pulses: Normal pulses  Heart sounds: No murmur heard  Pulmonary:      Effort: Pulmonary effort is normal  No respiratory distress  Breath sounds: No wheezing, rhonchi or rales  Comments: No conversational dyspnea  On 2 L nasal cannula with SPO2 92%  Wet sounding cough, however difficulty bringing up sputum  Abdominal:      General: Abdomen is flat  Palpations: Abdomen is soft  Tenderness: There is no abdominal tenderness  There is no guarding or rebound  Musculoskeletal:         General: Normal range of motion  Cervical back: Normal range of motion  Right lower leg: No edema  Left lower leg: No edema  Comments: Severe kyphoscoliosis   Skin:     General: Skin is warm and dry  Neurological:      Mental Status: He is alert  Comments: At baseline mentation per sister at bedside   Psychiatric:         Mood and Affect: Mood normal          Thought Content:  Thought content normal           Additional Data:     Lab Results:  Results from last 7 days   Lab Units 06/23/23  0059   WBC Thousand/uL 25 39*   HEMOGLOBIN g/dL 15 5   HEMATOCRIT % 46 4   PLATELETS Thousands/uL 191   NEUTROS PCT % 87*   LYMPHS PCT % 4*   MONOS PCT % 8   EOS PCT % 0     Results from last 7 days   Lab Units 06/23/23  0059   SODIUM mmol/L 135   POTASSIUM mmol/L 4 7   CHLORIDE mmol/L 104   CO2 mmol/L 23   BUN mg/dL 19   CREATININE mg/dL 1 06   ANION GAP mmol/L 8   CALCIUM mg/dL 9 3   ALBUMIN g/dL 4 3   TOTAL BILIRUBIN mg/dL 0 88   ALK PHOS U/L 115* ALT U/L 36   AST U/L 45*   GLUCOSE RANDOM mg/dL 134     Results from last 7 days   Lab Units 06/23/23  0059   INR  0 98             Results from last 7 days   Lab Units 06/23/23  0059   LACTIC ACID mmol/L 1 5   PROCALCITONIN ng/ml 0 45*       Lines/Drains:  Invasive Devices     Peripheral Intravenous Line  Duration           Peripheral IV 06/23/23 Dorsal (posterior); Right Wrist <1 day                    Imaging: Personally reviewed the following imaging: Chest x-ray without clear consolidation, however possible left lower lobe pneumonia  XR chest portable   ED Interpretation by Pat Roman DO (06/23 5520)   Left lower lobe opacity, concerning for pneumonia          EKG and Other Studies Reviewed on Admission:   · EKG: NSR 97 bpm   No acute ischemia  Normal QT interval     ** Please Note: This note has been constructed using a voice recognition system   **

## 2023-06-24 LAB
ANION GAP SERPL CALCULATED.3IONS-SCNC: 5 MMOL/L
BASOPHILS # BLD AUTO: 0.06 THOUSANDS/ÂΜL (ref 0–0.1)
BASOPHILS NFR BLD AUTO: 0 % (ref 0–1)
BUN SERPL-MCNC: 13 MG/DL (ref 5–25)
CALCIUM SERPL-MCNC: 8 MG/DL (ref 8.4–10.2)
CHLORIDE SERPL-SCNC: 106 MMOL/L (ref 96–108)
CO2 SERPL-SCNC: 25 MMOL/L (ref 21–32)
CREAT SERPL-MCNC: 0.72 MG/DL (ref 0.6–1.3)
EOSINOPHIL # BLD AUTO: 0.48 THOUSAND/ÂΜL (ref 0–0.61)
EOSINOPHIL NFR BLD AUTO: 3 % (ref 0–6)
ERYTHROCYTE [DISTWIDTH] IN BLOOD BY AUTOMATED COUNT: 12.8 % (ref 11.6–15.1)
GFR SERPL CREATININE-BSD FRML MDRD: 100 ML/MIN/1.73SQ M
GLUCOSE SERPL-MCNC: 96 MG/DL (ref 65–140)
HCT VFR BLD AUTO: 40.1 % (ref 36.5–49.3)
HGB BLD-MCNC: 13.1 G/DL (ref 12–17)
IMM GRANULOCYTES # BLD AUTO: 0.08 THOUSAND/UL (ref 0–0.2)
IMM GRANULOCYTES NFR BLD AUTO: 1 % (ref 0–2)
LYMPHOCYTES # BLD AUTO: 1.11 THOUSANDS/ÂΜL (ref 0.6–4.47)
LYMPHOCYTES NFR BLD AUTO: 7 % (ref 14–44)
MAGNESIUM SERPL-MCNC: 2.2 MG/DL (ref 1.9–2.7)
MCH RBC QN AUTO: 30.4 PG (ref 26.8–34.3)
MCHC RBC AUTO-ENTMCNC: 32.7 G/DL (ref 31.4–37.4)
MCV RBC AUTO: 93 FL (ref 82–98)
MONOCYTES # BLD AUTO: 1.38 THOUSAND/ÂΜL (ref 0.17–1.22)
MONOCYTES NFR BLD AUTO: 9 % (ref 4–12)
NEUTROPHILS # BLD AUTO: 12.43 THOUSANDS/ÂΜL (ref 1.85–7.62)
NEUTS SEG NFR BLD AUTO: 80 % (ref 43–75)
NRBC BLD AUTO-RTO: 0 /100 WBCS
PLATELET # BLD AUTO: 164 THOUSANDS/UL (ref 149–390)
PMV BLD AUTO: 10 FL (ref 8.9–12.7)
POTASSIUM SERPL-SCNC: 3.4 MMOL/L (ref 3.5–5.3)
PROCALCITONIN SERPL-MCNC: 0.48 NG/ML
RBC # BLD AUTO: 4.31 MILLION/UL (ref 3.88–5.62)
SODIUM SERPL-SCNC: 136 MMOL/L (ref 135–147)
WBC # BLD AUTO: 15.54 THOUSAND/UL (ref 4.31–10.16)

## 2023-06-24 PROCEDURE — 94760 N-INVAS EAR/PLS OXIMETRY 1: CPT

## 2023-06-24 PROCEDURE — 94668 MNPJ CHEST WALL SBSQ: CPT

## 2023-06-24 PROCEDURE — 85025 COMPLETE CBC W/AUTO DIFF WBC: CPT | Performed by: INTERNAL MEDICINE

## 2023-06-24 PROCEDURE — 84145 PROCALCITONIN (PCT): CPT | Performed by: INTERNAL MEDICINE

## 2023-06-24 PROCEDURE — 83735 ASSAY OF MAGNESIUM: CPT | Performed by: INTERNAL MEDICINE

## 2023-06-24 PROCEDURE — 94669 MECHANICAL CHEST WALL OSCILL: CPT

## 2023-06-24 PROCEDURE — 99232 SBSQ HOSP IP/OBS MODERATE 35: CPT | Performed by: INTERNAL MEDICINE

## 2023-06-24 PROCEDURE — 94640 AIRWAY INHALATION TREATMENT: CPT

## 2023-06-24 PROCEDURE — 80048 BASIC METABOLIC PNL TOTAL CA: CPT | Performed by: INTERNAL MEDICINE

## 2023-06-24 RX ORDER — BENZONATATE 100 MG/1
100 CAPSULE ORAL 3 TIMES DAILY
Status: DISCONTINUED | OUTPATIENT
Start: 2023-06-24 | End: 2023-06-27 | Stop reason: HOSPADM

## 2023-06-24 RX ORDER — POTASSIUM CHLORIDE 20 MEQ/1
40 TABLET, EXTENDED RELEASE ORAL ONCE
Status: COMPLETED | OUTPATIENT
Start: 2023-06-24 | End: 2023-06-24

## 2023-06-24 RX ADMIN — METRONIDAZOLE 500 MG: 5 INJECTION, SOLUTION INTRAVENOUS at 12:03

## 2023-06-24 RX ADMIN — ONDANSETRON 4 MG: 2 INJECTION INTRAMUSCULAR; INTRAVENOUS at 14:15

## 2023-06-24 RX ADMIN — METRONIDAZOLE 500 MG: 5 INJECTION, SOLUTION INTRAVENOUS at 19:33

## 2023-06-24 RX ADMIN — BENZONATATE 100 MG: 100 CAPSULE ORAL at 18:07

## 2023-06-24 RX ADMIN — CEFTRIAXONE 1000 MG: 1 INJECTION, SOLUTION INTRAVENOUS at 13:15

## 2023-06-24 RX ADMIN — BUPROPION HYDROCHLORIDE 100 MG: 100 TABLET, FILM COATED ORAL at 09:58

## 2023-06-24 RX ADMIN — POTASSIUM CHLORIDE 40 MEQ: 1500 TABLET, EXTENDED RELEASE ORAL at 13:30

## 2023-06-24 RX ADMIN — PANTOPRAZOLE SODIUM 40 MG: 40 TABLET, DELAYED RELEASE ORAL at 05:16

## 2023-06-24 RX ADMIN — METOPROLOL SUCCINATE 25 MG: 25 TABLET, EXTENDED RELEASE ORAL at 09:58

## 2023-06-24 RX ADMIN — METRONIDAZOLE 500 MG: 5 INJECTION, SOLUTION INTRAVENOUS at 03:54

## 2023-06-24 RX ADMIN — ALBUTEROL SULFATE 2.5 MG: 2.5 SOLUTION RESPIRATORY (INHALATION) at 10:52

## 2023-06-24 RX ADMIN — PRAVASTATIN SODIUM 80 MG: 80 TABLET ORAL at 18:06

## 2023-06-24 RX ADMIN — HEPARIN SODIUM 5000 UNITS: 5000 INJECTION INTRAVENOUS; SUBCUTANEOUS at 05:16

## 2023-06-24 RX ADMIN — HEPARIN SODIUM 5000 UNITS: 5000 INJECTION INTRAVENOUS; SUBCUTANEOUS at 13:30

## 2023-06-24 RX ADMIN — BENZONATATE 100 MG: 100 CAPSULE ORAL at 21:25

## 2023-06-24 RX ADMIN — HEPARIN SODIUM 5000 UNITS: 5000 INJECTION INTRAVENOUS; SUBCUTANEOUS at 21:25

## 2023-06-24 NOTE — ASSESSMENT & PLAN NOTE
· SIRS criteria met on admission: Tachycardia and leukocytosis  · Suspected sources possible aspiration pneumonia based on clinical symptoms, however chest x-ray is lacking for focal consolidation on unofficial review  · No severe sepsis criteria met  · Continue ceftriaxone for now  · Leukocytosis trending down  · UA is negative  · Follow-up culture results

## 2023-06-24 NOTE — ASSESSMENT & PLAN NOTE
· Episode of choking on 6/18 with development of cough 6/21 with worsening 6/22 and development of fever and fatigue  · Chest x-ray without clear pneumonia (? LLL opacity), however difficult to interpret secondary to kyphoscoliosis  · Given cefepime and vancomycin in the ED -de-escalate to ceftriaxone as patient is from home  · Sputum culture and Gram stain  · Urine strep and Legionella studies is negative  · Follow-up blood culture results  · Respiratory protocol  · Aspiration precautions  · Airway clearance protocol  · Trend WBC and fever curve

## 2023-06-24 NOTE — PROGRESS NOTES
[place]  Progress Note  Name: Jocelynn Contreras  MRN: [id number]  Unit/Bed#: -01 I Date of Admission: 6/23/2023   Date of Service: 6/24/2023 I Hospital Day: 1    Assessment/Plan   Acute respiratory insufficiency  Assessment & Plan  · SPO2 on room air 87 to 88%  · SPO2 stable on 2 L supplemental oxygen via nasal cannula  · Wean oxygen as able  · May need home oxygen evaluation prior to discharge if unable to wean off oxygen    [facility]  Assessment & Plan  · SIRS criteria met on admission: Tachycardia and leukocytosis  · Suspected sources possible aspiration pneumonia based on clinical symptoms, however chest x-ray is lacking for focal consolidation on unofficial review  · No severe sepsis criteria met  · Continue ceftriaxone for now  · Leukocytosis trending down  · UA is negative  · Follow-up culture results    Primary hypertension  Assessment & Plan  · Home regimen: metoprolol 25 Mg daily  · Continue Toprol-XL    Depression  Assessment & Plan  · Home regimen: Wellbutrin 100 Mg twice daily  · Continue home medication    Dysphagia  Assessment & Plan  · Sister reports progressive worsening of dysphagia with upcoming speech therapy appointment  · Episode of choking on 6/18  · Speech and swallow evaluation done    Patient underwent video barium swallow and was started on dysphagia 3 with thin liquid    Intellectual disability  Assessment & Plan  · Currently living with his sister with plans to move into a group home once available  · CM consulted to possibly assist with earlier placement    GERD (gastroesophageal reflux disease)  Assessment & Plan  · Home regimen: Omeprazole 40 Mg daily and Carafate 1 g twice daily  · Continue home medication    * Aspiration pneumonia ([facility] [address] )  Assessment & Plan  · Episode of choking on 6/18 with development of cough 6/21 with worsening 6/22 and development of fever and fatigue  · Chest x-ray without clear pneumonia (? "LLL opacity), however difficult to interpret secondary to kyphoscoliosis  · Given cefepime and vancomycin in the ED -de-escalate to ceftriaxone as patient is from home  · Sputum culture and Gram stain  · Urine strep and Legionella studies is negative  · Follow-up blood culture results  · Respiratory protocol  · Aspiration precautions  · Airway clearance protocol  · Trend WBC and fever curve           Labs & Imaging: I have personally reviewed pertinent reports  VTE Prophylaxis: in place  Code Status:   Level 1 - Full Code    Patient Centered Rounds: I have performed bedside rounds with nursing staff today  Discussions with Specialists or Other Care Team Provider: CM    Education and Discussions with Family / Patient: Sister at bedside    Current Length of Stay: 1 day(s)    Current Patient Status: Inpatient   Certification Statement: The patient will continue to require additional inpatient hospital stay due to see my assessment and plan  Subjective:   Patient is seen and examined at bedside  No new complaints  Afebrile has some nausea  All other ROS are negative  Objective:    Vitals: Blood pressure 115/72, pulse 99, temperature 97 9 °F (36 6 °C), temperature source Axillary, resp  rate 16, height 4' 11\" (1 499 m), weight 46 2 kg (101 lb 12 8 oz), SpO2 94 %  ,Body mass index is 20 56 kg/m²  SPO2 RA Rest    Flowsheet Row ED to Hosp-Admission (Current) from 6/23/2023 in [address] Med Surg Unit   SpO2 94 %   SpO2 Activity At Rest   O2 Device None (Room air)   O2 Flow Rate --        I&O:     Intake/Output Summary (Last 24 hours) at 6/24/2023 1357  Last data filed at 6/24/2023 0901  Gross per 24 hour   Intake 250 ml   Output 1 ml   Net 249 ml       Physical Exam:    General- Alert, lying comfortably in bed  Not in any acute distress    Neck- Supple, No JVD  CVS- regular, S1 and S2 normal  Chest- Bilateral Air entry, decreased at bases  Abdomen- soft, nontender, not distended, no " guarding or rigidity, BS+  Extremities-  No pedal edema, No calf tenderness  CNS-   Alert, awake  At baseline    Invasive Devices     Peripheral Intravenous Line  Duration           Peripheral IV 06/23/23 Dorsal (posterior); Right Wrist 1 day                      Social History  reviewed  Family History   Problem Relation Age of Onset   • Heart disease Mother    • Parkinsonism Mother    • Heart disease Father    • Alcohol abuse Neg Hx    • Substance Abuse Neg Hx    • Mental illness Neg Hx     reviewed    Meds:  Current Facility-Administered Medications   Medication Dose Route Frequency Provider Last Rate Last Admin   • albuterol inhalation solution 2 5 mg  2 5 mg Nebulization Q6H PRN Jesus Booth MD   2 5 mg at 06/24/23 1052   • buPROPion (WELLBUTRIN) tablet 100 mg  100 mg Oral Daily Chrissy Russo PA-C   100 mg at 06/24/23 1115   • cefTRIAXone (ROCEPHIN) IVPB (premix in dextrose) 1,000 mg 50 mL  1,000 mg Intravenous Q24H Juancarlos Silva PA-C 100 mL/hr at 06/24/23 1315 1,000 mg at 06/24/23 1315   • heparin (porcine) subcutaneous injection 5,000 Units  5,000 Units Subcutaneous Q8H 87 Christa Nunez PA-C   5,000 Units at 06/24/23 1330   • metoprolol succinate (TOPROL-XL) 24 hr tablet 25 mg  25 mg Oral Daily Chrissy Russo PA-C   25 mg at 06/24/23 2236   • metroNIDAZOLE (FLAGYL) IVPB (premix) 500 mg 100 mL  500 mg Intravenous Q8H Jesus Booth  mL/hr at 06/24/23 1203 500 mg at 06/24/23 1203   • ondansetron (ZOFRAN) injection 4 mg  4 mg Intravenous Q6H PRN Gloria Randhawa PA-C   4 mg at 06/23/23 1756   • pantoprazole (PROTONIX) EC tablet 40 mg  40 mg Oral Early Morning Laureano Pickard PA-C   40 mg at 06/24/23 0516   • polyethylene glycol (MIRALAX) packet 17 g  17 g Oral Daily Laureano Pickard PA-C       • pravastatin (PRAVACHOL) tablet 80 mg  80 mg Oral Daily With Dinner Laureano Pickard PA-C          Medications Prior to Admission   Medication   • acetaminophen (TYLENOL) 325 mg tablet   • buPROPion "(WELLBUTRIN) 100 mg tablet   • ketoconazole (NIZORAL) 2 % shampoo   • loratadine (CLARITIN) 10 mg tablet   • metoprolol succinate (TOPROL-XL) 25 mg 24 hr tablet   • omeprazole (PriLOSEC) 40 MG capsule   • polyethylene glycol (MIRALAX) 17 g packet   • simvastatin (ZOCOR) 40 mg tablet   • sucralfate (CARAFATE) 1 g tablet   • olopatadine (PATANOL) 0 1 % ophthalmic solution       Labs:  Results from last 7 days   Lab Units 06/24/23  0422 06/23/23  0556 06/23/23  0059   WBC Thousand/uL 15 54* 23 67* 25 39*   HEMOGLOBIN g/dL 13 1 14 0 15 5   HEMATOCRIT % 40 1 41 7 46 4   PLATELETS Thousands/uL 164 180 191   NEUTROS PCT % 80*  --  87*   LYMPHS PCT % 7*  --  4*   MONOS PCT % 9  --  8   EOS PCT % 3  --  0     Results from last 7 days   Lab Units 06/24/23  0417 06/23/23  0556 06/23/23  0059   POTASSIUM mmol/L 3 4* 3 6 4 7   CHLORIDE mmol/L 106 105 104   CO2 mmol/L 25 24 23   BUN mg/dL 13 21 19   CREATININE mg/dL 0 72 0 90 1 06   CALCIUM mg/dL 8 0* 8 8 9 3   ALK PHOS U/L  --   --  115*   ALT U/L  --   --  36   AST U/L  --   --  45*     No results found for: \"TROPONINI\", \"CKMB\", \"CKTOTAL\"  Results from last 7 days   Lab Units 06/23/23  0059   INR  0 98     Lab Results   Component Value Date    BLOODCX No Growth at 24 hrs  06/23/2023    BLOODCX No Growth at 24 hrs  06/23/2023         Imaging:  Results for orders placed during the hospital encounter of 06/23/23    XR chest portable    Narrative  CHEST    INDICATION:   cough, fever  COMPARISON:  None    EXAM PERFORMED/VIEWS:  XR CHEST PORTABLE      FINDINGS:    Cardiomediastinal silhouette appears unremarkable  The lungs are clear  No pneumothorax or pleural effusion  Marked upper thoracic scoliosis, convex to the right  Imaged bones otherwise unremarkable  Impression  No acute disease in the chest       Workstation performed: [id number]    No results found for this or any previous visit        Last 24 Hours Medication List:   Current Facility-Administered " Medications   Medication Dose Route Frequency Provider Last Rate   • albuterol  2 5 mg Nebulization Q6H PRN Jesus Booth MD     • buPROPion  100 mg Oral Daily Laureano Pickard PA-C     • cefTRIAXone  1,000 mg Intravenous Q24H Juancarlos Silva PA-C 1,000 mg (06/24/23 1315)   • heparin (porcine)  5,000 Units Subcutaneous Q8H [address] Junacarlos Silva PA-C     • metoprolol succinate  25 mg Oral Daily Laureano Pickard PA-C     • metroNIDAZOLE  500 mg Intravenous Q8H Jesus Booth  mg (06/24/23 1203)   • ondansetron  4 mg Intravenous Q6H PRN Gloria Randhawa PA-C     • pantoprazole  40 mg Oral Early Morning Chrissy Russo PA-C     • polyethylene glycol  17 g Oral Daily Chrissy Russo PA-C     • pravastatin  80 mg Oral Daily With Rick Mccollum PA-C          Today, Patient Was Seen By: Jesus Booth MD    ** Please Note: Dictation voice to text software may have been used in the creation of this document   **

## 2023-06-24 NOTE — PLAN OF CARE
Problem: Potential for Falls  Goal: Patient will remain free of falls  Description: INTERVENTIONS:  - Educate patient/family on patient safety including physical limitations  - Instruct patient to call for assistance with activity   - Consult OT/PT to assist with strengthening/mobility   - Keep Call bell within reach  - Keep bed low and locked with side rails adjusted as appropriate  - Keep care items and personal belongings within reach  - Initiate and maintain comfort rounds  - Make Fall Risk Sign visible to staff  - Offer Toileting every 2 Hours, in advance of need  - Initiate/Maintain alarm  - Obtain necessary fall risk management equipment:   - Apply yellow socks and bracelet for high fall risk patients  - Consider moving patient to room near nurses station  6/24/2023 0016 by Robb Garcia, RN  Outcome: Progressing  6/24/2023 0016 by Robb Garcia RN  Outcome: Progressing     Problem: Nutrition/Hydration-ADULT  Goal: Nutrient/Hydration intake appropriate for improving, restoring or maintaining nutritional needs  Description: Monitor and assess patient's nutrition/hydration status for malnutrition  Collaborate with interdisciplinary team and initiate plan and interventions as ordered  Monitor patient's weight and dietary intake as ordered or per policy  Utilize nutrition screening tool and intervene as necessary  Determine patient's food preferences and provide high-protein, high-caloric foods as appropriate       INTERVENTIONS:  - Monitor oral intake, urinary output, labs, and treatment plans  - Assess nutrition and hydration status and recommend course of action  - Evaluate amount of meals eaten  - Assist patient with eating if necessary   - Allow adequate time for meals  - Recommend/ encourage appropriate diets, oral nutritional supplements, and vitamin/mineral supplements  - Order, calculate, and assess calorie counts as needed  - Recommend, monitor, and adjust tube feedings and TPN/PPN based on assessed needs  - Assess need for intravenous fluids  - Provide specific nutrition/hydration education as appropriate  - Include patient/family/caregiver in decisions related to nutrition  6/24/2023 0016 by Shauna Mariee RN  Outcome: Progressing  6/24/2023 0016 by Shauna Mariee RN  Outcome: Progressing     Problem: Prexisting or High Potential for Compromised Skin Integrity  Goal: Skin integrity is maintained or improved  Description: INTERVENTIONS:  - Identify patients at risk for skin breakdown  - Assess and monitor skin integrity  - Assess and monitor nutrition and hydration status  - Monitor labs   - Assess for incontinence   - Turn and reposition patient  - Assist with mobility/ambulation  - Relieve pressure over bony prominences  - Avoid friction and shearing  - Provide appropriate hygiene as needed including keeping skin clean and dry  - Evaluate need for skin moisturizer/barrier cream  - Collaborate with interdisciplinary team   - Patient/family teaching  - Consider wound care consult   6/24/2023 0016 by Shauna Mariee RN  Outcome: Progressing  6/24/2023 0016 by Shauna Mariee RN  Outcome: Progressing     Problem: MOBILITY - ADULT  Goal: Maintain or return to baseline ADL function  Description: INTERVENTIONS:  -  Assess patient's ability to carry out ADLs; assess patient's baseline for ADL function and identify physical deficits which impact ability to perform ADLs (bathing, care of mouth/teeth, toileting, grooming, dressing, etc )  - Assess/evaluate cause of self-care deficits   - Assess range of motion  - Assess patient's mobility; develop plan if impaired  - Assess patient's need for assistive devices and provide as appropriate  - Encourage maximum independence but intervene and supervise when necessary  - Involve family in performance of ADLs  - Assess for home care needs following discharge   - Consider OT consult to assist with ADL evaluation and planning for discharge  - Provide patient education as appropriate  6/24/2023 0016 by Zhao Coelho RN  Outcome: Progressing  6/24/2023 0016 by Zhao Coelho RN  Outcome: Progressing  Goal: Maintains/Returns to pre admission functional level  Description: INTERVENTIONS:  - Perform BMAT or MOVE assessment daily    - Set and communicate daily mobility goal to care team and patient/family/caregiver  - Collaborate with rehabilitation services on mobility goals if consulted  - Perform Range of Motion 2 times a day  - Reposition patient every 2 hours    - Dangle patient 2 times a day  - Stand patient 2 times a day  - Ambulate patient 2 times a day  - Out of bed to chair 2 times a day   - Out of bed for meals 2 times a day  - Out of bed for toileting  - Record patient progress and toleration of activity level   6/24/2023 0016 by Zhao Coelho RN  Outcome: Progressing  6/24/2023 0016 by Zhao Coelho RN  Outcome: Progressing     Problem: INFECTION - ADULT  Goal: Absence or prevention of progression during hospitalization  Description: INTERVENTIONS:  - Assess and monitor for signs and symptoms of infection  - Monitor lab/diagnostic results  - Monitor all insertion sites, i e  indwelling lines, tubes, and drains  - Monitor endotracheal if appropriate and nasal secretions for changes in amount and color  - [place] appropriate cooling/warming therapies per order  - Administer medications as ordered  - Instruct and encourage patient and family to use good hand hygiene technique  - Identify and instruct in appropriate isolation precautions for identified infection/condition  6/24/2023 0016 by Zhao Coelho RN  Outcome: Progressing  6/24/2023 0016 by Zhao Coelho RN  Outcome: Progressing  Goal: Absence of fever/infection during neutropenic period  Description: INTERVENTIONS:  - Monitor WBC    6/24/2023 0016 by Zhao Coelho RN  Outcome: Progressing  6/24/2023 0016 by Zhao Coelho RN  Outcome: Progressing

## 2023-06-24 NOTE — ASSESSMENT & PLAN NOTE
· Sister reports progressive worsening of dysphagia with upcoming speech therapy appointment  · Episode of choking on 6/18  · Speech and swallow evaluation done    Patient underwent video barium swallow and was started on dysphagia 3 with thin liquid

## 2023-06-24 NOTE — CASE MANAGEMENT
Case Management Discharge Planning Note    Patient name Rozina Daniel  Location /-64 MRN [id number]  : 1960 Date 2023       Current Admission Date: 2023  Current Admission Diagnosis:Aspiration pneumonia [facility])   Patient Active Problem List    Diagnosis Date Noted   • Dysphagia 2023   • Aspiration pneumonia ([address] ) 2023   • Depression 2023   • Primary hypertension 2023   • Sepsis without acute organ dysfunction ([address] ) 2023   • Acute respiratory insufficiency 2023   • Intellectual disability 2023   • Kevin Syndrome 2023   • Neuroendocrine carcinoma ([address] ) 2023   • Kyphoscoliosis and scoliosis 2023   • Recurrent major depressive disorder ([address] ) 10/04/2022   • Abnormal gait 2021   • Sensorineural hearing loss 2019   • Urinary incontinence 2018   • Anxiety 11/15/2016   • Allergic rhinitis 10/14/2016   • BPH (benign prostatic hyperplasia) 10/14/2016   • GERD (gastroesophageal reflux disease) 10/14/2016   • Mixed hyperlipidemia 10/14/2016   • Other psoriasis 10/14/2016   • Neuroendocrine tumor of pancreas 2014      LOS (days): 1  Geometric Mean LOS (GMLOS) (days): 5 00  Days to GMLOS:3 5     OBJECTIVE:  Risk of Unplanned Readmission Score: 11 23      Current admission status: Inpatient   Preferred Pharmacy:   [facility] [address] [place] [address]  Phone: [phone] Fax: [phone]    Primary Care Provider: Pushpa Mcgill DO    Primary Insurance: MEDICARE  Secondary Insurance:     DISCHARGE DETAILS:    Discharge planning discussed with[de-identified] pt and his ister at bedside  Freedom of Choice: Yes     CM contacted family/caregiver?: Yes  Were Treatment Team discharge recommendations reviewed with patient/caregiver?: Yes  Did patient/caregiver verbalize understanding of patient care needs?: Yes  Were patient/caregiver advised of the risks associated with not following Treatment Team discharge recommendations?: Yes    Contacts  Patient Contacts: Carmenza Castellano  Relationship to Patient[de-identified] Family  Contact Method: In Person  Reason/Outcome: Discharge [address]         Is the patient interested in [facility] at discharge?: Yes  [address] requested[de-identified] Occupational Therapy, Physical [address] Name[de-identified] [address] [facility] Provider[de-identified] PCP  Home Health Services Needed[de-identified] Evaluate Functional Status and Safety, Strengthening/Theraputic Exercises to Improve Function, Gait/ADL Training  Supporting Clincal Findings[de-identified] Limited Endurance, Fatigues Easliy in [employer], Cognitive Deficit Requiring the Assistance of Others    DME Referral Provided  Referral made for DME?: No    Other Referral/Resources/Interventions Provided:  Interventions: [facility]  Referral Comments: Referral to [facility] through [facility]  Treatment Team Recommendation: Home with [facility]  Discharge Destination Plan[de-identified] Home with Evelyn at Discharge : Family     Additional Comments: CM reviewed pt's sisters request for home care  Referral placed via [facility] for [facility]

## 2023-06-25 LAB
ANION GAP SERPL CALCULATED.3IONS-SCNC: 6 MMOL/L
BASOPHILS # BLD AUTO: 0.06 THOUSANDS/ÂΜL (ref 0–0.1)
BASOPHILS NFR BLD AUTO: 1 % (ref 0–1)
BUN SERPL-MCNC: 11 MG/DL (ref 5–25)
CALCIUM SERPL-MCNC: 8.6 MG/DL (ref 8.4–10.2)
CHLORIDE SERPL-SCNC: 107 MMOL/L (ref 96–108)
CO2 SERPL-SCNC: 27 MMOL/L (ref 21–32)
CREAT SERPL-MCNC: 0.74 MG/DL (ref 0.6–1.3)
EOSINOPHIL # BLD AUTO: 0.81 THOUSAND/ÂΜL (ref 0–0.61)
EOSINOPHIL NFR BLD AUTO: 8 % (ref 0–6)
ERYTHROCYTE [DISTWIDTH] IN BLOOD BY AUTOMATED COUNT: 13.1 % (ref 11.6–15.1)
GFR SERPL CREATININE-BSD FRML MDRD: 99 ML/MIN/1.73SQ M
GLUCOSE SERPL-MCNC: 100 MG/DL (ref 65–140)
HCT VFR BLD AUTO: 41.1 % (ref 36.5–49.3)
HGB BLD-MCNC: 13.2 G/DL (ref 12–17)
IMM GRANULOCYTES # BLD AUTO: 0.03 THOUSAND/UL (ref 0–0.2)
IMM GRANULOCYTES NFR BLD AUTO: 0 % (ref 0–2)
LYMPHOCYTES # BLD AUTO: 1.5 THOUSANDS/ÂΜL (ref 0.6–4.47)
LYMPHOCYTES NFR BLD AUTO: 14 % (ref 14–44)
MCH RBC QN AUTO: 30.6 PG (ref 26.8–34.3)
MCHC RBC AUTO-ENTMCNC: 32.1 G/DL (ref 31.4–37.4)
MCV RBC AUTO: 95 FL (ref 82–98)
MONOCYTES # BLD AUTO: 1.09 THOUSAND/ÂΜL (ref 0.17–1.22)
MONOCYTES NFR BLD AUTO: 10 % (ref 4–12)
NEUTROPHILS # BLD AUTO: 7.28 THOUSANDS/ÂΜL (ref 1.85–7.62)
NEUTS SEG NFR BLD AUTO: 67 % (ref 43–75)
NRBC BLD AUTO-RTO: 0 /100 WBCS
PLATELET # BLD AUTO: 182 THOUSANDS/UL (ref 149–390)
PMV BLD AUTO: 10.7 FL (ref 8.9–12.7)
POTASSIUM SERPL-SCNC: 3.9 MMOL/L (ref 3.5–5.3)
PROCALCITONIN SERPL-MCNC: 0.24 NG/ML
RBC # BLD AUTO: 4.31 MILLION/UL (ref 3.88–5.62)
SODIUM SERPL-SCNC: 140 MMOL/L (ref 135–147)
WBC # BLD AUTO: 10.77 THOUSAND/UL (ref 4.31–10.16)

## 2023-06-25 PROCEDURE — 80048 BASIC METABOLIC PNL TOTAL CA: CPT | Performed by: INTERNAL MEDICINE

## 2023-06-25 PROCEDURE — 99232 SBSQ HOSP IP/OBS MODERATE 35: CPT | Performed by: INTERNAL MEDICINE

## 2023-06-25 PROCEDURE — 84145 PROCALCITONIN (PCT): CPT | Performed by: INTERNAL MEDICINE

## 2023-06-25 PROCEDURE — 85025 COMPLETE CBC W/AUTO DIFF WBC: CPT | Performed by: INTERNAL MEDICINE

## 2023-06-25 RX ORDER — GUAIFENESIN 600 MG/1
600 TABLET, EXTENDED RELEASE ORAL 2 TIMES DAILY
Status: DISCONTINUED | OUTPATIENT
Start: 2023-06-25 | End: 2023-06-27 | Stop reason: HOSPADM

## 2023-06-25 RX ADMIN — METRONIDAZOLE 500 MG: 5 INJECTION, SOLUTION INTRAVENOUS at 04:00

## 2023-06-25 RX ADMIN — BENZONATATE 100 MG: 100 CAPSULE ORAL at 08:47

## 2023-06-25 RX ADMIN — METRONIDAZOLE 500 MG: 5 INJECTION, SOLUTION INTRAVENOUS at 12:30

## 2023-06-25 RX ADMIN — GUAIFENESIN 600 MG: 600 TABLET ORAL at 12:37

## 2023-06-25 RX ADMIN — PRAVASTATIN SODIUM 80 MG: 80 TABLET ORAL at 17:00

## 2023-06-25 RX ADMIN — BUPROPION HYDROCHLORIDE 100 MG: 100 TABLET, FILM COATED ORAL at 08:47

## 2023-06-25 RX ADMIN — METRONIDAZOLE 500 MG: 5 INJECTION, SOLUTION INTRAVENOUS at 19:27

## 2023-06-25 RX ADMIN — BENZONATATE 100 MG: 100 CAPSULE ORAL at 17:00

## 2023-06-25 RX ADMIN — CEFTRIAXONE 1000 MG: 1 INJECTION, SOLUTION INTRAVENOUS at 13:22

## 2023-06-25 RX ADMIN — BENZONATATE 100 MG: 100 CAPSULE ORAL at 20:48

## 2023-06-25 RX ADMIN — HEPARIN SODIUM 5000 UNITS: 5000 INJECTION INTRAVENOUS; SUBCUTANEOUS at 13:22

## 2023-06-25 RX ADMIN — HEPARIN SODIUM 5000 UNITS: 5000 INJECTION INTRAVENOUS; SUBCUTANEOUS at 05:54

## 2023-06-25 RX ADMIN — GUAIFENESIN 600 MG: 600 TABLET ORAL at 17:00

## 2023-06-25 RX ADMIN — PANTOPRAZOLE SODIUM 40 MG: 40 TABLET, DELAYED RELEASE ORAL at 05:55

## 2023-06-25 RX ADMIN — METOPROLOL SUCCINATE 25 MG: 25 TABLET, EXTENDED RELEASE ORAL at 08:47

## 2023-06-25 RX ADMIN — HEPARIN SODIUM 5000 UNITS: 5000 INJECTION INTRAVENOUS; SUBCUTANEOUS at 20:48

## 2023-06-25 NOTE — PLAN OF CARE
Problem: INFECTION - ADULT  Goal: Absence or prevention of progression during hospitalization  Description: INTERVENTIONS:  - Assess and monitor for signs and symptoms of infection  - Monitor lab/diagnostic results  - Monitor all insertion sites, i e  indwelling lines, tubes, and drains  - Monitor endotracheal if appropriate and nasal secretions for changes in amount and color  - [place] appropriate cooling/warming therapies per order  - Administer medications as ordered  - Instruct and encourage patient and family to use good hand hygiene technique  - Identify and instruct in appropriate isolation precautions for identified infection/condition  Outcome: Progressing  Goal: Absence of fever/infection during neutropenic period  Description: INTERVENTIONS:  - Monitor WBC    Outcome: Progressing     Problem: Potential for Falls  Goal: Patient will remain free of falls  Description: INTERVENTIONS:  - Educate patient/family on patient safety including physical limitations  - Instruct patient to call for assistance with activity   - Consult OT/PT to assist with strengthening/mobility   - Keep Call bell within reach  - Keep bed low and locked with side rails adjusted as appropriate  - Keep care items and personal belongings within reach  - Initiate and maintain comfort rounds  - Make Fall Risk Sign visible to staff  - Offer Toileting every 2 Hours, in advance of need  - Initiate/Maintain alarm  - Obtain necessary fall risk management equipment:  - Apply yellow socks and bracelet for high fall risk patients  - Consider moving patient to room near nurses station  Outcome: Progressing

## 2023-06-25 NOTE — PROGRESS NOTES
Patient was outside today with his sister getting fresh air before the start of my evening shift  Patient and his sister were found again outside by the security  The security brought them back to the unit

## 2023-06-25 NOTE — PLAN OF CARE
Problem: Potential for Falls  Goal: Patient will remain free of falls  Description: INTERVENTIONS:  - Educate patient/family on patient safety including physical limitations  - Instruct patient to call for assistance with activity   - Consult OT/PT to assist with strengthening/mobility   - Keep Call bell within reach  - Keep bed low and locked with side rails adjusted as appropriate  - Keep care items and personal belongings within reach  - Initiate and maintain comfort rounds  - Make Fall Risk Sign visible to staff  - Offer Toileting every 1 Hours, in advance of need  - Initiate/Maintain bed alarm  - Obtain necessary fall risk management equipment: call bell  - Apply yellow socks and bracelet for high fall risk patients  - Consider moving patient to room near nurses station  Outcome: Progressing

## 2023-06-25 NOTE — PLAN OF CARE
Problem: Potential for Falls  Goal: Patient will remain free of falls  Description: INTERVENTIONS:  - Educate patient/family on patient safety including physical limitations  - Instruct patient to call for assistance with activity   - Consult OT/PT to assist with strengthening/mobility   - Keep Call bell within reach  - Keep bed low and locked with side rails adjusted as appropriate  - Keep care items and personal belongings within reach  - Initiate and maintain comfort rounds  - Make Fall Risk Sign visible to staff  - Offer Toileting every 4 Hours, in advance of need  - Initiate/Maintain bed/chair alarm  - Obtain necessary fall risk management equipment:   - Apply yellow socks and bracelet for high fall risk patients  - Consider moving patient to room near nurses station  Outcome: Progressing     Problem: Nutrition/Hydration-ADULT  Goal: Nutrient/Hydration intake appropriate for improving, restoring or maintaining nutritional needs  Description: Monitor and assess patient's nutrition/hydration status for malnutrition  Collaborate with interdisciplinary team and initiate plan and interventions as ordered  Monitor patient's weight and dietary intake as ordered or per policy  Utilize nutrition screening tool and intervene as necessary  Determine patient's food preferences and provide high-protein, high-caloric foods as appropriate       INTERVENTIONS:  - Monitor oral intake, urinary output, labs, and treatment plans  - Assess nutrition and hydration status and recommend course of action  - Evaluate amount of meals eaten  - Assist patient with eating if necessary   - Allow adequate time for meals  - Recommend/ encourage appropriate diets, oral nutritional supplements, and vitamin/mineral supplements  - Order, calculate, and assess calorie counts as needed  - Recommend, monitor, and adjust tube feedings and TPN/PPN based on assessed needs  - Assess need for intravenous fluids  - Provide specific nutrition/hydration education as appropriate  - Include patient/family/caregiver in decisions related to nutrition  Outcome: Progressing     Problem: Prexisting or High Potential for Compromised Skin Integrity  Goal: Skin integrity is maintained or improved  Description: INTERVENTIONS:  - Identify patients at risk for skin breakdown  - Assess and monitor skin integrity  - Assess and monitor nutrition and hydration status  - Monitor labs   - Assess for incontinence   - Turn and reposition patient  - Assist with mobility/ambulation  - Relieve pressure over bony prominences  - Avoid friction and shearing  - Provide appropriate hygiene as needed including keeping skin clean and dry  - Evaluate need for skin moisturizer/barrier cream  - Collaborate with interdisciplinary team   - Patient/family teaching  - Consider wound care consult   Outcome: Progressing     Problem: MOBILITY - ADULT  Goal: Maintain or return to baseline ADL function  Description: INTERVENTIONS:  -  Assess patient's ability to carry out ADLs; assess patient's baseline for ADL function and identify physical deficits which impact ability to perform ADLs (bathing, care of mouth/teeth, toileting, grooming, dressing, etc )  - Assess/evaluate cause of self-care deficits   - Assess range of motion  - Assess patient's mobility; develop plan if impaired  - Assess patient's need for assistive devices and provide as appropriate  - Encourage maximum independence but intervene and supervise when necessary  - Involve family in performance of ADLs  - Assess for home care needs following discharge   - Consider OT consult to assist with ADL evaluation and planning for discharge  - Provide patient education as appropriate  Outcome: Progressing  Goal: Maintains/Returns to pre admission functional level  Description: INTERVENTIONS:  - Perform BMAT or MOVE assessment daily    - Set and communicate daily mobility goal to care team and patient/family/caregiver     - Collaborate with rehabilitation services on mobility goals if consulted  - Perform Range of Motion 3 times a day  - Reposition patient every 2 hours    - Dangle patient 4 times a day  - Stand patient 2 times a day  - Ambulate patient 4 times a day  - Out of bed to chair 3 times a day   - Out of bed for meals 3 times a day  - Out of bed for toileting  - Record patient progress and toleration of activity level   Outcome: Progressing     Problem: INFECTION - ADULT  Goal: Absence or prevention of progression during hospitalization  Description: INTERVENTIONS:  - Assess and monitor for signs and symptoms of infection  - Monitor lab/diagnostic results  - Monitor all insertion sites, i e  indwelling lines, tubes, and drains  - Monitor endotracheal if appropriate and nasal secretions for changes in amount and color  - [place] appropriate cooling/warming therapies per order  - Administer medications as ordered  - Instruct and encourage patient and family to use good hand hygiene technique  - Identify and instruct in appropriate isolation precautions for identified infection/condition  Outcome: Progressing  Goal: Absence of fever/infection during neutropenic period  Description: INTERVENTIONS:  - Monitor WBC    Outcome: Progressing     Problem: RESPIRATORY - ADULT  Goal: Achieves optimal ventilation and oxygenation  Description: INTERVENTIONS:  - Assess for changes in respiratory status  - Assess for changes in mentation and behavior  - Position to facilitate oxygenation and minimize respiratory effort  - Oxygen administered by appropriate delivery if ordered  - Initiate smoking cessation education as indicated  - Encourage broncho-pulmonary hygiene including cough, deep breathe, Incentive Spirometry   - Assess the need for suctioning and aspirate as needed  - Assess and instruct to report SOB or any respiratory difficulty  - Respiratory Therapy support as indicated  Outcome: Progressing

## 2023-06-25 NOTE — PROGRESS NOTES
[place]  Progress Note  Name: Jessi Cohen  MRN: [id number]  Unit/Bed#: -01 I Date of Admission: 6/23/2023   Date of Service: 6/25/2023 I Hospital Day: 2    Assessment/Plan   Acute respiratory insufficiency  Assessment & Plan  · SPO2 on room air 87 to 88%  · SPO2 stable on 2 L supplemental oxygen via nasal cannula overnight  · Wean oxygen as able  · Will need home O2 evaluation prior to discharge    Sepsis without acute organ dysfunction (HCC)  Assessment & Plan  · SIRS criteria met on admission: Tachycardia and leukocytosis  · Suspected sources possible aspiration pneumonia based on clinical symptoms, however chest x-ray is lacking for focal consolidation on unofficial review  · No severe sepsis criteria met  · Continue ceftriaxone for now  · Leukocytosis trending down  · UA is negative  · Blood cultures are negative to date    Primary hypertension  Assessment & Plan  · Home regimen: metoprolol 25 Mg daily  · Continue Toprol-XL    Depression  Assessment & Plan  · Home regimen: Wellbutrin 100 Mg twice daily  · Continue home medication    Dysphagia  Assessment & Plan  · Sister reports progressive worsening of dysphagia with upcoming speech therapy appointment  · Episode of choking on 6/18  · Speech and swallow evaluation done    Patient underwent video barium swallow and was started on dysphagia 3 with thin liquid    Intellectual disability  Assessment & Plan  · Currently living with his sister with plans to move into a group home once available  · CM consulted to possibly assist with earlier placement    GERD (gastroesophageal reflux disease)  Assessment & Plan  · Home regimen: Omeprazole 40 Mg daily and Carafate 1 g twice daily  · Continue home medication    * Aspiration pneumonia ([address] )  Assessment & Plan  · Episode of choking on 6/18 with development of cough 6/21 with worsening 6/22 and development of fever and fatigue  · Chest x-ray without clear pneumonia (? LLL "opacity), however difficult to interpret secondary to kyphoscoliosis  · Given cefepime and vancomycin in the ED -de-escalate to ceftriaxone as patient is from home  · Sputum culture and Gram stain  · Urine strep and Legionella studies is negative  · Follow-up blood culture results  · Respiratory protocol  · Aspiration precautions  · Airway clearance protocol  · Trend WBC and fever curve           Labs & Imaging: I have personally reviewed pertinent reports  VTE Prophylaxis: in place  Code Status:   Level 1 - Full Code    Patient Centered Rounds: I have performed bedside rounds with nursing staff today  Discussions with Specialists or Other Care Team Provider: CM    Education and Discussions with Family / Patient: Sister at bedside    Current Length of Stay: 2 day(s)    Current Patient Status: Inpatient   Certification Statement: The patient will continue to require additional inpatient hospital stay due to see my assessment and plan  Subjective:   Patient is seen and examined at bedside  Complains of feeling weak and tired and has poor appetite this morning  Has cough  Afebrile  All other ROS are negative  Objective:    Vitals: Blood pressure 119/66, pulse 63, temperature 97 7 °F (36 5 °C), temperature source Oral, resp  rate 18, height 4' 11\" (1 499 m), weight 46 2 kg (101 lb 12 8 oz), SpO2 94 %  ,Body mass index is 20 56 kg/m²  SPO2 RA Rest    Flowsheet Row ED to Hosp-Admission (Current) from 6/23/2023 in [address] Med Surg Unit   SpO2 94 %   SpO2 Activity At Rest   O2 Device Nasal cannula   O2 Flow Rate --        I&O: No intake or output data in the 24 hours ending 06/25/23 0917    Physical Exam:    General- Alert, sitting comfortably in chair  Not in any acute distress    CVS- regular, S1 and S2 normal  Chest- Bilateral Air entry, rhonchi present  Abdomen- soft, nontender, not distended, no guarding or rigidity, BS+  Extremities-  No pedal edema, No calf tenderness         " Normal ROM in all extremities  CNS-   Alert, awake  At baseline  No focal deficits present      Invasive Devices     Peripheral Intravenous Line  Duration           Peripheral IV 06/25/23 Left Antecubital <1 day                      Social History  reviewed  Family History   Problem Relation Age of Onset   • Heart disease Mother    • Parkinsonism Mother    • Heart disease Father    • Alcohol abuse Neg Hx    • Substance Abuse Neg Hx    • Mental illness Neg Hx     reviewed    Meds:  Current Facility-Administered Medications   Medication Dose Route Frequency Provider Last Rate Last Admin   • albuterol inhalation solution 2 5 mg  2 5 mg Nebulization Q6H PRN Ashutosh Kinney MD   2 5 mg at 06/24/23 1052   • benzonatate (TESSALON PERLES) capsule 100 mg  100 mg Oral TID Ashutosh Kinney MD   100 mg at 06/25/23 0847   • buPROPion [facility]) tablet 100 mg  100 mg Oral Daily Chrissy Russo PA-C   100 mg at 06/25/23 0847   • cefTRIAXone (ROCEPHIN) IVPB (premix in dextrose) 1,000 mg 50 mL  1,000 mg Intravenous Q24H Kelli Ramsey PA-C 100 mL/hr at 06/24/23 1315 1,000 mg at 06/24/23 1315   • heparin (porcine) subcutaneous injection 5,000 Units  5,000 Units Subcutaneous [facility] Kelli Ramsey PA-C   5,000 Units at 06/25/23 0554   • metoprolol succinate (TOPROL-XL) 24 hr tablet 25 mg  25 mg Oral Daily Chrissy Russo PA-C   25 mg at 06/25/23 0847   • metroNIDAZOLE (FLAGYL) IVPB (premix) 500 mg 100 mL  500 mg Intravenous Q8H Ashutosh Kinney  mL/hr at 06/25/23 0400 500 mg at 06/25/23 0400   • ondansetron (ZOFRAN) injection 4 mg  4 mg Intravenous Q6H PRN Angie Randhawa PA-C   4 mg at 06/24/23 1415   • pantoprazole (PROTONIX) EC tablet 40 mg  40 mg Oral Early Morning Chrissy Russo PA-C   40 mg at 06/25/23 0555   • polyethylene glycol (MIRALAX) packet 17 g  17 g Oral Daily Chrissy Russo PA-C       • pravastatin (PRAVACHOL) tablet 80 mg  80 mg Oral Daily With JOLYNN Dunbar   80 mg at "06/24/23 1806      Medications Prior to Admission   Medication   • acetaminophen (TYLENOL) 325 mg tablet   • buPROPion (WELLBUTRIN) 100 mg tablet   • ketoconazole (NIZORAL) 2 % shampoo   • loratadine (CLARITIN) 10 mg tablet   • metoprolol succinate (TOPROL-XL) 25 mg 24 hr tablet   • omeprazole (PriLOSEC) 40 MG capsule   • polyethylene glycol (MIRALAX) 17 g packet   • simvastatin (ZOCOR) 40 mg tablet   • sucralfate (CARAFATE) 1 g tablet   • olopatadine (PATANOL) 0 1 % ophthalmic solution       Labs:  Results from last 7 days   Lab Units 06/25/23  0346 06/24/23  0422 06/23/23  0556 06/23/23  0059   WBC Thousand/uL 10 77* 15 54* 23 67* 25 39*   HEMOGLOBIN g/dL 13 2 13 1 14 0 15 5   HEMATOCRIT % 41 1 40 1 41 7 46 4   PLATELETS Thousands/uL 182 164 180 191   NEUTROS PCT % 67 80*  --  87*   LYMPHS PCT % 14 7*  --  4*   MONOS PCT % 10 9  --  8   EOS PCT % 8* 3  --  0     Results from last 7 days   Lab Units 06/25/23  0346 06/24/23  0417 06/23/23  0556 06/23/23  0059   POTASSIUM mmol/L 3 9 3 4* 3 6 4 7   CHLORIDE mmol/L 107 106 105 104   CO2 mmol/L 27 25 24 23   BUN mg/dL 11 13 21 19   CREATININE mg/dL 0 74 0 72 0 90 1 06   CALCIUM mg/dL 8 6 8 0* 8 8 9 3   ALK PHOS U/L  --   --   --  115*   ALT U/L  --   --   --  36   AST U/L  --   --   --  45*     No results found for: \"TROPONINI\", \"CKMB\", \"CKTOTAL\"  Results from last 7 days   Lab Units 06/23/23  0059   INR  0 98     Lab Results   Component Value Date    BLOODCX No Growth at 48 hrs  06/23/2023    BLOODCX No Growth at 48 hrs  06/23/2023         Imaging:  Results for orders placed during the hospital encounter of 06/23/23    XR chest portable    Narrative  CHEST    INDICATION:   cough, fever  COMPARISON:  None    EXAM PERFORMED/VIEWS:  XR CHEST PORTABLE      FINDINGS:    Cardiomediastinal silhouette appears unremarkable  The lungs are clear  No pneumothorax or pleural effusion  Marked upper thoracic scoliosis, convex to the right   Imaged bones otherwise " unremarkable  Impression  No acute disease in the chest       Workstation performed: [id number]    No results found for this or any previous visit  Last 24 Hours Medication List:   Current Facility-Administered Medications   Medication Dose Route Frequency Provider Last Rate   • albuterol  2 5 mg Nebulization Q6H PRN Aditi Ward MD     • benzonatate  100 mg Oral TID Aditi Ward MD     • buPROPion  100 mg Oral Daily Be Stallworth PA-C     • cefTRIAXone  1,000 mg Intravenous Q24H Lucio Bay PA-C 1,000 mg (06/24/23 1315)   • heparin (porcine)  5,000 Units Subcutaneous Q8H [address] Lucio Bya PA-C     • metoprolol succinate  25 mg Oral Daily Be Stallworth PA-C     • metroNIDAZOLE  500 mg Intravenous Q8H Aditi Ward  mg (06/25/23 0400)   • ondansetron  4 mg Intravenous Q6H PRN Maninder Randhawa PA-C     • pantoprazole  40 mg Oral Early Morning Chrissy Russo PA-C     • polyethylene glycol  17 g Oral Daily Chrissy Russo PA-C     • pravastatin  80 mg Oral Daily With Dinner Be Stallworth PA-C          Today, Patient Was Seen By: Aditi Ward MD    ** Please Note: Dictation voice to text software may have been used in the creation of this document   **

## 2023-06-25 NOTE — ASSESSMENT & PLAN NOTE
· SIRS criteria met on admission: Tachycardia and leukocytosis  · Suspected sources possible aspiration pneumonia based on clinical symptoms, however chest x-ray is lacking for focal consolidation on unofficial review  · No severe sepsis criteria met  · Continue ceftriaxone for now  · Leukocytosis trending down  · UA is negative  · Blood cultures are negative to date

## 2023-06-25 NOTE — ASSESSMENT & PLAN NOTE
· SPO2 on room air 87 to 88%  · SPO2 stable on 2 L supplemental oxygen via nasal cannula overnight  · Wean oxygen as able  · Will need home O2 evaluation prior to discharge

## 2023-06-26 LAB
ANION GAP SERPL CALCULATED.3IONS-SCNC: 5 MMOL/L
BASOPHILS # BLD AUTO: 0.07 THOUSANDS/ÂΜL (ref 0–0.1)
BASOPHILS NFR BLD AUTO: 1 % (ref 0–1)
BUN SERPL-MCNC: 8 MG/DL (ref 5–25)
CALCIUM SERPL-MCNC: 8.7 MG/DL (ref 8.4–10.2)
CHLORIDE SERPL-SCNC: 109 MMOL/L (ref 96–108)
CO2 SERPL-SCNC: 27 MMOL/L (ref 21–32)
CREAT SERPL-MCNC: 0.67 MG/DL (ref 0.6–1.3)
EOSINOPHIL # BLD AUTO: 0.72 THOUSAND/ÂΜL (ref 0–0.61)
EOSINOPHIL NFR BLD AUTO: 9 % (ref 0–6)
ERYTHROCYTE [DISTWIDTH] IN BLOOD BY AUTOMATED COUNT: 13 % (ref 11.6–15.1)
GFR SERPL CREATININE-BSD FRML MDRD: 103 ML/MIN/1.73SQ M
GLUCOSE SERPL-MCNC: 95 MG/DL (ref 65–140)
HCT VFR BLD AUTO: 40.9 % (ref 36.5–49.3)
HGB BLD-MCNC: 13.2 G/DL (ref 12–17)
IMM GRANULOCYTES # BLD AUTO: 0.04 THOUSAND/UL (ref 0–0.2)
IMM GRANULOCYTES NFR BLD AUTO: 1 % (ref 0–2)
LYMPHOCYTES # BLD AUTO: 1.32 THOUSANDS/ÂΜL (ref 0.6–4.47)
LYMPHOCYTES NFR BLD AUTO: 16 % (ref 14–44)
MCH RBC QN AUTO: 30.7 PG (ref 26.8–34.3)
MCHC RBC AUTO-ENTMCNC: 32.3 G/DL (ref 31.4–37.4)
MCV RBC AUTO: 95 FL (ref 82–98)
MONOCYTES # BLD AUTO: 0.87 THOUSAND/ÂΜL (ref 0.17–1.22)
MONOCYTES NFR BLD AUTO: 11 % (ref 4–12)
NEUTROPHILS # BLD AUTO: 5.11 THOUSANDS/ÂΜL (ref 1.85–7.62)
NEUTS SEG NFR BLD AUTO: 62 % (ref 43–75)
NRBC BLD AUTO-RTO: 0 /100 WBCS
PLATELET # BLD AUTO: 186 THOUSANDS/UL (ref 149–390)
PMV BLD AUTO: 10.4 FL (ref 8.9–12.7)
POTASSIUM SERPL-SCNC: 3.8 MMOL/L (ref 3.5–5.3)
RBC # BLD AUTO: 4.3 MILLION/UL (ref 3.88–5.62)
SODIUM SERPL-SCNC: 141 MMOL/L (ref 135–147)
WBC # BLD AUTO: 8.13 THOUSAND/UL (ref 4.31–10.16)

## 2023-06-26 PROCEDURE — 92526 ORAL FUNCTION THERAPY: CPT

## 2023-06-26 PROCEDURE — 94668 MNPJ CHEST WALL SBSQ: CPT

## 2023-06-26 PROCEDURE — 80048 BASIC METABOLIC PNL TOTAL CA: CPT | Performed by: INTERNAL MEDICINE

## 2023-06-26 PROCEDURE — 99232 SBSQ HOSP IP/OBS MODERATE 35: CPT | Performed by: PHYSICIAN ASSISTANT

## 2023-06-26 PROCEDURE — 85025 COMPLETE CBC W/AUTO DIFF WBC: CPT | Performed by: INTERNAL MEDICINE

## 2023-06-26 RX ADMIN — METRONIDAZOLE 500 MG: 5 INJECTION, SOLUTION INTRAVENOUS at 11:43

## 2023-06-26 RX ADMIN — GUAIFENESIN 600 MG: 600 TABLET ORAL at 09:43

## 2023-06-26 RX ADMIN — BENZONATATE 100 MG: 100 CAPSULE ORAL at 09:52

## 2023-06-26 RX ADMIN — HEPARIN SODIUM 5000 UNITS: 5000 INJECTION INTRAVENOUS; SUBCUTANEOUS at 05:41

## 2023-06-26 RX ADMIN — HEPARIN SODIUM 5000 UNITS: 5000 INJECTION INTRAVENOUS; SUBCUTANEOUS at 21:59

## 2023-06-26 RX ADMIN — PANTOPRAZOLE SODIUM 40 MG: 40 TABLET, DELAYED RELEASE ORAL at 05:41

## 2023-06-26 RX ADMIN — BENZONATATE 100 MG: 100 CAPSULE ORAL at 21:59

## 2023-06-26 RX ADMIN — METRONIDAZOLE 500 MG: 5 INJECTION, SOLUTION INTRAVENOUS at 18:40

## 2023-06-26 RX ADMIN — CEFTRIAXONE 1000 MG: 1 INJECTION, SOLUTION INTRAVENOUS at 13:00

## 2023-06-26 RX ADMIN — METOPROLOL SUCCINATE 25 MG: 25 TABLET, EXTENDED RELEASE ORAL at 09:43

## 2023-06-26 RX ADMIN — GUAIFENESIN 600 MG: 600 TABLET ORAL at 17:48

## 2023-06-26 RX ADMIN — BUPROPION HYDROCHLORIDE 100 MG: 100 TABLET, FILM COATED ORAL at 09:52

## 2023-06-26 RX ADMIN — HEPARIN SODIUM 5000 UNITS: 5000 INJECTION INTRAVENOUS; SUBCUTANEOUS at 13:00

## 2023-06-26 RX ADMIN — METRONIDAZOLE 500 MG: 5 INJECTION, SOLUTION INTRAVENOUS at 02:00

## 2023-06-26 RX ADMIN — BENZONATATE 100 MG: 100 CAPSULE ORAL at 17:48

## 2023-06-26 RX ADMIN — PRAVASTATIN SODIUM 80 MG: 80 TABLET ORAL at 17:48

## 2023-06-26 NOTE — SPEECH THERAPY NOTE
"Speech Language/Pathology    Speech/Language Pathology Progress Note    Patient Name: Pollo Garza  Today's Date: 6/26/2023     Problem List  Principal Problem:    Aspiration pneumonia ([facility] [address] )  Active Problems:    GERD (gastroesophageal reflux disease)    Intellectual disability    Dysphagia    Depression    Primary hypertension    Sepsis without acute organ dysfunction ([facility] [address] )    Acute respiratory insufficiency       Past Medical History  Past Medical History:   Diagnosis Date   • Depression    • GERD (gastroesophageal reflux disease)    • Intellectual disability    • Scoliosis, unspecified scoliosis type, unspecified spinal region         Past Surgical History  Past Surgical History:   Procedure Laterality Date   • APPENDECTOMY     • CHOLECYSTECTOMY     • HERNIA REPAIR     • PANCREAS SURGERY N/A     Removal of a mass perhaps 2018   • PROSTATE SURGERY N/A     \"Laser prostate surgery \"         Subjective:  Pt OOB in chair, sister is present  Lunch tray present  \"Do you want to hear a funny joke\"     Current Diet:  Dysphagia 3 w/ thin     Objective:  Pt seen for dx dysphagia tx f/u to assess oropharyngeal swallow skill across meal  Pt assessed w/ chopped turkey in gravy, chopped broccoli, and macaroni and cheese  Pt w/ slow rate of intake, takes small bites as encouraged  Effective mastication and oral organization of all material  Pt transfers without oral residue  Straw sips of thin liquid taken throughout meal without difficulty  No overt s/s aspiration across tray  Education reviewed on MBSS findings and recommendations to optimize swallow safety  Pt and pt's sister w/ several relevant questions, all answered to their satisfaction w/ understanding  Pt agreeable to continuation of current diet and strategies  Assessment:  Pt appears to be well tolerating current diet without overt s/s aspiration  Pt/pt's sister (caregiver) w/ good understanding of recommendations after MBS and plan for ST f/u   " Plan/Recommendations:  Continue current diet  Aspiration precautions  ST to f/u as able/appropriate

## 2023-06-26 NOTE — ASSESSMENT & PLAN NOTE
· SPO2 on room air 87 to 88%  · SPO2 stable on 2 L supplemental oxygen via nasal cannula overnight  · Wean oxygen as able  · Patient able to be weaned to RA while awake/with ambulation however still with desats overnight while sleeping  · Will obtain overnight pulse ox to evaluate if patient qualifies for home oxygen nightly

## 2023-06-26 NOTE — CASE MANAGEMENT
Case Management Discharge Planning Note    Patient name Maralyn Schlatter  Location /[place] [id number] MRN [id number]  : 1960 Date 2023       Current Admission Date: 2023  Current Admission Diagnosis:Aspiration pneumonia [facility])   Patient Active Problem List    Diagnosis Date Noted   • Dysphagia 2023   • Aspiration pneumonia ([facility] [address] ) 2023   • Depression 2023   • Primary hypertension 2023   • Sepsis without acute organ dysfunction ([address] ) 2023   • Acute respiratory insufficiency 2023   • Intellectual disability 2023   • Kevin Syndrome 2023   • Neuroendocrine carcinoma ([facility] [address] ) 2023   • Kyphoscoliosis and scoliosis 2023   • Recurrent major depressive disorder ([facility] [address] ) 10/04/2022   • Abnormal gait 2021   • Sensorineural hearing loss 2019   • Urinary incontinence 2018   • Anxiety 11/15/2016   • Allergic rhinitis 10/14/2016   • BPH (benign prostatic hyperplasia) 10/14/2016   • GERD (gastroesophageal reflux disease) 10/14/2016   • Mixed hyperlipidemia 10/14/2016   • Other psoriasis 10/14/2016   • Neuroendocrine tumor of pancreas 2014      LOS (days): 3  Geometric Mean LOS (GMLOS) (days): 5 00  Days to GMLOS:1 4     OBJECTIVE:  Risk of Unplanned Readmission Score: 9 06      Current admission status: Inpatient   Preferred Pharmacy:   [address] [place] [address]  Phone: [phone] Fax: [phone]    Primary Care Provider: Vivian Fung DO    Primary Insurance: MEDICARE  Secondary Insurance:     DISCHARGE DETAILS:    Discharge planning discussed with[de-identified] pt and his sister  Freedom of Choice: Yes     CM contacted family/caregiver?: Yes  Were Treatment Team discharge recommendations reviewed with patient/caregiver?: Yes  Did patient/caregiver verbalize understanding of patient care needs?: Yes  Were patient/caregiver advised of the risks associated with not following Treatment Team discharge recommendations?: Yes    Contacts  Patient Contacts: Muna Ivory  Relationship to Patient[de-identified] Family  Contact Method: In Person  Reason/Outcome: Discharge [address]         Is the patient interested in [facility] at discharge?: Yes  [address] requested[de-identified] Occupational Therapy, Physical [address] Name[de-identified] Other ([address])  [facility] External Referral Reason (only applicable if external HHA name selected): Scheduling access issues  [facility] Provider[de-identified] PCP  Home Health Services Needed[de-identified] Evaluate Functional Status and Safety, Strengthening/Theraputic Exercises to Improve Function, Gait/ADL Training  Homebound Criteria Met[de-identified] Requires the Assistance of Another Person for Safe Ambulation or to Leave the Home, Uses an Assist Device (i e  cane, walker, etc)  Supporting Clincal Findings[de-identified] Cognitive Deficit Requiring the Assistance of Others, Fatigues Easliy in Short Distances, Limited Endurance    DME Referral Provided  Referral made for DME?: No    Other Referral/Resources/Interventions Provided:  Interventions: [facility]    Treatment Team Recommendation[facility]  Discharge Destination Plan[de-identified] Home with Evelyn at Discharge : Family     IMM Given (Date):: 06/26/23  IMM Given to[de-identified] Family  Family notified[de-identified] Reviewed with pt's sister  Additional Comments: Cm reviewed IMM with pt's sister and informed her that [facility] care Home Care accepted pt for home PT/OT

## 2023-06-26 NOTE — ASSESSMENT & PLAN NOTE
· Episode of choking on 6/18 with development of cough 6/21 with worsening 6/22 and development of fever and fatigue  · Given cefepime and vancomycin in the ED -de-escalate to ceftriaxone as patient is from home  · Sputum culture and Gram stain  · Urine strep and Legionella studies is negative  · Blood cultures negative x2 after 48 hours  · Respiratory protocol  · Aspiration precautions  · Airway clearance protocol  · Leukocytosis has resolved  · Continue dysphagia diet per speech therapy

## 2023-06-26 NOTE — ASSESSMENT & PLAN NOTE
· SIRS criteria met on admission: Tachycardia and leukocytosis  · In setting of possible aspiration pneumonia  · No severe sepsis criteria met  · On IV Rocephin  · Leukocytosis resolved  · UA is negative  · Blood cultures are negative to date

## 2023-06-26 NOTE — CASE MANAGEMENT
Case Management Discharge Planning Note    Patient name Kristan Eaton  Location /-62 MRN [id number]  : 1960 Date 2023       Current Admission Date: 2023  Current Admission Diagnosis:Aspiration pneumonia [facility])   Patient Active Problem List    Diagnosis Date Noted   • Dysphagia 2023   • Aspiration pneumonia ([address] ) 2023   • Depression 2023   • Primary hypertension 2023   • Sepsis without acute organ dysfunction ([address] ) 2023   • Acute respiratory insufficiency 2023   • Intellectual disability 2023   • Kevin Syndrome 2023   • Neuroendocrine carcinoma ([address] ) 2023   • Kyphoscoliosis and scoliosis 2023   • Recurrent major depressive disorder ([address] ) 10/04/2022   • Abnormal gait 2021   • Sensorineural hearing loss 2019   • Urinary incontinence 2018   • Anxiety 11/15/2016   • Allergic rhinitis 10/14/2016   • BPH (benign prostatic hyperplasia) 10/14/2016   • GERD (gastroesophageal reflux disease) 10/14/2016   • Mixed hyperlipidemia 10/14/2016   • Other psoriasis 10/14/2016   • Neuroendocrine tumor of pancreas 2014      LOS (days): 3  Geometric Mean LOS (GMLOS) (days): 5 00  Days to GMLOS:1 4     OBJECTIVE:  Risk of Unplanned Readmission Score: 9 08      Current admission status: Inpatient   Preferred Pharmacy:   [facility] [address] [place] [address] Drive [address] [place], [place] [id number]  Phone: [phone] Fax: [phone]    Primary Care Provider: Som Avalos DO    Primary Insurance: MEDICARE  Secondary Insurance:     DISCHARGE DETAILS:    Discharge planning discussed with[de-identified] pt and his sister  Freedom of Choice: Yes     CM contacted family/caregiver?: Yes  Were Treatment Team discharge recommendations reviewed with patient/caregiver?: Yes  Did patient/caregiver verbalize understanding of patient care needs?: Yes  Were patient/caregiver advised of the risks associated with not following Treatment Team discharge recommendations?: Yes    Contacts  Patient Contacts: Claudetta Solian  Relationship to Patient[de-identified] Family  Contact Method: Phone  Reason/Outcome: Discharge [address]         Is the patient interested in [facility] at discharge?: No  [address] requested[de-identified] Occupational Therapy, Physical [address] Name[de-identified] Other ([address])  [facility] External Referral Reason (only applicable if external HHA name selected): Scheduling access issues  [facility] Provider[de-identified] PCP  Home Health Services Needed[de-identified] Evaluate Functional Status and Safety, Strengthening/Theraputic Exercises to Improve Function, Gait/ADL Training  Homebound Criteria Met[de-identified] Requires the Assistance of Another Person for Safe Ambulation or to Leave the Home, Uses an Assist Device (i e  cane, walker, etc)  Supporting Clincal Findings[de-identified] Cognitive Deficit Requiring the Assistance of Others, Fatigues Easliy in Short Distances, Limited Endurance    DME Referral Provided  Referral made for DME?: No    Other Referral/Resources/Interventions Provided:  Interventions: Outpatient PT    Treatment Team Recommendation: Home  Discharge Destination Plan[de-identified] Home  Transport at Discharge : Family     IMM Given (Date):: 06/26/23  IMM Given to[de-identified] Family  Family notified[de-identified] Reviewed with pt's sister  Additional Comments: Cm reviewed IMM with pt's sister and she asked for outpt PT  CM will notify physician to write order and closest outpt PT office added to d/c instructions

## 2023-06-26 NOTE — PLAN OF CARE
Problem: Potential for Falls  Goal: Patient will remain free of falls  Description: INTERVENTIONS:  - Educate patient/family on patient safety including physical limitations  - Instruct patient to call for assistance with activity   - Consult OT/PT to assist with strengthening/mobility   - Keep Call bell within reach  - Keep bed low and locked with side rails adjusted as appropriate  - Keep care items and personal belongings within reach  - Initiate and maintain comfort rounds  - Make Fall Risk Sign visible to staff  - Offer Toileting every 2 Hours, in advance of need  - Initiate/Maintain bed/chair alarm  - Obtain necessary fall risk management equipment:   - Apply yellow socks and bracelet for high fall risk patients  - Consider moving patient to room near nurses station  Outcome: Progressing     Problem: Nutrition/Hydration-ADULT  Goal: Nutrient/Hydration intake appropriate for improving, restoring or maintaining nutritional needs  Description: Monitor and assess patient's nutrition/hydration status for malnutrition  Collaborate with interdisciplinary team and initiate plan and interventions as ordered  Monitor patient's weight and dietary intake as ordered or per policy  Utilize nutrition screening tool and intervene as necessary  Determine patient's food preferences and provide high-protein, high-caloric foods as appropriate       INTERVENTIONS:  - Monitor oral intake, urinary output, labs, and treatment plans  - Assess nutrition and hydration status and recommend course of action  - Evaluate amount of meals eaten  - Assist patient with eating if necessary   - Allow adequate time for meals  - Recommend/ encourage appropriate diets, oral nutritional supplements, and vitamin/mineral supplements  - Order, calculate, and assess calorie counts as needed  - Recommend, monitor, and adjust tube feedings and TPN/PPN based on assessed needs  - Assess need for intravenous fluids  - Provide specific nutrition/hydration education as appropriate  - Include patient/family/caregiver in decisions related to nutrition  Outcome: Progressing     Problem: Prexisting or High Potential for Compromised Skin Integrity  Goal: Skin integrity is maintained or improved  Description: INTERVENTIONS:  - Identify patients at risk for skin breakdown  - Assess and monitor skin integrity  - Assess and monitor nutrition and hydration status  - Monitor labs   - Assess for incontinence   - Turn and reposition patient  - Assist with mobility/ambulation  - Relieve pressure over bony prominences  - Avoid friction and shearing  - Provide appropriate hygiene as needed including keeping skin clean and dry  - Evaluate need for skin moisturizer/barrier cream  - Collaborate with interdisciplinary team   - Patient/family teaching  - Consider wound care consult   Outcome: Progressing     Problem: MOBILITY - ADULT  Goal: Maintain or return to baseline ADL function  Description: INTERVENTIONS:  -  Assess patient's ability to carry out ADLs; assess patient's baseline for ADL function and identify physical deficits which impact ability to perform ADLs (bathing, care of mouth/teeth, toileting, grooming, dressing, etc )  - Assess/evaluate cause of self-care deficits   - Assess range of motion  - Assess patient's mobility; develop plan if impaired  - Assess patient's need for assistive devices and provide as appropriate  - Encourage maximum independence but intervene and supervise when necessary  - Involve family in performance of ADLs  - Assess for home care needs following discharge   - Consider OT consult to assist with ADL evaluation and planning for discharge  - Provide patient education as appropriate  Outcome: Progressing  Goal: Maintains/Returns to pre admission functional level  Description: INTERVENTIONS:  - Perform BMAT or MOVE assessment daily    - Set and communicate daily mobility goal to care team and patient/family/caregiver     - Collaborate with rehabilitation services on mobility goals if consulted  - Perform Range of Motion 3 times a day  - Reposition patient every 2 hours    - Dangle patient 3 times a day  - Stand patient 5 times a day  - Ambulate patient 3 times a day  - Out of bed to chair 3 times a day   - Out of bed for meals 3 times a day  - Out of bed for toileting  - Record patient progress and toleration of activity level   Outcome: Progressing     Problem: INFECTION - ADULT  Goal: Absence or prevention of progression during hospitalization  Description: INTERVENTIONS:  - Assess and monitor for signs and symptoms of infection  - Monitor lab/diagnostic results  - Monitor all insertion sites, i e  indwelling lines, tubes, and drains  - Monitor endotracheal if appropriate and nasal secretions for changes in amount and color  - [place] appropriate cooling/warming therapies per order  - Administer medications as ordered  - Instruct and encourage patient and family to use good hand hygiene technique  - Identify and instruct in appropriate isolation precautions for identified infection/condition  Outcome: Progressing  Goal: Absence of fever/infection during neutropenic period  Description: INTERVENTIONS:  - Monitor WBC    Outcome: Progressing     Problem: RESPIRATORY - ADULT  Goal: Achieves optimal ventilation and oxygenation  Description: INTERVENTIONS:  - Assess for changes in respiratory status  - Assess for changes in mentation and behavior  - Position to facilitate oxygenation and minimize respiratory effort  - Oxygen administered by appropriate delivery if ordered  - Initiate smoking cessation education as indicated  - Encourage broncho-pulmonary hygiene including cough, deep breathe, Incentive Spirometry  - Assess the need for suctioning and aspirate as needed  - Assess and instruct to report SOB or any respiratory difficulty  - Respiratory Therapy support as indicated  Outcome: Progressing

## 2023-06-26 NOTE — CASE MANAGEMENT
Case Management Discharge Planning Note    Patient name Avonne Meigs  Location /-82 MRN [id number]  : 1960 Date 2023       Current Admission Date: 2023  Current Admission Diagnosis:Aspiration pneumonia [facility])   Patient Active Problem List    Diagnosis Date Noted   • Dysphagia 2023   • Aspiration pneumonia ([address] ) 2023   • Depression 2023   • Primary hypertension 2023   • Sepsis without acute organ dysfunction ([address] ) 2023   • Acute respiratory insufficiency 2023   • Intellectual disability 2023   • Kevin Syndrome 2023   • Neuroendocrine carcinoma ([address] ) 2023   • Kyphoscoliosis and scoliosis 2023   • Recurrent major depressive disorder ([address] ) 10/04/2022   • Abnormal gait 2021   • Sensorineural hearing loss 2019   • Urinary incontinence 2018   • Anxiety 11/15/2016   • Allergic rhinitis 10/14/2016   • BPH (benign prostatic hyperplasia) 10/14/2016   • GERD (gastroesophageal reflux disease) 10/14/2016   • Mixed hyperlipidemia 10/14/2016   • Other psoriasis 10/14/2016   • Neuroendocrine tumor of pancreas 2014      LOS (days): 3  Geometric Mean LOS (GMLOS) (days): 5 00  Days to GMLOS:1 7     OBJECTIVE:  Risk of Unplanned Readmission Score: 9 03      Current admission status: Inpatient   Preferred Pharmacy:   [address] [place] [id number]  Phone: [phone] Fax: [phone]    Primary Care Provider: Eli Ricardo DO    Primary Insurance: MEDICARE  Secondary Insurance:     DISCHARGE DETAILS:    Discharge planning discussed with[de-identified] pt and his sister  Freedom of Choice: Yes     CM contacted family/caregiver?: Yes  Were Treatment Team discharge recommendations reviewed with patient/caregiver?: Yes  Did patient/caregiver verbalize understanding of patient care needs?: Yes  Were patient/caregiver advised of the risks associated with not following Treatment Team discharge recommendations?: Yes    Contacts  Patient Contacts: Kaylee Myers  Relationship to Patient[de-identified] Family  Contact Method:  In Person  Reason/Outcome: Discharge [address]         Is the patient interested in [facility] at discharge?: Yes  [address] requested[de-identified] Occupational Therapy, Physical [address] Name[de-identified] [facility] External Referral Reason (only applicable if external HHA name selected): Scheduling access issues  [facility] Provider[de-identified] PCP  Home Health Services Needed[de-identified] Evaluate Functional Status and Safety, Strengthening/Theraputic Exercises to Improve Function, Gait/ADL Training  Homebound Criteria Met[de-identified] Requires the Assistance of Another Person for Safe Ambulation or to Leave the Home, Uses an Assist Device (i e  cane, walker, etc)  Supporting Clincal Findings[de-identified] Cognitive Deficit Requiring the Assistance of Others, Fatigues [place] in [employer], Limited Endurance    [facility]: [facility]  Discharge Destination Plan[de-identified] Home with Evelyn at Discharge : Family

## 2023-06-26 NOTE — PROGRESS NOTES
[place]  Progress Note  Name: Rachel Robles I  MRN: [id number]  Unit/Bed#: -01 I Date of Admission: 6/23/2023   Date of Service: 6/26/2023 I Hospital Day: 3    Assessment/Plan   * Aspiration pneumonia [facility])  Assessment & Plan  · Episode of choking on 6/18 with development of cough 6/21 with worsening 6/22 and development of fever and fatigue  · Given cefepime and vancomycin in the ED -de-escalate to ceftriaxone as patient is from home  · Sputum culture and Gram stain  · Urine strep and Legionella studies is negative  · Blood cultures negative x2 after 48 hours  · Respiratory protocol  · Aspiration precautions  · Airway clearance protocol  · Leukocytosis has resolved  · Continue dysphagia diet per speech therapy    Acute respiratory insufficiency  Assessment & Plan  · SPO2 on room air 87 to 88%  · SPO2 stable on 2 L supplemental oxygen via nasal cannula overnight  · Wean oxygen as able  · Patient able to be weaned to RA while awake/with ambulation however still with desats overnight while sleeping  · Will obtain overnight pulse ox to evaluate if patient qualifies for home oxygen nightly    Sepsis without acute organ dysfunction (HCC)  Assessment & Plan  · SIRS criteria met on admission: Tachycardia and leukocytosis  · In setting of possible aspiration pneumonia  · No severe sepsis criteria met  · On IV Rocephin  · Leukocytosis resolved  · UA is negative  · Blood cultures are negative to date    Primary hypertension  Assessment & Plan  · Home regimen: metoprolol 25 Mg daily  · Continue Toprol-XL    Depression  Assessment & Plan  · Home regimen: Wellbutrin 100 Mg twice daily  · Continue home medication    Dysphagia  Assessment & Plan  · Sister reports progressive worsening of dysphagia with upcoming speech therapy appointment  · Episode of choking on 6/18  · Speech and swallow evaluation done    Patient underwent video barium swallow and was started on dysphagia 3 with thin liquid    Intellectual disability  Assessment & Plan  · Currently living with his sister with plans to move into a group home once available    GERD (gastroesophageal reflux disease)  Assessment & Plan  · Home regimen: Omeprazole 40 Mg daily and Carafate 1 g twice daily  · Continue home medication        VTE Pharmacologic Prophylaxis: VTE Score: 4 Moderate Risk (Score 3-4) - Pharmacological DVT Prophylaxis Ordered: heparin  Patient Centered Rounds: I performed bedside rounds with nursing staff today  Discussions with Specialists or Other Care Team Provider: MARY    Education and Discussions with Family / Patient: Updated  (daughter) at bedside  Total Time Spent on Date of Encounter in care of patient: 45 minutes This time was spent on one or more of the following: performing physical exam; counseling and coordination of care; obtaining or reviewing history; documenting in the medical record; reviewing/ordering tests, medications or procedures; communicating with other healthcare professionals and discussing with patient's family/caregivers  Current Length of Stay: 3 day(s)  Current Patient Status: Inpatient   Certification Statement: The patient will continue to require additional inpatient hospital stay due to Overnight pulse ox, monitoring respiratory status  Discharge Plan: Anticipate discharge tomorrow to home  Code Status: Level 1 - Full Code    Subjective:   Patient continues to feel well  He has been able to walk around the unit with his sister  Feels his breathing is better  Looking forward to going home soon  Objective:     Vitals:   Temp (24hrs), Av 1 °F (37 3 °C), Min:98 4 °F (36 9 °C), Max:99 7 °F (37 6 °C)    Temp:  [98 4 °F (36 9 °C)-99 7 °F (37 6 °C)] 99 7 °F (37 6 °C)  HR:  [70-77] 70  Resp:  [16-18] 16  BP: (120-125)/(64-90) 121/82  SpO2:  [87 %-95 %] 95 %  Body mass index is 20 56 kg/m²  Input and Output Summary (last 24 hours):      Intake/Output Summary (Last 24 hours) at 6/26/2023 1259  Last data filed at 6/26/2023 0916  Gross per 24 hour   Intake 600 ml   Output --   Net 600 ml       Physical Exam:   Physical Exam  Vitals and nursing note reviewed  Constitutional:       General: He is not in acute distress  Appearance: He is well-developed  Comments: No acute distress   HENT:      Head: Normocephalic and atraumatic  Eyes:      General: No scleral icterus  Extraocular Movements: Extraocular movements intact  Conjunctiva/sclera: Conjunctivae normal    Cardiovascular:      Rate and Rhythm: Normal rate and regular rhythm  Heart sounds: No murmur heard  Pulmonary:      Effort: Pulmonary effort is normal  No respiratory distress  Breath sounds: Rhonchi present  Comments: Coarse breath sounds that clear with cough  Abdominal:      General: Bowel sounds are normal       Palpations: Abdomen is soft  Tenderness: There is no abdominal tenderness  There is no guarding or rebound  Musculoskeletal:         General: No swelling  Cervical back: Normal range of motion  Comments: Able to move upper/lower extremities bilaterally, no edema   Skin:     General: Skin is warm and dry  Capillary Refill: Capillary refill takes less than 2 seconds  Neurological:      Mental Status: He is alert  Mental status is at baseline     Psychiatric:         Mood and Affect: Mood normal           Additional Data:     Labs:  Results from last 7 days   Lab Units 06/26/23  0454   WBC Thousand/uL 8 13   HEMOGLOBIN g/dL 13 2   HEMATOCRIT % 40 9   PLATELETS Thousands/uL 186   NEUTROS PCT % 62   LYMPHS PCT % 16   MONOS PCT % 11   EOS PCT % 9*     Results from last 7 days   Lab Units 06/26/23  0454 06/23/23  0556 06/23/23  0059   SODIUM mmol/L 141   < > 135   POTASSIUM mmol/L 3 8   < > 4 7   CHLORIDE mmol/L 109*   < > 104   CO2 mmol/L 27   < > 23   BUN mg/dL 8   < > 19   CREATININE mg/dL 0 67   < > 1 06   ANION GAP mmol/L 5   < > 8   CALCIUM mg/dL 8 7 < > 9 3   ALBUMIN g/dL  --   --  4 3   TOTAL BILIRUBIN mg/dL  --   --  0 88   ALK PHOS U/L  --   --  115*   ALT U/L  --   --  36   AST U/L  --   --  45*   GLUCOSE RANDOM mg/dL 95   < > 134    < > = values in this interval not displayed  Results from last 7 days   Lab Units 06/23/23  0059   INR  0 98             Results from last 7 days   Lab Units 06/25/23  0346 06/24/23  0422 06/23/23  0556 06/23/23  0059   LACTIC ACID mmol/L  --   --   --  1 5   PROCALCITONIN ng/ml 0 24 0 48* 0 55* 0 45*       Lines/Drains:  Invasive Devices     Peripheral Intravenous Line  Duration           Peripheral IV 06/26/23 Left;Ventral (anterior) Forearm <1 day                      Imaging: Reviewed radiology reports from this admission including: chest xray    Recent Cultures (last 7 days):   Results from last 7 days   Lab Units 06/23/23  1513 06/23/23  0100 06/23/23  0058   BLOOD CULTURE   --  No Growth at 72 hrs  No Growth at 72 hrs     LEGIONELLA URINARY ANTIGEN  Negative  --   --        Last 24 Hours Medication List:   Current Facility-Administered Medications   Medication Dose Route Frequency Provider Last Rate   • albuterol  2 5 mg Nebulization Q6H PRN Zach Strickland MD     • benzonatate  100 mg Oral TID Zach Strickland MD     • buPROPion  100 mg Oral Daily Khadar Reid PA-C     • cefTRIAXone  1,000 mg Intravenous Q24H Dez Olivarez PA-C 1,000 mg (06/25/23 1322)   • guaiFENesin  600 mg Oral BID Zach Strickland MD     • heparin (porcine)  5,000 Units Subcutaneous [facility] Dez Olivarez PA-C     • metoprolol succinate  25 mg Oral Daily Khadar Reid PA-C     • metroNIDAZOLE  500 mg Intravenous Q8H Zach Strickland  mg (06/26/23 1143)   • ondansetron  4 mg Intravenous Q6H PRN Albaro Randhawa PA-C     • pantoprazole  40 mg Oral Early Morning Khadar Reid PA-C     • polyethylene glycol  17 g Oral Daily Khadar Reid PA-C     • pravastatin  80 mg Oral Daily With Dinner Khadar Reid PA-C          Today, Patient Was Seen By: Keiko Milligan PA-C    **Please Note: This note may have been constructed using a voice recognition system  **

## 2023-06-27 VITALS
RESPIRATION RATE: 18 BRPM | TEMPERATURE: 97.8 F | HEIGHT: 60 IN | WEIGHT: 101.8 LBS | OXYGEN SATURATION: 91 % | HEART RATE: 75 BPM | SYSTOLIC BLOOD PRESSURE: 132 MMHG | BODY MASS INDEX: 19.99 KG/M2 | DIASTOLIC BLOOD PRESSURE: 85 MMHG

## 2023-06-27 PROBLEM — J69.0 ASPIRATION PNEUMONIA (HCC): Status: RESOLVED | Noted: 2023-06-23 | Resolved: 2023-06-27

## 2023-06-27 LAB
ANION GAP SERPL CALCULATED.3IONS-SCNC: 8 MMOL/L
BASOPHILS # BLD AUTO: 0.07 THOUSANDS/ÂΜL (ref 0–0.1)
BASOPHILS NFR BLD AUTO: 1 % (ref 0–1)
BUN SERPL-MCNC: 8 MG/DL (ref 5–25)
CALCIUM SERPL-MCNC: 9 MG/DL (ref 8.4–10.2)
CHLORIDE SERPL-SCNC: 108 MMOL/L (ref 96–108)
CO2 SERPL-SCNC: 24 MMOL/L (ref 21–32)
CREAT SERPL-MCNC: 0.66 MG/DL (ref 0.6–1.3)
EOSINOPHIL # BLD AUTO: 0.73 THOUSAND/ÂΜL (ref 0–0.61)
EOSINOPHIL NFR BLD AUTO: 8 % (ref 0–6)
ERYTHROCYTE [DISTWIDTH] IN BLOOD BY AUTOMATED COUNT: 12.8 % (ref 11.6–15.1)
GFR SERPL CREATININE-BSD FRML MDRD: 104 ML/MIN/1.73SQ M
GLUCOSE SERPL-MCNC: 96 MG/DL (ref 65–140)
HCT VFR BLD AUTO: 42.8 % (ref 36.5–49.3)
HGB BLD-MCNC: 13.9 G/DL (ref 12–17)
IMM GRANULOCYTES # BLD AUTO: 0.05 THOUSAND/UL (ref 0–0.2)
IMM GRANULOCYTES NFR BLD AUTO: 1 % (ref 0–2)
LYMPHOCYTES # BLD AUTO: 1.31 THOUSANDS/ÂΜL (ref 0.6–4.47)
LYMPHOCYTES NFR BLD AUTO: 15 % (ref 14–44)
MCH RBC QN AUTO: 30.5 PG (ref 26.8–34.3)
MCHC RBC AUTO-ENTMCNC: 32.5 G/DL (ref 31.4–37.4)
MCV RBC AUTO: 94 FL (ref 82–98)
MONOCYTES # BLD AUTO: 0.88 THOUSAND/ÂΜL (ref 0.17–1.22)
MONOCYTES NFR BLD AUTO: 10 % (ref 4–12)
NEUTROPHILS # BLD AUTO: 6 THOUSANDS/ÂΜL (ref 1.85–7.62)
NEUTS SEG NFR BLD AUTO: 65 % (ref 43–75)
NRBC BLD AUTO-RTO: 0 /100 WBCS
PLATELET # BLD AUTO: 216 THOUSANDS/UL (ref 149–390)
PMV BLD AUTO: 10.3 FL (ref 8.9–12.7)
POTASSIUM SERPL-SCNC: 3.6 MMOL/L (ref 3.5–5.3)
RBC # BLD AUTO: 4.55 MILLION/UL (ref 3.88–5.62)
SODIUM SERPL-SCNC: 140 MMOL/L (ref 135–147)
WBC # BLD AUTO: 9.04 THOUSAND/UL (ref 4.31–10.16)

## 2023-06-27 PROCEDURE — 99239 HOSP IP/OBS DSCHRG MGMT >30: CPT | Performed by: PHYSICIAN ASSISTANT

## 2023-06-27 PROCEDURE — 80048 BASIC METABOLIC PNL TOTAL CA: CPT | Performed by: PHYSICIAN ASSISTANT

## 2023-06-27 PROCEDURE — 94668 MNPJ CHEST WALL SBSQ: CPT

## 2023-06-27 PROCEDURE — 85025 COMPLETE CBC W/AUTO DIFF WBC: CPT | Performed by: PHYSICIAN ASSISTANT

## 2023-06-27 PROCEDURE — 94640 AIRWAY INHALATION TREATMENT: CPT

## 2023-06-27 PROCEDURE — 94762 N-INVAS EAR/PLS OXIMTRY CONT: CPT

## 2023-06-27 PROCEDURE — 94760 N-INVAS EAR/PLS OXIMETRY 1: CPT

## 2023-06-27 RX ORDER — BENZONATATE 100 MG/1
100 CAPSULE ORAL 3 TIMES DAILY PRN
Qty: 20 CAPSULE | Refills: 0 | Status: SHIPPED | OUTPATIENT
Start: 2023-06-27 | End: 2023-07-03 | Stop reason: ALTCHOICE

## 2023-06-27 RX ORDER — BUPROPION HYDROCHLORIDE 100 MG/1
100 TABLET ORAL DAILY
Start: 2023-06-27

## 2023-06-27 RX ORDER — AMOXICILLIN AND CLAVULANATE POTASSIUM 875; 125 MG/1; MG/1
1 TABLET, FILM COATED ORAL EVERY 12 HOURS SCHEDULED
Qty: 6 TABLET | Refills: 0 | Status: SHIPPED | OUTPATIENT
Start: 2023-06-27 | End: 2023-06-30

## 2023-06-27 RX ORDER — ALBUTEROL SULFATE 2.5 MG/3ML
2.5 SOLUTION RESPIRATORY (INHALATION) EVERY 6 HOURS PRN
Qty: 75 ML | Refills: 0 | Status: SHIPPED | OUTPATIENT
Start: 2023-06-27

## 2023-06-27 RX ORDER — GUAIFENESIN 600 MG/1
600 TABLET, EXTENDED RELEASE ORAL 2 TIMES DAILY
Qty: 14 TABLET | Refills: 0 | Status: SHIPPED | OUTPATIENT
Start: 2023-06-27 | End: 2023-07-03 | Stop reason: SDUPTHER

## 2023-06-27 RX ADMIN — CEFTRIAXONE 1000 MG: 1 INJECTION, SOLUTION INTRAVENOUS at 13:26

## 2023-06-27 RX ADMIN — POLYETHYLENE GLYCOL 3350 17 G: 17 POWDER, FOR SOLUTION ORAL at 09:17

## 2023-06-27 RX ADMIN — PRAVASTATIN SODIUM 80 MG: 80 TABLET ORAL at 15:48

## 2023-06-27 RX ADMIN — GUAIFENESIN 600 MG: 600 TABLET ORAL at 09:18

## 2023-06-27 RX ADMIN — HEPARIN SODIUM 5000 UNITS: 5000 INJECTION INTRAVENOUS; SUBCUTANEOUS at 07:46

## 2023-06-27 RX ADMIN — BUPROPION HYDROCHLORIDE 100 MG: 100 TABLET, FILM COATED ORAL at 09:24

## 2023-06-27 RX ADMIN — BENZONATATE 100 MG: 100 CAPSULE ORAL at 09:17

## 2023-06-27 RX ADMIN — METRONIDAZOLE 500 MG: 5 INJECTION, SOLUTION INTRAVENOUS at 03:46

## 2023-06-27 RX ADMIN — ALBUTEROL SULFATE 2.5 MG: 2.5 SOLUTION RESPIRATORY (INHALATION) at 14:46

## 2023-06-27 RX ADMIN — METOPROLOL SUCCINATE 25 MG: 25 TABLET, EXTENDED RELEASE ORAL at 09:17

## 2023-06-27 RX ADMIN — PANTOPRAZOLE SODIUM 40 MG: 40 TABLET, DELAYED RELEASE ORAL at 07:46

## 2023-06-27 RX ADMIN — METRONIDAZOLE 500 MG: 5 INJECTION, SOLUTION INTRAVENOUS at 12:28

## 2023-06-27 RX ADMIN — BENZONATATE 100 MG: 100 CAPSULE ORAL at 15:48

## 2023-06-27 NOTE — ASSESSMENT & PLAN NOTE
· SPO2 on room air 87 to 88%  · SPO2 stable on 2 L supplemental oxygen via nasal cannula overnight  · Wean oxygen as able  · Patient able to be weaned to RA while awake/with ambulation however still with desats overnight while sleeping  · Overnight pulse ox study completed -patient qualified for O2 nightly arranged by case management

## 2023-06-27 NOTE — NURSING NOTE
IV access removed/intact  Discharge instructions reviewed w/pt and his sister Earnest Cranker  Nebulizer and oxygen paperwork also provided to Earnest Cranker   Patient left with sister 4:30pm

## 2023-06-27 NOTE — DISCHARGE INSTR - AVS FIRST PAGE
Follow-up with PCP within 1 week for posthospitalization follow-up  Continue antibiotic (augmentin) for 3 more days  Can use nebulizer as needed for shortness of breath/wheezing  Continue outpatient physical therapy  Continue to wear oxygen at bedtime    Return to the emergency department for further evaluation with any chest pain/palpitations, worsening shortness of breath, nausea/vomiting, abdominal pain, fever/chills

## 2023-06-27 NOTE — PLAN OF CARE
Problem: Potential for Falls  Goal: Patient will remain free of falls  Description: INTERVENTIONS:  - Educate patient/family on patient safety including physical limitations  - Instruct patient to call for assistance with activity   - Consult OT/PT to assist with strengthening/mobility   - Keep Call bell within reach  - Keep bed low and locked with side rails adjusted as appropriate  - Keep care items and personal belongings within reach  - Initiate and maintain comfort rounds  - Make Fall Risk Sign visible to staff  - Offer Toileting every 2 Hours, in advance of need  - Initiate/Maintain bed/chair alarm  - Obtain necessary fall risk management equipment:   - Apply yellow socks and bracelet for high fall risk patients  - Consider moving patient to room near nurses station  Outcome: Progressing     Problem: Nutrition/Hydration-ADULT  Goal: Nutrient/Hydration intake appropriate for improving, restoring or maintaining nutritional needs  Description: Monitor and assess patient's nutrition/hydration status for malnutrition  Collaborate with interdisciplinary team and initiate plan and interventions as ordered  Monitor patient's weight and dietary intake as ordered or per policy  Utilize nutrition screening tool and intervene as necessary  Determine patient's food preferences and provide high-protein, high-caloric foods as appropriate       INTERVENTIONS:  - Monitor oral intake, urinary output, labs, and treatment plans  - Assess nutrition and hydration status and recommend course of action  - Evaluate amount of meals eaten  - Assist patient with eating if necessary   - Allow adequate time for meals  - Recommend/ encourage appropriate diets, oral nutritional supplements, and vitamin/mineral supplements  - Order, calculate, and assess calorie counts as needed  - Recommend, monitor, and adjust tube feedings and TPN/PPN based on assessed needs  - Assess need for intravenous fluids  - Provide specific nutrition/hydration education as appropriate  - Include patient/family/caregiver in decisions related to nutrition  Outcome: Progressing     Problem: Prexisting or High Potential for Compromised Skin Integrity  Goal: Skin integrity is maintained or improved  Description: INTERVENTIONS:  - Identify patients at risk for skin breakdown  - Assess and monitor skin integrity  - Assess and monitor nutrition and hydration status  - Monitor labs   - Assess for incontinence   - Turn and reposition patient  - Assist with mobility/ambulation  - Relieve pressure over bony prominences  - Avoid friction and shearing  - Provide appropriate hygiene as needed including keeping skin clean and dry  - Evaluate need for skin moisturizer/barrier cream  - Collaborate with interdisciplinary team   - Patient/family teaching  - Consider wound care consult   Outcome: Progressing     Problem: MOBILITY - ADULT  Goal: Maintain or return to baseline ADL function  Description: INTERVENTIONS:  -  Assess patient's ability to carry out ADLs; assess patient's baseline for ADL function and identify physical deficits which impact ability to perform ADLs (bathing, care of mouth/teeth, toileting, grooming, dressing, etc )  - Assess/evaluate cause of self-care deficits   - Assess range of motion  - Assess patient's mobility; develop plan if impaired  - Assess patient's need for assistive devices and provide as appropriate  - Encourage maximum independence but intervene and supervise when necessary  - Involve family in performance of ADLs  - Assess for home care needs following discharge   - Consider OT consult to assist with ADL evaluation and planning for discharge  - Provide patient education as appropriate  Outcome: Progressing  Goal: Maintains/Returns to pre admission functional level  Description: INTERVENTIONS:  - Perform BMAT or MOVE assessment daily    - Set and communicate daily mobility goal to care team and patient/family/caregiver     - Collaborate with rehabilitation services on mobility goals if consulted  - Perform Range of Motion 3 times a day  - Reposition patient every 2 hours    - Dangle patient 3 times a day  - Stand patient 5 times a day  - Ambulate patient 3 times a day  - Out of bed to chair 3 times a day   - Out of bed for meals 3 times a day  - Out of bed for toileting  - Record patient progress and toleration of activity level   Outcome: Progressing     Problem: INFECTION - ADULT  Goal: Absence or prevention of progression during hospitalization  Description: INTERVENTIONS:  - Assess and monitor for signs and symptoms of infection  - Monitor lab/diagnostic results  - Monitor all insertion sites, i e  indwelling lines, tubes, and drains  - Monitor endotracheal if appropriate and nasal secretions for changes in amount and color  - [place] appropriate cooling/warming therapies per order  - Administer medications as ordered  - Instruct and encourage patient and family to use good hand hygiene technique  - Identify and instruct in appropriate isolation precautions for identified infection/condition  Outcome: Progressing  Goal: Absence of fever/infection during neutropenic period  Description: INTERVENTIONS:  - Monitor WBC    Outcome: Progressing     Problem: RESPIRATORY - ADULT  Goal: Achieves optimal ventilation and oxygenation  Description: INTERVENTIONS:  - Assess for changes in respiratory status  - Assess for changes in mentation and behavior  - Position to facilitate oxygenation and minimize respiratory effort  - Oxygen administered by appropriate delivery if ordered  - Initiate smoking cessation education as indicated  - Encourage broncho-pulmonary hygiene including cough, deep breathe, Incentive Spirometry  - Assess the need for suctioning and aspirate as needed  - Assess and instruct to report SOB or any respiratory difficulty  - Respiratory Therapy support as indicated  Outcome: Progressing

## 2023-06-27 NOTE — DISCHARGE SUMMARY
[place]  Discharge- Paris Llanes 1960, 65 y o  male MRN: [id number]  Unit/Bed#: -01 Encounter: [id number]  Primary Care Provider: Anthony Piedra,    Date and time admitted to hospital: 6/23/2023 12:25 AM    * Aspiration pneumonia (HCC)-resolved as of 6/27/2023  Assessment & Plan  · Episode of choking on 6/18 with development of cough 6/21 with worsening 6/22 and development of fever and fatigue  · Given cefepime and vancomycin in the ED -de-escalate to ceftriaxone as patient is from home  · Urine strep and Legionella studies is negative  · Blood cultures negative x2 after 4 days  · Respiratory protocol  · Aspiration precautions  · Airway clearance protocol  · Leukocytosis has resolved  · Continue dysphagia diet per speech therapy  · Transition to Augmentin for discharge    Acute respiratory insufficiency  Assessment & Plan  · SPO2 on room air 87 to 88%  · SPO2 stable on 2 L supplemental oxygen via nasal cannula overnight  · Wean oxygen as able  · Patient able to be weaned to RA while awake/with ambulation however still with desats overnight while sleeping  · Overnight pulse ox study completed -patient qualified for O2 nightly arranged by case management    Sepsis without acute organ dysfunction ([facility] [address] )  Assessment & Plan  · SIRS criteria met on admission: Tachycardia and leukocytosis  · In setting of possible aspiration pneumonia  · No severe sepsis criteria met  · On IV Rocephin  · Leukocytosis resolved  · UA is negative  · Blood cultures are negative to date    Primary hypertension  Assessment & Plan  · Home regimen: metoprolol 25 Mg daily  · Continue Toprol-XL    Depression  Assessment & Plan  · Home regimen: Wellbutrin 100 Mg twice daily  · Continue home medication    Dysphagia  Assessment & Plan  · Sister reports progressive worsening of dysphagia with upcoming speech therapy appointment  · Episode of choking on 6/18  · Speech and swallow evaluation done    Patient underwent video barium swallow and was started on dysphagia 3 with thin liquid    Intellectual disability  Assessment & Plan  · Currently living with his sister with plans to move into a group home once available    GERD (gastroesophageal reflux disease)  Assessment & Plan  · Home regimen: Omeprazole 40 Mg daily and Carafate 1 g twice daily  · Continue home medication    Medical Problems     Resolved Problems  Date Reviewed: 6/27/2023          Resolved    * (Principal) Aspiration pneumonia ([address] ) 6/27/2023     Resolved by  Chata Reed PA-C        Discharging Physician / Practitioner: Chata Reed PA-C  PCP: Umang Dey DO  Admission Date:   Admission Orders (From admission, onward)     Ordered        06/23/23 0221  INPATIENT ADMISSION  Once                      Discharge Date: 06/27/23    Consultations During Hospital Stay:  · Speech therapy  · Case management    Procedures Performed:   · VBS speech -recommending dysphagia 3 with thin liquids    Significant Findings / Test Results:   · CXR upon official read without acute disease  · Leukocytosis on admit of 25 K  · Procalcitonin initially 0 45, down trended prior to discharge  · Urine antigens negative  · Blood cultures negative x2 after 4 days  · UA negative UTI    Incidental Findings:   · None    Test Results Pending at Discharge (will require follow up): · None     Outpatient Tests Requested:  · None    Complications: None    Reason for Admission: Sepsis due to pneumonia    Hospital Course:   Thais Lynch is a 65 y o  male patient who originally presented to the hospital on 6/23/2023 due to fever and fatigue, low oxygen level  Past medical history significant for intellectual disability, hypertension, GERD, gradually worsening dysphagia  Patient had been noted to have a fever of 101 and worsening cough  There was concern for possible aspiration event    Patient was found to have leukocytosis, tachycardia meeting sepsis criteria in setting of "pneumonia  Initially patient required 2 L nasal cannula continuously on admission  Cultures were negative to date  Patient was started on IV antibiotics  Leukocytosis gradually down trended and patient was able to be weaned off of RA during the day and with ambulation  Patient was noted to desat overnight therefore overnight pulse ox was obtained and patient did qualify for oxygen nightly  Nighttime oxygen arranged by case management  Patient to follow-up with physical therapy as an outpatient  Will transition to oral Augmentin on discharge to complete antibiotic course  On day of discharge patient was hemodynamically stable, afebrile with SPO2 stable on RA  Patient's sister verbalized understanding for requested outpatient follow-up  Please see above list of diagnoses and related plan for additional information  Condition at Discharge: stable    Discharge Day Visit / Exam:   Subjective: Feels well  No complaints  Vitals: Blood Pressure: 113/62 (06/27/23 1157)  Pulse: 72 (06/27/23 1157)  Temperature: 98 °F (36 7 °C) (06/27/23 1157)  Temp Source: Axillary (06/27/23 0236)  Respirations: 18 (06/27/23 1157)  Height: 4' 11\" (149 9 cm) (06/23/23 0820)  Weight - Scale: 46 2 kg (101 lb 12 8 oz) (06/23/23 0820)  SpO2: 94 % (06/27/23 1157)  Exam:   Physical Exam  Vitals and nursing note reviewed  Constitutional:       General: He is not in acute distress  Appearance: He is well-developed  Comments: No acute distress   HENT:      Head: Normocephalic and atraumatic  Eyes:      General: No scleral icterus  Extraocular Movements: Extraocular movements intact  Conjunctiva/sclera: Conjunctivae normal    Cardiovascular:      Rate and Rhythm: Normal rate and regular rhythm  Heart sounds: No murmur heard  Pulmonary:      Effort: Pulmonary effort is normal  No respiratory distress        Comments: Central airway rhonchi that clears with cough  Abdominal:      General: Bowel sounds are " normal       Palpations: Abdomen is soft  Tenderness: There is no abdominal tenderness  There is no guarding or rebound  Musculoskeletal:         General: No swelling  Cervical back: Normal range of motion  Comments: Able to move upper/lower extremities bilaterally, no edema   Skin:     General: Skin is warm and dry  Capillary Refill: Capillary refill takes less than 2 seconds  Neurological:      Mental Status: He is alert  Mental status is at baseline  Psychiatric:         Mood and Affect: Mood normal           Discussion with Family: Updated  (sister) at bedside  Discharge instructions/Information to patient and family:   See after visit summary for information provided to patient and family  Provisions for Follow-Up Care:  See after visit summary for information related to follow-up care and any pertinent home health orders  Disposition:   Home    Planned Readmission: None     Discharge Statement:  I spent 60 minutes discharging the patient  This time was spent on the day of discharge  I had direct contact with the patient on the day of discharge  Greater than 50% of the total time was spent examining patient, answering all patient questions, arranging and discussing plan of care with patient as well as directly providing post-discharge instructions  Additional time then spent on discharge activities  Discharge Medications:  See after visit summary for reconciled discharge medications provided to patient and/or family        **Please Note: This note may have been constructed using a voice recognition system**

## 2023-06-27 NOTE — ASSESSMENT & PLAN NOTE
· Episode of choking on 6/18 with development of cough 6/21 with worsening 6/22 and development of fever and fatigue  · Given cefepime and vancomycin in the ED -de-escalate to ceftriaxone as patient is from home  · Urine strep and Legionella studies is negative  · Blood cultures negative x2 after 4 days  · Respiratory protocol  · Aspiration precautions  · Airway clearance protocol  · Leukocytosis has resolved  · Continue dysphagia diet per speech therapy  · Transition to Augmentin for discharge

## 2023-06-27 NOTE — CASE MANAGEMENT
Case Management Discharge Planning Note    Patient name Micheal Hallmark  Location /-48 MRN [id number]  : 1960 Date 2023       Current Admission Date: 2023  Current Admission Diagnosis:Intellectual disability   Patient Active Problem List    Diagnosis Date Noted   • Dysphagia 2023   • Depression 2023   • Primary hypertension 2023   • Sepsis without acute organ dysfunction ([address] ) 2023   • Acute respiratory insufficiency 2023   • Intellectual disability 2023   • Sakshi Sprout Syndrome 2023   • Neuroendocrine carcinoma ([address] ) 2023   • Kyphoscoliosis and scoliosis 2023   • Recurrent major depressive disorder ([facility] [address] ) 10/04/2022   • Abnormal gait 2021   • Sensorineural hearing loss 2019   • Urinary incontinence 2018   • Anxiety 11/15/2016   • Allergic rhinitis 10/14/2016   • BPH (benign prostatic hyperplasia) 10/14/2016   • GERD (gastroesophageal reflux disease) 10/14/2016   • Mixed hyperlipidemia 10/14/2016   • Other psoriasis 10/14/2016   • Neuroendocrine tumor of pancreas 2014      LOS (days): 4  Geometric Mean LOS (GMLOS) (days): 5 00  Days to GMLOS:0 5     OBJECTIVE:  Risk of Unplanned Readmission Score: 9 2         Current admission status: Inpatient   Preferred Pharmacy:   [facility] [address] [place] [address]  Phone: [phone] Fax: [phone]    Primary Care Provider: Uriah Valdes DO    Primary Insurance: MEDICARE  Secondary Insurance:     DISCHARGE DETAILS:      DME Referral Provided  Referral made for DME?: Yes  DME referral completed for the following items[de-identified] Home Oxygen concentrator, Nebulizer  DME Supplier Name[de-identified] [employer]    Other Referral/Resources/Interventions Provided:  Interventions: DME  Referral Comments: Referral to [facility]til[facility] for a nebulizer and nocturnal 02    Treatment Team Recommendation: Home  Discharge Destination Plan[de-identified] Home  Transport at Discharge : Family     Additional Comments: CM was informed that pt will need nocturnal 02 and a nebulizer  [facility]; order accepted  Nebulizer delivered  Pt's sister Fiona Hernandez states [facility]  Fiona Hernandez to transport home after 4pm  CM notified pt's bedside nurse Daniela of same

## 2023-06-28 ENCOUNTER — TRANSITIONAL CARE MANAGEMENT (OUTPATIENT)
Dept: FAMILY MEDICINE CLINIC | Facility: CLINIC | Age: 63
End: 2023-06-28

## 2023-06-28 LAB
BACTERIA BLD CULT: NORMAL
BACTERIA BLD CULT: NORMAL

## 2023-06-30 LAB
DME PARACHUTE DELIVERY DATE ACTUAL: NORMAL
DME PARACHUTE DELIVERY DATE REQUESTED: NORMAL
DME PARACHUTE DELIVERY NOTE: NORMAL
DME PARACHUTE ITEM DESCRIPTION: NORMAL
DME PARACHUTE ORDER STATUS: NORMAL
DME PARACHUTE SUPPLIER NAME: NORMAL
DME PARACHUTE SUPPLIER PHONE: NORMAL

## 2023-07-03 ENCOUNTER — OFFICE VISIT (OUTPATIENT)
Dept: FAMILY MEDICINE CLINIC | Facility: CLINIC | Age: 63
End: 2023-07-03
Payer: MEDICARE

## 2023-07-03 VITALS
SYSTOLIC BLOOD PRESSURE: 124 MMHG | BODY MASS INDEX: 19.59 KG/M2 | RESPIRATION RATE: 15 BRPM | HEART RATE: 83 BPM | HEIGHT: 60 IN | OXYGEN SATURATION: 99 % | DIASTOLIC BLOOD PRESSURE: 82 MMHG | TEMPERATURE: 97.9 F | WEIGHT: 99.8 LBS

## 2023-07-03 DIAGNOSIS — M41.9 KYPHOSCOLIOSIS AND SCOLIOSIS: ICD-10-CM

## 2023-07-03 DIAGNOSIS — F79 INTELLECTUAL DISABILITY: ICD-10-CM

## 2023-07-03 DIAGNOSIS — R06.89 ACUTE RESPIRATORY INSUFFICIENCY: ICD-10-CM

## 2023-07-03 DIAGNOSIS — G25.81 RESTLESS LEG: ICD-10-CM

## 2023-07-03 DIAGNOSIS — T17.900S PULMONARY ASPIRATION, SEQUELA: Primary | ICD-10-CM

## 2023-07-03 DIAGNOSIS — K21.9 GASTROESOPHAGEAL REFLUX DISEASE, UNSPECIFIED WHETHER ESOPHAGITIS PRESENT: ICD-10-CM

## 2023-07-03 DIAGNOSIS — R09.02 HYPOXIA: ICD-10-CM

## 2023-07-03 DIAGNOSIS — R13.10 DYSPHAGIA, UNSPECIFIED TYPE: ICD-10-CM

## 2023-07-03 PROBLEM — T17.900A INHALATION OF LIQUID OR VOMITUS, LOWER RESPIRATORY TRACT: Status: ACTIVE | Noted: 2023-07-03

## 2023-07-03 PROCEDURE — 99496 TRANSJ CARE MGMT HIGH F2F 7D: CPT | Performed by: FAMILY MEDICINE

## 2023-07-03 RX ORDER — GUAIFENESIN 600 MG/1
600 TABLET, EXTENDED RELEASE ORAL 2 TIMES DAILY
Qty: 180 TABLET | Refills: 3 | Status: SHIPPED | OUTPATIENT
Start: 2023-07-03

## 2023-07-03 RX ORDER — IPRATROPIUM BROMIDE AND ALBUTEROL SULFATE 2.5; .5 MG/3ML; MG/3ML
3 SOLUTION RESPIRATORY (INHALATION) 2 TIMES DAILY
Qty: 360 ML | Refills: 3 | Status: SHIPPED | OUTPATIENT
Start: 2023-07-03 | End: 2023-07-03

## 2023-07-03 NOTE — PROGRESS NOTES
Assessment & Plan     HPI  Patient came in with a temperature of 101 and a cough and history of gradually worsening dysphagia, and a drop consistently of pulse ox of mid 80's. Concern for aspiration which he had done in the past  Patient had leukocytosis and tachycardia meeting sepsis criteria in the setting of pneumonia  Blood cultures negative  Treated with antibiotics with a gradual decrease in leukocytosis  Patient weaned from oxygen for the daytime but continue nighttime requirement because of desaturating  To follow-up with physical therapy as outpatient  Sent home on oral Augmentin  Sister who is his power of  understood  Patient and sister also mention restless leg especially the right leg at bedtime    Assessment plan    Aspiration pneumonia  Blood cultures negative  Augmentin for the rest of the course  Chest x-ray negative  Keep oxygen nightly  Add neb Duoneb bid    Kyphosis and scoliosis causing mechanical decrease in lung volumes  Also eating and probably microscopic aspiration  Also causing hypoxia  Continue oxygen always at bedtime  Use nebulizer at least twice a day every day to help open his lungs and dry secretions  Can be used 4 times a day if needed    Dysphagia  Sister reports worsening and does have a speech therapy appointment  Patient had barium swallow, not a lot of aspiraion  Eat by himself so he does not get excited and aspirate and choke. Continue omeprazole    Acute respiratory insufficiency  Room air 88%, supplemental oxygen at night at 2 L  Continue the same    Restless leg  Tums Ultra every night at dinnertime    Hypertension  Continue Toprol  124/82    Depression  Bupropion daily           Subjective     Transitional Care Management Review:   Benedetta Mcburney is a 65 y.o. male here for TCM follow up.      During the TCM phone call patient stated:  TCM Call     Date and time call was made  6/28/2023  1:44 PM    Hospital care reviewed  Records reviewed    Patient was hospitialized at  [address] [place] [address] [place]    Date of Admission  06/23/23    Date of discharge  06/27/23    Diagnosis  Aspiration pneumonia    Disposition  Home    Were the patients medications reviewed and updated  Yes    Current Symptoms  None      TCM Call     Post hospital issues  None    Should patient be enrolled in anticoag monitoring?   No    I have advised the patient to call PCP with any new or worsening symptoms  abi cantrell ma        HPI  Review of Systems    Objective     /82   Pulse 83   Temp 97.9 °F (36.6 °C) (Oral)   Resp 15   Ht 4' 6.75" (1.391 m)   Wt 45.3 kg (99 lb 12.8 oz)   SpO2 99%   BMI 23.41 kg/m²      Physical Exam    Constitutional  Appears healthy, Looks well, Appearance consistent with age    Mental Status  Alert, Oriented, Cooperative, Memory function normal , clean, and reasonable    Neck  No neck mass, No thyromegaly, Good carotid upstrokes bilaterally, trachea midline positive click    Respiratory  Gross kyphoscoliosis where he has total concavity of the back chest left and convexity of the back chest right  Visual breath sounds always show rhonchi anteriorly which she can cough and clear  Once this is done he is clear to auscultation    Cardiac  Regular rhythm without ectopy or murmur no S3-S4, no heave lift or thrill to palpation    Vascular  No leg edema, No pedal edema    Muscular skeletal  No clubbing cyanosis , muscle tone normal    Skin  No appreciable rashes or abnormal appearing lesions      Fly Zendejas, DO

## 2023-07-03 NOTE — PATIENT INSTRUCTIONS
Keep oxygen on and use it every bedtime    Keep Mucinex 1 twice a day with 8 ounces of water to make the mucus thin so it is easy to cough out    Eat alone not to be distracted and prevent aspiration    Begin nebulizer treatments with DuoNeb to help open his lungs and also dry up any secretions

## 2023-07-17 ENCOUNTER — EVALUATION (OUTPATIENT)
Dept: SPEECH THERAPY | Facility: CLINIC | Age: 63
End: 2023-07-17
Payer: MEDICARE

## 2023-07-17 DIAGNOSIS — R13.12 OROPHARYNGEAL DYSPHAGIA: Primary | ICD-10-CM

## 2023-07-17 DIAGNOSIS — F80.9 SPEECH DISORDER DEVELOPMENTAL: ICD-10-CM

## 2023-07-17 PROCEDURE — 92610 EVALUATE SWALLOWING FUNCTION: CPT

## 2023-07-17 PROCEDURE — 92526 ORAL FUNCTION THERAPY: CPT

## 2023-07-17 NOTE — PROGRESS NOTES
Speech-Language Pathology Initial Evaluation    Today's date: 2023   Patient’s name: Micheal Mcclendon  : 1960  MRN: [id number]  Safety measures:   Referring provider: DO Candelaria Devries Diagnosis     ICD-10-CM    1. Speech disorder developmental  F80.9 Ambulatory Referral to Speech Therapy        Visit tracking:  -Referring provider: Epic  -Billing guidelines: CMS  -Visit # 1  -Insurance: Medicare  -RE due: 23    Subjective comments:  Pleasant/Cooperative    How did the patient hear about us? Physician    Patient's goal(s):  Eat regular food and stay out of the hospital.    Reason for referral: Difficulty swallowing solids or liquids  Prior functional status: Developmental delay/disorder  Clinically complex situations: Discharge from SNF or Hospital in the last 30 days    History: Patient is a 65 y.o. male who was referred to outpatient skilled Speech Therapy services for a dysphagia evaluation. Patient with recent hospital admission for Aspiration Pneumonia. 23: Pt presents w/ mild oropharyngeal dysphagia rowena by prolonged anterior mastication, reduced oral control w/ anterior loss and premature posterior spill, and variable swallow delay. Mild reduced anterior hyoid excursion. Complete laryngeal elevation and laryngeal vestibule closure. Inconsistent epiglottic inversion, at times w/ curled tip remaining and subsequent vallecular retention. No penetration/aspiration on today's study. Suspect choking incidents 2/2 impulsivity/rapid intake, pt requires consistent cues for slow rate. Of note, esophageal retention w/ backflow visualized several times throughout study - cough often associated w/ backflow.      VBS findings and recommendations immediately reviewed w/ pt and pt's sister w/ use of images to aid in understanding.  Education provided on strategies to optimize swallow safety, including the importance of oral care and s/s aspiration to monitor for and notify medical team of should they arise. Pt verbalized understanding and denied questions at this time.     Recommendations:  Diet: Dysphagia 3   Liquids: Thin   Meds: Whole w/ liquid   Strategies: Slow rate   Frequent oral care  Upright position  F/u ST tx: Yes   Therapy Prognosis: Good   Prognosis considerations: Age, past medical, current medical   Close Supervision  Aspiration Precautions  Reflux Precautions  Consider consult with: -   Results reviewed with: pt, nursing, family, physician   Aspiration precautions posted. Repeat MBS as necessary  If a dedicated assessment of the esophagus is desired, consider esophagram/barium swallow or EGD.          Hearing: WFL  Vision: glasses    Home environment/lifestyle: Lives with younger sisterMacy. Highest level of education: Goes to [employer], [employer]. Mental status: Alert  Behavior status: Cooperative  Patient reported symptoms of: depression  Communication modalities: Verbal  Rehabilitation prognosis: Good rehab potential to reach the established goals        Assessments        DYSPHAGIA EVALUATION:    -Reason for referral: Signs/symptoms of dysphagia and History of aspiration pneumonia    -Subjective report of swallowing difficulty: Coughing    -Eating Assessment Tool (EAT-10) Swallowing Screening Tool is a patient self-assessment tool that rates the percieved impairment a swallowing disorder has on the Functional, Physical, and Emotional aspects of one's life. EAT-10 score: 19/40    -Difficulty swallowing: Solids, Liquids    -Current diet (solids): Regular - small bites, slow rate  -Current diet (liquids):  Thin  -Current pill intake method: with liquid  -Alternative Feeding Method?: No    -Facial appearance Symmetrical   -Dentition Adequate   -Labial function Decreased strength (bilateral) and Decreased coordination   -Lingual function Decreased strength (bilateral) and Decreased coordination   -Velar function Unable to visualize       LIQUID CONSISTENCY TESTIN. Liquid Consistency (Thin) - water   • Administered by: Straw and Self-fed  • Strategies, attempts, and responses: None    CLINICAL FINDINGS:  • Oral phase impairments: Anterior-posterior transit, Bolus formation/control and Premature spillage  • Pharyngeal Phase Impairments: Swallow initiation Mild delay    *Laryngeal excursion upon palpation: Appeared [facility] -  Noted mild anterior hyoid excursion per VFSS. Complete laryngeal elevation and vestibular closure per VFSS. SOLID CONSISTENCY TESTING:  • 1. Solid Consistency (Regular, Mechanical Soft, Mixed and Puree) -   • Administered by: Spoon and Self-fed  • Strategies, attempts, and responses: None    CLINICAL FINDINGS:  • Oral phase impairments: Anterior-posterior transit, Bolus formation/control, Premature spillage, Stasis/coating/ pocketing and Piecemeal deglutition  • Pharyngeal Phase Impairments: Swallow initiation Mild delay    *Laryngeal excursion upon palpation: Appeared [facility]    Goals      Short Term    Patient will complete exercises to strengthen hyoid excursion & epiglottic inversion with instruction & education with assistance from a support person or SLP. Patient will complete daily oral motor exercises (with and without IOPI bulb) to increase lingual/labial range of motion, strength, and coordination with min cues to 90% effectiveness to improve bolus formation and strengthen oral musculature. Patient will perform compensatory strategies (e.g., upright positioning, small bites/sips, slow rate) with 80% accuracy to eliminate overt s/sx penetration/aspiration of least restrictive food/liquid consistencies. Patient will tolerate regular / thin liquid diet with minimized risk of aspiration & no reported signs of aspiration during session and at home by younger sister. Patient / caregiver will accept understanding of implementation of swallowing/breathing and expiratory resistance / strengthening device, EMST-75. Long Term      Patient will utilize compensatory strategies with optimum safety and efficacy of swallowing function on P.O. intake without overt s/sx of penetration or aspiration by discharge. Patient will develop safe and functional chewing and swallowing via use of dysphagia management strategies and diet modifications for adequate meal completion without significant s/sx of dysphagia and aspiration in order to maximize quality of life and to decrease potential for medical complications for dysphagia. Impressions/Recommendations    Impressions:   -Patient presents with mild oral/pharyngeal dysphagia    -Factors affecting performance: Mental Status, Following directions and Endurance    -Safety concerns: Risk for aspiration, Risk for choking and Risk for inadequate nutrition/ hydration    -Risk factors: Impaired cognitive status/ dementia    Recommendations:  -Patient would benefit from outpatient skilled Speech Therapy services: Dysphagia therapy    -Frequency: 1-2x weekly  -Duration: 6-8 weeks    -Intervention certification from: 7/00/1232  -Intervention certification to: 2/82/3782    -Intervention comments:       SWALLOWING SAFETY PRECAUTIONS:  -Recommended solids: Regular    -Recommended liquids:  Thin    -Recommended medication form: with liquid    -Frequent/thorough oral care     -Aspiration precautions   *Monitor for signs/symptoms concerning for aspiration (e.g., low grade fever, increase in WBC, change in chest x-ray, increased congestion, increased coughing with P.O. intake)    -Reflux precautions     -Strategies: Small sips and bites when eating and Slow rate, swallow between bites    -Positioning: Upright position during meals and Upright position at least 30 minutes after P.O. intake      -Supervision: Other: Distant    -Referrals: None

## 2023-07-19 ENCOUNTER — EVALUATION (OUTPATIENT)
Dept: PHYSICAL THERAPY | Facility: CLINIC | Age: 63
End: 2023-07-19
Payer: MEDICARE

## 2023-07-19 DIAGNOSIS — R26.89 BALANCE DISORDER: Primary | ICD-10-CM

## 2023-07-19 DIAGNOSIS — R26.2 AMBULATORY DYSFUNCTION: ICD-10-CM

## 2023-07-19 PROCEDURE — 97162 PT EVAL MOD COMPLEX 30 MIN: CPT | Performed by: PHYSICAL THERAPIST

## 2023-07-19 NOTE — PROGRESS NOTES
PT Evaluation     Today's date: 2023  Patient name: Micheal Mcclendon  : 1960  MRN: [id number]  Referring provider: Magnolia Pollack PA-C  Dx:   Encounter Diagnosis     ICD-10-CM    1. Balance disorder  R26.89       2. Ambulatory dysfunction  R26.2 Ambulatory referral to Physical Therapy                     Assessment  Assessment details: Patient is a 65 y.o. male who presents to outpatient PT with   decreased strength, poor endurance, poor balance and decreased functional mobility. He will benefit from skilled PT to address these deficits in order to achieve his goals and maximize his functional mobility. Goals  Short Term Goals: Independent performance of initial hep  Decrease pain 2 points on VAS      Long Term Goals: Independent performance of comprehensive hep  Performance of IADL's is returned to max level of function  Performance in recreational activities is improved to max level of function  Decreased risk of falls    Plan  Planned therapy interventions: abdominal trunk stabilization, postural training, stretching, strengthening, balance, gait training, therapeutic activities, therapeutic exercise, transfer training, IADL retraining and home exercise program  Frequency: 1x week  Duration in weeks: 6        Subjective Evaluation    History of Present Illness  Mechanism of injury: Subjective report provided by patient and  his sister. Reports that he was recently hospitalized for pnuemonia. Reports that during this time he noticed a sig reduction in his strength and endurance. Reports that his activity level has been decreased since moving out of a group home. Reports that he has experienced occasional falls that are the result of tripping. Denies dizziness. Report walking tolerance of 10-15 minutes. Reports that he is having increased difficulty with sit to stand transfers. Reports that he has a stationary bike and is able to cycle for 10 minutes comfortably.  He is also currently being tx by speech therapist.      History of bert syndrome, scoliosis, HTN  Patient Goals  Patient goals for therapy: decreased pain, increased motion, increased strength, independence with ADLs/IADLs and return to sport/leisure activities    Pain  Current pain ratin  At worst pain ratin          Objective     SPO2: 98%    Balance:    FT EO: 30 sec  FT EC: 30 sec increased sway  FT EO foam: 12 sec  FT EC foam 5 sec  Tandem stance unable  Single leg stance 4 sec L 3 sec R  Ambulates with decreased hip/knee flexion during swing, uneven step length and width      TU seconds( >14 seconds indicates increased risk of falls)    5x sit to stand: 13 seconds (>12 seconds indicates increased risk of falls)    Strength:    Hip:  Flexion 4/5  Abduction: 4-/5  Ext: 4-/5  ER: 4/5  IR: 4+/5      Knee:  Flexion: 4/5  Extension: 4/5      Ankle:  DF: 4-/5  PF: 4-/5             Precautions: fall risk, scoliosis, HTN      Manuals                                                                 Neuro Re-Ed             sidestepping             sls             FT EO foam                                                                 Ther Ex             Sit to stand             slr x3             rows             Lateral step up and over             Heel walk             Toe walk                                       Ther Activity             Recumbent bike                          Gait Training                                       Modalities

## 2023-07-26 ENCOUNTER — OFFICE VISIT (OUTPATIENT)
Dept: PHYSICAL THERAPY | Facility: CLINIC | Age: 63
End: 2023-07-26
Payer: MEDICARE

## 2023-07-26 DIAGNOSIS — R26.2 AMBULATORY DYSFUNCTION: Primary | ICD-10-CM

## 2023-07-26 PROCEDURE — 97110 THERAPEUTIC EXERCISES: CPT | Performed by: PHYSICAL THERAPIST

## 2023-07-26 PROCEDURE — 97112 NEUROMUSCULAR REEDUCATION: CPT | Performed by: PHYSICAL THERAPIST

## 2023-07-26 NOTE — PROGRESS NOTES
Daily Note     Today's date: 2023  Patient name: Renita Beth  : 1960  MRN: [id number]  Referring provider: Brady Garcia PA-C  Dx:   Encounter Diagnosis     ICD-10-CM    1. Ambulatory dysfunction  R26.2                      Subjective: pt reports compliance with his hep and that he is fatigued afterward. Pt will be on vacation to [facility] may next week. Objective: See treatment diary below      Assessment: initiated tx as listed. Pt SpO2 dropped to 92% after lateral step ups but recovers to 98% after 1 min of setting. Pt reports fatigue t/o tx but otherwise has good tx. Plan: Continue per plan of care.       Precautions: fall risk, scoliosis, HTN      Manuals                                                                 Neuro Re-Ed             sidestepping On balance beam x8            sls             FT EC foam 1'            Tandem walking 4 laps                                                   Ther Ex             Sit to stand             slr x3             rows Green tb x20            Lateral step up and over 4"x20            Heel walk 4 laps            Toe walk 4 laps            laq 3#x20            Hs curls 3#x20            Biceps curls 3#x20            Ther Activity             Recumbent bike                          Gait Training                                       Modalities

## 2023-07-28 ENCOUNTER — OFFICE VISIT (OUTPATIENT)
Dept: SPEECH THERAPY | Facility: CLINIC | Age: 63
End: 2023-07-28
Payer: MEDICARE

## 2023-07-28 DIAGNOSIS — F80.9 SPEECH DISORDER DEVELOPMENTAL: Primary | ICD-10-CM

## 2023-07-28 DIAGNOSIS — R13.12 OROPHARYNGEAL DYSPHAGIA: ICD-10-CM

## 2023-07-28 PROCEDURE — 92526 ORAL FUNCTION THERAPY: CPT

## 2023-07-28 NOTE — PROGRESS NOTES
Daily Speech Treatment Note    Today's date: 2023  Patient’s name: Monse Condon  : 1960  MRN: [id number]  Safety measures:   Referring provider: DO Candelaria Hdz Diagnosis     ICD-10-CM    1. 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 Oropharyngeal dysphagia  R13.12         Visit trackin    Subjective/Behavioral:  - Pleasant/Cooperative    Objective/Assessment:  Completed structured activities with patient to target goals below. Results as stated below. Short-term goals:       Patient will complete exercises to strengthen hyoid excursion & epiglottic inversion with instruction & education with assistance from a support person or SLP.       Patient will complete daily oral motor exercises (with and without IOPI bulb) to increase lingual/labial range of motion, strength, and coordination with min cues to 90% effectiveness to improve bolus formation and strengthen oral musculature. 23:  Strength and Endurance Testing: The IOPI Pro is used by medical professionals to measure, evaluate, and increase the strength and endurance of the tongue and lip in patients with oral motor disorders, including dysphagia and dysarthria. The IOPI measures the maximum pressure (Pmax) a patient can produce in an air-filled bulb when it is compressed as hard as possible by the tongue or lip against a hard surface (e.g. The palate or teeth, respectively). Pmax is a measure of strength, expressed in kilopascals (kPa, an international unit of pressure). For patients with dysphagia or dysarthria, oral motor fatigability may be of interest.  The IOPI Pro can be used to assess tongue fatigability. Low endurance values are an indicator of a high fatigability. Endurance is measured with the IOPI Pro by quantifying the length of time that a patient can maintain 50% of his or her Pmax.   This procedure is conducted in Target Mode by setting the target value to 50% of the patient's Pmax and timing how long the patient can hold the top (green) light on. The medical professional determines what target value is appropriate for exercise therapy purposes and provides specific instructions to the patient for a particular exercise protocol. In Target Mode, the pressure required to illuminate the green light a the top of the light array can be adjusted using the Set Target arrow buttons. This green light is used as a visual target for the patient. Tongue tip Peak Max after 3 trials: 10, completed level 6 x 13 Norm for gender is 63   Tongue back Peak Max after 3 trials: 7, completed level 5 x 10 Norm for gender is 5-10% lower than anterior tongue   Right lip Peak Max after 3 trials: 16, completed level 10 x 10 Norm for gender is 35   Left lip Peak Max after 3 trials: 16, completed level 10 x 10 Norm for gender is 28        Patient will perform compensatory strategies (e.g., upright positioning, small bites/sips, slow rate) with 80% accuracy to eliminate overt s/sx penetration/aspiration of least restrictive food/liquid consistencies.      Patient will tolerate regular / thin liquid diet with minimized risk of aspiration & no reported signs of aspiration during session and at home by younger sister.     Patient / caregiver will accept understanding of implementation of swallowing/breathing and expiratory resistance / strengthening device, EMST-75.         Long Term        Patient will utilize compensatory strategies with optimum safety and efficacy of swallowing function on P.O. intake without overt s/sx of penetration or aspiration by discharge.     Patient will develop safe and functional chewing and swallowing via use of dysphagia management strategies and diet modifications for adequate meal completion without significant s/sx of dysphagia and aspiration in order to maximize quality of life and to decrease potential for medical complications for dysphagia.              Plan:  -Continue with current plan of care.

## 2023-08-07 ENCOUNTER — OFFICE VISIT (OUTPATIENT)
Dept: SPEECH THERAPY | Facility: CLINIC | Age: 63
End: 2023-08-07
Payer: MEDICARE

## 2023-08-07 DIAGNOSIS — F80.9 SPEECH DISORDER DEVELOPMENTAL: Primary | ICD-10-CM

## 2023-08-07 DIAGNOSIS — R13.12 OROPHARYNGEAL DYSPHAGIA: ICD-10-CM

## 2023-08-07 PROCEDURE — 92526 ORAL FUNCTION THERAPY: CPT

## 2023-08-07 NOTE — PROGRESS NOTES
Daily Speech Treatment Note    Today's date: 2023  Patient’s name: Ching Salinas  : 1960  MRN: [id number]  Safety measures:   Referring provider: Pauline Pierre DO    Encounter Diagnosis     ICD-10-CM    1. 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 Oropharyngeal dysphagia  R13.12         Visit tracking:  3    Subjective/Behavioral:  - Pleasant/Cooperative    Objective/Assessment:  Completed structured activities with patient to target goals below. Results as stated below. Short-term goals:       Patient will complete exercises to strengthen hyoid excursion & epiglottic inversion with instruction & education with assistance from a support person or SLP.  : Educated and demonstrated exercises: effortful exercise and straw sucking: to increase epiglottic inversion.     Patient will complete daily oral motor exercises (with and without IOPI bulb) to increase lingual/labial range of motion, strength, and coordination with min cues to 90% effectiveness to improve bolus formation and strengthen oral musculature. 23: Completed tongue strengthening exercises with resistance / tongue depressor, minimal resistance initiated. Strength and Endurance Testing: The IOPI Pro is used by medical professionals to measure, evaluate, and increase the strength and endurance of the tongue and lip in patients with oral motor disorders, including dysphagia and dysarthria. The IOPI measures the maximum pressure (Pmax) a patient can produce in an air-filled bulb when it is compressed as hard as possible by the tongue or lip against a hard surface (e.g. The palate or teeth, respectively). Pmax is a measure of strength, expressed in kilopascals (kPa, an international unit of pressure). For patients with dysphagia or dysarthria, oral motor fatigability may be of interest.  The IOPI Pro can be used to assess tongue fatigability. Low endurance values are an indicator of a high fatigability.   Endurance is measured with the IOPI Pro by quantifying the length of time that a patient can maintain 50% of his or her Pmax. This procedure is conducted in Target Mode by setting the target value to 50% of the patient's Pmax and timing how long the patient can hold the top (green) light on. The medical professional determines what target value is appropriate for exercise therapy purposes and provides specific instructions to the patient for a particular exercise protocol. In Target Mode, the pressure required to illuminate the green light a the top of the light array can be adjusted using the Set Target arrow buttons. This green light is used as a visual target for the patient. Tongue tip Peak Max after 3 trials: 10, completed level 6 x 12 Norm for gender is 63   Tongue back Peak Max after 3 trials: 7, completed level 5 x 10- could not complete today on 8/7/23 Norm for gender is 5-10% lower than anterior tongue   Right lip Peak Max after 3 trials: 16, completed level 10 x 0. - unable to complete. Norm for gender is 35   Left lip Peak Max after 3 trials: 16, completed level 10 x 10. Norm for gender is 28        Patient will perform compensatory strategies (e.g., upright positioning, small bites/sips, slow rate) with 80% accuracy to eliminate overt s/sx penetration/aspiration of least restrictive food/liquid consistencies. 8/7: Stated strategies that patient uses for swallowing and no overt signs / symptoms of aspiration noted.       Patient will tolerate regular / thin liquid diet with minimized risk of aspiration & no reported signs of aspiration during session and at home by younger sister.     Patient / caregiver will accept understanding of implementation of swallowing/breathing and expiratory resistance / strengthening device, EMST-75.         Long Term        Patient will utilize compensatory strategies with optimum safety and efficacy of swallowing function on P.O. intake without overt s/sx of penetration or aspiration by discharge.     Patient will develop safe and functional chewing and swallowing via use of dysphagia management strategies and diet modifications for adequate meal completion without significant s/sx of dysphagia and aspiration in order to maximize quality of life and to decrease potential for medical complications for dysphagia.              Plan:  -Continue with current plan of care.

## 2023-08-09 ENCOUNTER — OFFICE VISIT (OUTPATIENT)
Dept: PHYSICAL THERAPY | Facility: CLINIC | Age: 63
End: 2023-08-09
Payer: MEDICARE

## 2023-08-09 DIAGNOSIS — R26.2 AMBULATORY DYSFUNCTION: Primary | ICD-10-CM

## 2023-08-09 PROCEDURE — 97112 NEUROMUSCULAR REEDUCATION: CPT

## 2023-08-09 PROCEDURE — 97110 THERAPEUTIC EXERCISES: CPT

## 2023-08-10 ENCOUNTER — OFFICE VISIT (OUTPATIENT)
Dept: FAMILY MEDICINE CLINIC | Facility: CLINIC | Age: 63
End: 2023-08-10
Payer: MEDICARE

## 2023-08-10 ENCOUNTER — TELEPHONE (OUTPATIENT)
Dept: FAMILY MEDICINE CLINIC | Facility: CLINIC | Age: 63
End: 2023-08-10

## 2023-08-10 VITALS
DIASTOLIC BLOOD PRESSURE: 78 MMHG | WEIGHT: 103.4 LBS | BODY MASS INDEX: 20.3 KG/M2 | OXYGEN SATURATION: 99 % | RESPIRATION RATE: 18 BRPM | HEIGHT: 60 IN | SYSTOLIC BLOOD PRESSURE: 120 MMHG | HEART RATE: 73 BPM

## 2023-08-10 DIAGNOSIS — N40.0 BENIGN PROSTATIC HYPERPLASIA, UNSPECIFIED WHETHER LOWER URINARY TRACT SYMPTOMS PRESENT: ICD-10-CM

## 2023-08-10 DIAGNOSIS — J18.9 PNEUMONIA: ICD-10-CM

## 2023-08-10 DIAGNOSIS — I10 PRIMARY HYPERTENSION: ICD-10-CM

## 2023-08-10 DIAGNOSIS — F33.42 RECURRENT MAJOR DEPRESSIVE DISORDER, IN FULL REMISSION (HCC): ICD-10-CM

## 2023-08-10 DIAGNOSIS — C7A.8 NEUROENDOCRINE CARCINOMA (HCC): ICD-10-CM

## 2023-08-10 DIAGNOSIS — Q93.89 JACOBSEN SYNDROME: ICD-10-CM

## 2023-08-10 DIAGNOSIS — Z00.00 MEDICARE ANNUAL WELLNESS VISIT, SUBSEQUENT: Primary | ICD-10-CM

## 2023-08-10 DIAGNOSIS — J30.1 SEASONAL ALLERGIC RHINITIS DUE TO POLLEN: ICD-10-CM

## 2023-08-10 DIAGNOSIS — R09.02 HYPOXIA: ICD-10-CM

## 2023-08-10 DIAGNOSIS — F79 INTELLECTUAL DISABILITY: ICD-10-CM

## 2023-08-10 DIAGNOSIS — Z12.11 ENCOUNTER FOR SCREENING COLONOSCOPY: ICD-10-CM

## 2023-08-10 DIAGNOSIS — D3A.8 NEUROENDOCRINE TUMOR OF PANCREAS: ICD-10-CM

## 2023-08-10 DIAGNOSIS — R00.0 TACHYCARDIA: ICD-10-CM

## 2023-08-10 DIAGNOSIS — E78.2 MIXED HYPERLIPIDEMIA: ICD-10-CM

## 2023-08-10 DIAGNOSIS — M41.9 KYPHOSCOLIOSIS AND SCOLIOSIS: ICD-10-CM

## 2023-08-10 DIAGNOSIS — K21.9 GASTROESOPHAGEAL REFLUX DISEASE, UNSPECIFIED WHETHER ESOPHAGITIS PRESENT: ICD-10-CM

## 2023-08-10 PROBLEM — R06.89 ACUTE RESPIRATORY INSUFFICIENCY: Status: RESOLVED | Noted: 2023-06-23 | Resolved: 2023-08-10

## 2023-08-10 PROBLEM — A41.9 SEPSIS WITHOUT ACUTE ORGAN DYSFUNCTION (HCC): Status: RESOLVED | Noted: 2023-06-23 | Resolved: 2023-08-10

## 2023-08-10 PROCEDURE — G0439 PPPS, SUBSEQ VISIT: HCPCS | Performed by: FAMILY MEDICINE

## 2023-08-10 PROCEDURE — 99215 OFFICE O/P EST HI 40 MIN: CPT | Performed by: FAMILY MEDICINE

## 2023-08-10 RX ORDER — ALBUTEROL SULFATE 2.5 MG/3ML
2.5 SOLUTION RESPIRATORY (INHALATION) 2 TIMES DAILY
Qty: 75 ML | Refills: 5 | Status: SHIPPED | OUTPATIENT
Start: 2023-08-10

## 2023-08-10 RX ORDER — BUPROPION HYDROCHLORIDE 150 MG/1
150 TABLET ORAL EVERY MORNING
Qty: 30 TABLET | Refills: 5 | Status: SHIPPED | OUTPATIENT
Start: 2023-08-10 | End: 2024-02-06

## 2023-08-10 RX ORDER — SIMVASTATIN 40 MG
40 TABLET ORAL
Qty: 90 TABLET | Refills: 3 | Status: SHIPPED | OUTPATIENT
Start: 2023-08-10

## 2023-08-10 RX ORDER — OLOPATADINE HYDROCHLORIDE 1 MG/ML
1 SOLUTION/ DROPS OPHTHALMIC 2 TIMES DAILY PRN
Start: 2023-08-10

## 2023-08-10 RX ORDER — OMEPRAZOLE 40 MG/1
40 CAPSULE, DELAYED RELEASE ORAL DAILY
Qty: 90 CAPSULE | Refills: 3 | Status: SHIPPED | OUTPATIENT
Start: 2023-08-10

## 2023-08-10 RX ORDER — METOPROLOL SUCCINATE 25 MG/1
25 TABLET, EXTENDED RELEASE ORAL DAILY
Qty: 90 TABLET | Refills: 3 | Status: SHIPPED | OUTPATIENT
Start: 2023-08-10

## 2023-08-10 NOTE — PROGRESS NOTES
ASSESSMENT/PLAN:     Kyphosis and scoliosis causing mechanical decrease in lung volumes  Also eating and probably microscopic aspiration  Also causing hypoxia  Patient is now eating Yemi Kaplan is not distracted so he does not cough  He is using oxygen at bedtime he is going to use his nebulizer twice daily once when he gets up once after dinnertime  He will use Mucinex twice daily with 8 ounces of water  These measures are preventative to keep him from getting pneumonia again  Continue omeprazole    Dysphagia  Patient working with speech therapy and doing very well      Restless leg  Tums Ultra every night at dinnertime  Restless legs have all but disappeared     Hypertension  Continue Toprol  120/70     Depression  Possible thoughts of dizziness coming from short acting Wellbutrin  Will try long-acting Wellbutrin and see how he does over the next 6 months in terms of dizziness    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  Which includes his kyphoscoliosis and his speech disorder and his intellectual disability and his GI disorder      BPH  According to notes patient had a laser therapy but nothing more is really said  Check PSA     Seborrheic dermatitis  Is on ketoconazole shampoo which is doing a good job taking care of his scalp     Hyperlipidemia  On simvastatin  Check cholesterol before next visit     Tachycardia  We assume this is why patient is on a beta-blocker as he has no other recent      Neuroendocrine tumor of the pancreas  Apparently this was resected at least 5 years ago and no mention of it or follow-up was ever made again.     Allergic rhinitis  When appropriate patient takes Claritin and Patanol as needed      recheck in 6 months for physical  Recheck as needed otherwise          Health Maintenance   Topic Date Due   • Medicare Annual Wellness Visit (AWV)  Never done   • Colorectal Cancer Screening  Never done   • COVID-19 Vaccine (2 - Pfizer series) 03/23/2022   • Influenza Vaccine (1) 09/01/2023 • HIV Screening  02/03/2025 (Originally 11/10/1975)   • Depression Remission PHQ  11/16/2023   • BMI: Adult  07/03/2024   • Hepatitis C Screening  Completed   • Pneumococcal Vaccine: Pediatrics (0 to 5 Years) and At-Risk Patients (6 to 59 Years)  Aged Out   • HIB Vaccine  Aged Out   • IPV Vaccine  Aged Out   • Hepatitis A Vaccine  Aged Out   • Meningococcal ACWY Vaccine  Aged Out   • HPV Vaccine  Aged Out         Problem List as of 8/10/2023 Reviewed: 7/19/2023  6:08 PM by Tri Frias PT    Abnormal gait    Acute respiratory insufficiency    Last Assessment & Plan 6/23/2023 Hospital Encounter Written 6/27/2023  1:45 PM by Blas Schwartz PA-C     · SPO2 on room air 87 to 88%  · SPO2 stable on 2 L supplemental oxygen via nasal cannula overnight  · Wean oxygen as able  · Patient able to be weaned to RA while awake/with ambulation however still with desats overnight while sleeping  · Overnight pulse ox study completed -patient qualified for O2 nightly arranged by case management         Allergic rhinitis    Anxiety    BPH (benign prostatic hyperplasia)    Depression    Last Assessment & Plan 6/23/2023 Hospital Encounter Written 6/27/2023  1:45 PM by Blas Schwartz PA-C     · Home regimen: Wellbutrin 100 Mg twice daily  · Continue home medication         Dysphagia    Last Assessment & Plan 6/23/2023 Hospital Encounter Written 6/27/2023  1:46 PM by Blas Schwartz PA-C     · Sister reports progressive worsening of dysphagia with upcoming speech therapy appointment  · Episode of choking on 6/18  · Speech and swallow evaluation done.   Patient underwent video barium swallow and was started on dysphagia 3 with thin liquid         GERD (gastroesophageal reflux disease)    Last Assessment & Plan 6/23/2023 Hospital Encounter Written 6/27/2023  1:46 PM by Blas Schwartz PA-C     · Home regimen: Omeprazole 40 Mg daily and Carafate 1 g twice daily  · Continue home medication         Hypoxia    Inhalation of liquid or vomitus, lower respiratory tract    Intellectual disability    Last Assessment & Plan 6/23/2023 Hospital Encounter Written 6/27/2023  1:46 PM by Azucena Schilder, PA-C     · Currently living with his sister with plans to move into a group home once available         Floridalma Brooks Syndrome    Kyphoscoliosis and scoliosis    Mixed hyperlipidemia    Neuroendocrine carcinoma ([address])    Neuroendocrine tumor of pancreas    Other psoriasis    Primary hypertension    Last Assessment & Plan 6/23/2023 Hospital Encounter Written 6/27/2023  1:46 PM by Azucena Schilder, PA-C     · Home regimen: metoprolol 25 Mg daily  · Continue Toprol-XL         Recurrent major depressive disorder ([address])    Restless leg    Sensorineural hearing loss    Sepsis without acute organ dysfunction [facility])    Last Assessment & Plan 6/23/2023 Hospital Encounter Written 6/27/2023  1:46 PM by Azucena Schilder, PA-C     · SIRS criteria met on admission: Tachycardia and leukocytosis  · In setting of possible aspiration pneumonia  · No severe sepsis criteria met  · On IV Rocephin  · Leukocytosis resolved  · UA is negative  · Blood cultures are negative to date         Urinary incontinence         Subjective:   Chief Complaint   Patient presents with   • Medicare Wellness Visit     Patient here with his sister for 6-month recheck but actually 1 month recheck after being seen in the hospital for aspiration pneumonia    Since that time physical therapy and speech therapy are both working and patient is doing very well with learning  He is using "high knees "for marching to not trip and his speech therapy's biggest recommendation is eating alone so that he does not get distracted and aspirate    Video swallow was reviewed during this visit    Also using oxygen at bedtime    Blood work from February order was forgotten because of hospitalization      patient ID: Megan Cruz is a 65 y.o. male.     Patient's past medical history, surgical history, family history, social history, and Tobacco history reviewed with patient. MED LIST WAS REVIEWED AND UPDATED    ROS  As per HPI  Rest of 12 point review of systems negative     Objective:      VITALS:  Wt Readings from Last 3 Encounters:   08/10/23 46.9 kg (103 lb 6.4 oz)   07/03/23 45.3 kg (99 lb 12.8 oz)   06/23/23 46.2 kg (101 lb 12.8 oz)     BP Readings from Last 3 Encounters:   08/10/23 120/78   07/03/23 124/82   06/27/23 132/85     Pulse Readings from Last 3 Encounters:   08/10/23 73   07/03/23 83   06/27/23 75     Body mass index is 24.25 kg/m². Laboratory Results:    All pertinent labs and studies were reviewed with patient during this office visit with highlights of the results contained in this note in the ASSESSMENT AND PLAN section       Physical Exam    General  Patient in no acute distress, well appearing, well nourished and appears stated age    Mental status  Pleasant cooperative some what garbled speech, mentally impaired and slow

## 2023-08-10 NOTE — PATIENT INSTRUCTIONS
Patient will 7 get scheduled for colonoscopy    Patient will go to the pharmacy for his 2 shingles vaccine    Patient's sister is working on [employer]    Patient will get fasting blood work and urine    Begin nebulizer 1 in the morning and one at dinnertime    Continue Mucinex twice daily with 8 ounces of water    Continue Tums for restless legs    Stop short acting Wellbutrin 100 mg and start Wellbutrin  mg once a day  This could just be interchanged      Recheck in 6 months for full physical at that time or sooner if needed                  Medicare Preventive Visit Patient Instructions  Thank you for completing your Welcome to Medicare Visit or Medicare Annual Wellness Visit today. Your next wellness visit will be due in one year (8/10/2024). The screening/preventive services that you may require over the next 5-10 years are detailed below. Some tests may not apply to you based off risk factors and/or age. Screening tests ordered at today's visit but not completed yet may show as past due. Also, please note that scanned in results may not display below. Preventive Screenings:  Service Recommendations Previous Testing/Comments   Colorectal Cancer Screening  Colonoscopy    Fecal Occult Blood Test (FOBT)/Fecal Immunochemical Test (FIT)  Fecal DNA/Cologuard Test  Flexible Sigmoidoscopy Age: 43-73 years old   Colonoscopy: every 10 years (May be performed more frequently if at higher risk)  OR  FOBT/FIT: every 1 year  OR  Cologuard: every 3 years  OR  Sigmoidoscopy: every 5 years  Screening may be recommended earlier than age 39 if at higher risk for colorectal cancer. Also, an individualized decision between you and your healthcare provider will decide whether screening between the ages of 77-80 would be appropriate.  Colonoscopy: Not on file  FOBT/FIT: Not on file  Cologuard: Not on file  Sigmoidoscopy: Not on file          Prostate Cancer Screening Individualized decision between patient and health care provider in men between ages of 53-66   Medicare will cover every 12 months beginning on the day after your 50th birthday PSA: No results in last 5 years           Hepatitis C Screening Once for adults born between 1945 and 1965  More frequently in patients at high risk for Hepatitis C Hep C Antibody: 02/15/2023    Screening Current   Diabetes Screening 1-2 times per year if you're at risk for diabetes or have pre-diabetes Fasting glucose: No results in last 5 years (No results in last 5 years)  A1C: No results in last 5 years (No results in last 5 years)  Screening Current   Cholesterol Screening Once every 5 years if you don't have a lipid disorder. May order more often based on risk factors. Lipid panel: Not on file  Screening Not Indicated  History Lipid Disorder      Other Preventive Screenings Covered by Medicare:  Abdominal Aortic Aneurysm (AAA) Screening: covered once if your at risk. You're considered to be at risk if you have a family history of AAA or a male between the age of 70-76 who smoking at least 100 cigarettes in your lifetime. Lung Cancer Screening: covers low dose CT scan once per year if you meet all of the following conditions: (1) Age 48-67; (2) No signs or symptoms of lung cancer; (3) Current smoker or have quit smoking within the last 15 years; (4) You have a tobacco smoking history of at least 20 pack years (packs per day x number of years you smoked); (5) You get a written order from a healthcare provider.   Glaucoma Screening: covered annually if you're considered high risk: (1) You have diabetes OR (2) Family history of glaucoma OR (3)  aged 48 and older OR (3)  American aged 72 and older  Osteoporosis Screening: covered every 2 years if you meet one of the following conditions: (1) Have a vertebral abnormality; (2) On glucocorticoid therapy for more than 3 months; (3) Have primary hyperparathyroidism; (4) On osteoporosis medications and need to assess response to drug therapy. HIV Screening: covered annually if you're between the age of 14-79. Also covered annually if you are younger than 13 and older than 72 with risk factors for HIV infection. For pregnant patients, it is covered up to 3 times per pregnancy. Immunizations:  Immunization Recommendations   Influenza Vaccine Annual influenza vaccination during flu season is recommended for all persons aged >= 6 months who do not have contraindications   Pneumococcal Vaccine   * Pneumococcal conjugate vaccine = PCV13 (Prevnar 13), PCV15 (Vaxneuvance), PCV20 (Prevnar 20)  * Pneumococcal polysaccharide vaccine = PPSV23 (Pneumovax) Adults 20-63 years old: 1-3 doses may be recommended based on certain risk factors  Adults 72 years old: 1-2 doses may be recommended based off what pneumonia vaccine you previously received   Hepatitis B Vaccine 3 dose series if at intermediate or high risk (ex: diabetes, end stage renal disease, liver disease)   Tetanus (Td) Vaccine - COST NOT COVERED BY MEDICARE PART B Following completion of primary series, a booster dose should be given every 10 years to maintain immunity against tetanus. Td may also be given as tetanus wound prophylaxis. Tdap Vaccine - COST NOT COVERED BY MEDICARE PART B Recommended at least once for all adults. For pregnant patients, recommended with each pregnancy. Shingles Vaccine (Shingrix) - COST NOT COVERED BY MEDICARE PART B  2 shot series recommended in those aged 48 and above     Health Maintenance Due:      Topic Date Due    Colorectal Cancer Screening  Never done    HIV Screening  02/03/2025 (Originally 11/10/1975)    Hepatitis C Screening  Completed     Immunizations Due:      Topic Date Due    COVID-19 Vaccine (2 - Pfizer series) 03/23/2022    Influenza Vaccine (1) 09/01/2023     Advance Directives   What are advance directives? Advance directives are legal documents that state your wishes and plans for medical care.  These plans are made ahead of time in case you lose your ability to make decisions for yourself. Advance directives can apply to any medical decision, such as the treatments you want, and if you want to donate organs. What are the types of advance directives? There are many types of advance directives, and each state has rules about how to use them. You may choose a combination of any of the following:  Living will: This is a written record of the treatment you want. You can also choose which treatments you do not want, which to limit, and which to stop at a certain time. This includes surgery, medicine, IV fluid, and tube feedings. Durable power of  for [facility]): This is a written record that states who you want to make healthcare choices for you when you are unable to make them for yourself. This person, called a proxy, is usually a family member or a friend. You may choose more than 1 proxy. Do not resuscitate (DNR) order:  A DNR order is used in case your heart stops beating or you stop breathing. It is a request not to have certain forms of treatment, such as CPR. A DNR order may be included in other types of advance directives. Medical directive: This covers the care that you want if you are in a coma, near death, or unable to make decisions for yourself. You can list the treatments you want for each condition. Treatment may include pain medicine, surgery, blood transfusions, dialysis, IV or tube feedings, and a ventilator (breathing machine). Values history: This document has questions about your views, beliefs, and how you feel and think about life. This information can help others choose the care that you would choose. Why are advance directives important? An advance directive helps you control your care. Although spoken wishes may be used, it is better to have your wishes written down. Spoken wishes can be misunderstood, or not followed. Treatments may be given even if you do not want them.  An advance directive may make it easier for your family to make difficult choices about your care. © Copyright [employer] 2018 Information is for End User's use only and may not be sold, redistributed or otherwise used for commercial purposes.  All illustrations and images included in CareNotes® are the copyrighted property of [employer]D.A[employer]M., Inc. or [address] [place] [address]

## 2023-08-10 NOTE — PROGRESS NOTES
Assessment and Plan:     Patient will 7 get scheduled for colonoscopy    Patient will go to the pharmacy for his 2 shingles vaccine    Patient's sister is working on [email or web address]    Patient will get fasting blood work and urine  Problem List Items Addressed This Visit     Allergic rhinitis    Relevant Medications    olopatadine (PATANOL) 0.1 % ophthalmic solution    Anxiety    BPH (benign prostatic hyperplasia)    GERD (gastroesophageal reflux disease)    Relevant Medications    omeprazole (PriLOSEC) 40 MG capsule    Hypoxia    Intellectual disability    Angeline Chute Syndrome    Kyphoscoliosis and scoliosis    Mixed hyperlipidemia    Relevant Medications    simvastatin (ZOCOR) 40 mg tablet    Neuroendocrine carcinoma (HCC)    Neuroendocrine tumor of pancreas    Primary hypertension    Relevant Medications    metoprolol succinate (TOPROL-XL) 25 mg 24 hr tablet    Recurrent major depressive disorder, in full remission (HCC)    Relevant Medications    buPROPion (WELLBUTRIN XL) 150 mg 24 hr tablet   Other Visit Diagnoses     Medicare annual wellness visit, subsequent    -  Primary    Encounter for screening colonoscopy        Relevant Orders    Ambulatory referral to Gastroenterology    Pneumonia        Relevant Medications    albuterol (2.5 mg/3 mL) 0.083 % nebulizer solution    Tachycardia        Relevant Medications    metoprolol succinate (TOPROL-XL) 25 mg 24 hr tablet           Preventive health issues were discussed with patient, and age appropriate screening tests were ordered as noted in patient's After Visit Summary. Personalized health advice and appropriate referrals for health education or preventive services given if needed, as noted in patient's After Visit Summary.      History of Present Illness:     Patient presents for a Medicare Wellness Visit    HPI   Patient Care Team:  Cecily Carrizales DO as PCP - General (Family Medicine)     Review of Systems:     Review of Systems     Problem List:     Patient Active Problem List   Diagnosis   • Abnormal gait   • Allergic rhinitis   • Anxiety   • BPH (benign prostatic hyperplasia)   • GERD (gastroesophageal reflux disease)   • Mixed hyperlipidemia   • Neuroendocrine tumor of pancreas   • Other psoriasis   • Recurrent major depressive disorder, in full remission ([address])   • Urinary incontinence   • Sensorineural hearing loss   • Intellectual disability   • Jaylin Durie Syndrome   • Neuroendocrine carcinoma (HCC)   • Kyphoscoliosis and scoliosis   • Dysphagia   • Depression   • Primary hypertension   • Hypoxia   • Inhalation of liquid or vomitus, lower respiratory tract   • Restless leg      Past Medical and Surgical History:     Past Medical History:   Diagnosis Date   • Depression    • GERD (gastroesophageal reflux disease)    • Intellectual disability    • Scoliosis, unspecified scoliosis type, unspecified spinal region      Past Surgical History:   Procedure Laterality Date   • APPENDECTOMY     • CHOLECYSTECTOMY     • HERNIA REPAIR     • PANCREAS SURGERY N/A     Removal of a mass perhaps 2018   • PROSTATE SURGERY N/A     "Laser prostate surgery "      Family History:     Family History   Problem Relation Age of Onset   • Heart disease Mother    • Parkinsonism Mother    • Heart disease Father    • Alcohol abuse Neg Hx    • Substance Abuse Neg Hx    • Mental illness Neg Hx       Social History:     Social History     Socioeconomic History   • Marital status: Single     Spouse name: None   • Number of children: 0   • Years of education: None   • Highest education level: None   Occupational History   • None   Tobacco Use   • Smoking status: Never     Passive exposure: Never   • Smokeless tobacco: Never   Vaping Use   • Vaping Use: Never used   Substance and Sexual Activity   • Alcohol use: Not Currently   • Drug use: Never   • Sexual activity: None   Other Topics Concern   • None   Social History Narrative   • None     Social Determinants of Health     Financial Resource Strain: Low Risk (8/10/2023)    Overall Financial Resource Strain (CARDIA)    • Difficulty of Paying Living Expenses: Not very hard   Food Insecurity: No Food Insecurity (6/23/2023)    Hunger Vital Sign    • Worried About Running Out of Food in the Last Year: Never true    • Ran Out of Food in the Last Year: Never true   Transportation Needs: No Transportation Needs (8/10/2023)    PRAPARE - Transportation    • Lack of Transportation (Medical): No    • Lack of Transportation (Non-Medical):  No   Physical Activity: Not on file   Stress: Not on file   Social Connections: Not on file   Intimate Partner Violence: Not on file   Housing Stability: Low Risk  (6/23/2023)    Housing Stability Vital Sign    • Unable to Pay for Housing in the Last Year: No    • Number of Places Lived in the Last Year: 1    • Unstable Housing in the Last Year: No      Medications and Allergies:     Current Outpatient Medications   Medication Sig Dispense Refill   • acetaminophen (TYLENOL) 325 mg tablet Take 2 tablets (650 mg total) by mouth every 4 (four) hours as needed for moderate pain or fever     • albuterol (2.5 mg/3 mL) 0.083 % nebulizer solution Take 3 mL (2.5 mg total) by nebulization 2 (two) times a day 75 mL 5   • buPROPion (WELLBUTRIN XL) 150 mg 24 hr tablet Take 1 tablet (150 mg total) by mouth every morning 30 tablet 5   • calcium carbonate (TUMS ULTRA) 1000 MG chewable tablet Chew 1 tablet (1,000 mg total) 2 (two) times a day  0   • loratadine (CLARITIN) 10 mg tablet Take 1 tablet (10 mg total) by mouth daily as needed for allergies     • metoprolol succinate (TOPROL-XL) 25 mg 24 hr tablet Take 1 tablet (25 mg total) by mouth daily 90 tablet 3   • olopatadine (PATANOL) 0.1 % ophthalmic solution Administer 1 drop to both eyes 2 (two) times a day as needed for allergies     • omeprazole (PriLOSEC) 40 MG capsule Take 1 capsule (40 mg total) by mouth daily 90 capsule 3   • polyethylene glycol (MIRALAX) 17 g packet Take 17 g by mouth daily     • simvastatin (ZOCOR) 40 mg tablet Take 1 tablet (40 mg total) by mouth daily at bedtime 90 tablet 3   • sucralfate (CARAFATE) 1 g tablet Take 1 tablet (1 g total) by mouth 2 (two) times a day 180 tablet 3     No current facility-administered medications for this visit. Allergies   Allergen Reactions   • Levofloxacin Itching   • Codeine Nausea Only     Sister is unsure of actual reaction. Immunizations:     Immunization History   Administered Date(s) Administered   • COVID-19 PFIZER VACCINE 0.3 ML IM 01/26/2022   • INFLUENZA 10/11/2017, 11/16/2019   • Influenza Injectable, MDCK, Preservative Free, Quadrivalent 11/09/2018, 09/27/2021   • Influenza, seasonal, injectable 01/06/2013, 10/08/2013, 10/06/2015, 10/14/2016, 10/04/2022   • Influenza, seasonal, injectable, preservative free 10/07/2020   • Meningococcal Polysaccharide (MPSV4) 12/17/2014   • Pneumococcal Polysaccharide PPV23 10/03/2014   • Td (adult), adsorbed 08/14/2011, 04/04/2017   • Tdap 08/14/2011, 06/02/2017      Health Maintenance:         Topic Date Due   • Colorectal Cancer Screening  Never done   • HIV Screening  02/03/2025 (Originally 11/10/1975)   • Hepatitis C Screening  Completed         Topic Date Due   • COVID-19 Vaccine (2 - Pfizer series) 03/23/2022   • Influenza Vaccine (1) 09/01/2023      Medicare Screening Tests and Risk Assessments:     Nathan Castro is here for his Subsequent Wellness visit. Health Risk Assessment:   Patient rates overall health as good. Patient feels that their physical health rating is same. Patient is satisfied with their life. Eyesight was rated as same. Hearing was rated as same. Patient feels that their emotional and mental health rating is same. Patients states they are never, rarely angry. Patient states they are often unusually tired/fatigued. Pain experienced in the last 7 days has been none. Patient states that he has experienced no weight loss or gain in last 6 months. Fall Risk Screening:    In the past year, patient has experienced: no history of falling in past year      Home Safety:  Patient does not have trouble with stairs inside or outside of their home. Patient has working smoke alarms and has working carbon monoxide detector. Home safety hazards include: none. Nutrition:   Current diet is Regular and Limited junk food. Medications:   Patient is currently taking over-the-counter supplements. OTC medications include: see medication list. Patient is not able to manage medications. Sister help     Activities of Daily Living (ADLs)/Instrumental Activities of Daily Living (IADLs):   Walk and transfer into and out of bed and chair?: Yes  Dress and groom yourself?: Yes    Bathe or shower yourself?: Yes    Feed yourself?  Yes  Do your laundry/housekeeping?: Yes  Manage your money, pay your bills and track your expenses?: No  Make your own meals?: Yes    Do your own shopping?: No    ADL comments: Sister help     Previous Hospitalizations:   Any hospitalizations or ED visits within the last 12 months?: Yes    How many hospitalizations have you had in the last year?: 1-2    Hospitalization Comments: Aspiration pneumonia    Advance Care Planning:   Living will: No    Durable POA for healthcare: No    Advanced directive: No    End of Life Decisions reviewed with patient: Yes      Comments: Pt sister is working on a POA first and them to discuss living will    Cognitive Screening:   Provider or family/friend/caregiver concerned regarding cognition?: No    PREVENTIVE SCREENINGS      Cardiovascular Screening:    General: Screening Not Indicated and History Lipid Disorder      Diabetes Screening:     General: Screening Current      Colorectal Cancer Screening:       Due for: Colonoscopy - High Risk      Prostate Cancer Screening:      Due for: PSA      Osteoporosis Screening:    General: Screening Not Indicated      Abdominal Aortic Aneurysm (AAA) Screening:        General: Screening Current      Lung Cancer Screening:     General: Screening Not Indicated      Hepatitis C Screening:    General: Screening Current    Screening, Brief Intervention, and Referral to Treatment (SBIRT)    Screening  Typical number of drinks in a day: 0  Typical number of drinks in a week: 0  Interpretation: Low risk drinking behavior. Single Item Drug Screening:  How often have you used an illegal drug (including marijuana) or a prescription medication for non-medical reasons in the past year? never    Single Item Drug Screen Score: 0  Interpretation: Negative screen for possible drug use disorder    Brief Intervention  Alcohol & drug use screenings were reviewed. No concerns regarding substance use disorder identified. Other Counseling Topics:   Calcium and vitamin D intake and regular weightbearing exercise. No results found.      Physical Exam:     /78   Pulse 73   Resp 18   Ht 4' 6.75" (1.391 m)   Wt 46.9 kg (103 lb 6.4 oz)   SpO2 99%   BMI 24.25 kg/m²     Physical Exam     Fly Zendejas DO

## 2023-08-10 NOTE — TELEPHONE ENCOUNTER
When Willis Markham was here, Jackson King advised that he has oxygen at night that is supplied by [facility]. They need a new script for it that says  "oxygen Level 2".   It needs to be faxed to them at [phone]

## 2023-08-14 ENCOUNTER — OFFICE VISIT (OUTPATIENT)
Dept: SPEECH THERAPY | Facility: CLINIC | Age: 63
End: 2023-08-14
Payer: MEDICARE

## 2023-08-14 DIAGNOSIS — F80.9 SPEECH DISORDER DEVELOPMENTAL: Primary | ICD-10-CM

## 2023-08-14 DIAGNOSIS — R13.12 OROPHARYNGEAL DYSPHAGIA: ICD-10-CM

## 2023-08-14 PROCEDURE — 92526 ORAL FUNCTION THERAPY: CPT

## 2023-08-14 NOTE — PROGRESS NOTES
Daily Speech Treatment Note    Today's date: 2023  Patient’s name: Monse Condon  : 1960  MRN: [id number]  Safety measures:   Referring provider: DO Candelaria Hdz Diagnosis     ICD-10-CM    1. 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 Oropharyngeal dysphagia  R13.12         Visit trackin    Subjective/Behavioral:  - Pleasant/Cooperative    Objective/Assessment:  Completed structured activities with patient to target goals below. Results as stated below. Short-term goals:       Patient will complete exercises to strengthen hyoid excursion & epiglottic inversion with instruction & education with assistance from a support person or SLP.  : Educated and demonstrated exercises: effortful exercise and straw sucking: to increase epiglottic inversion.     Patient will complete daily oral motor exercises (with and without IOPI bulb) to increase lingual/labial range of motion, strength, and coordination with min cues to 90% effectiveness to improve bolus formation and strengthen oral musculature. 23: Completed tongue strengthening exercises with resistance / tongue depressor, minimal resistance initiated. : Completed IOPI exercises. Strength and Endurance Testing: The IOPI Pro is used by medical professionals to measure, evaluate, and increase the strength and endurance of the tongue and lip in patients with oral motor disorders, including dysphagia and dysarthria. The IOPI measures the maximum pressure (Pmax) a patient can produce in an air-filled bulb when it is compressed as hard as possible by the tongue or lip against a hard surface (e.g. The palate or teeth, respectively). Pmax is a measure of strength, expressed in kilopascals (kPa, an international unit of pressure). For patients with dysphagia or dysarthria, oral motor fatigability may be of interest.  The IOPI Pro can be used to assess tongue fatigability.   Low endurance values are an indicator of a high fatigability. Endurance is measured with the IOPI Pro by quantifying the length of time that a patient can maintain 50% of his or her Pmax. This procedure is conducted in Target Mode by setting the target value to 50% of the patient's Pmax and timing how long the patient can hold the top (green) light on. The medical professional determines what target value is appropriate for exercise therapy purposes and provides specific instructions to the patient for a particular exercise protocol. In Target Mode, the pressure required to illuminate the green light a the top of the light array can be adjusted using the Set Target arrow buttons. This green light is used as a visual target for the patient. Tongue tip Peak Max after 3 trials: 10, completed level 6 x 12 8/14: Could not complete despite level 5. Norm for gender is 61   Tongue back Peak Max after 3 trials: 7, completed level 5 x 10- could not complete today on 8/7/23. 8/14: Could not complete despite level 5. Norm for gender is 5-10% lower than anterior tongue   Right lip Peak Max after 3 trials: 16, completed level 10 x 0. - unable to complete. 8/14: Completed x 30 at level 10- next time try level 15. Norm for gender is 35   Left lip Peak Max after 3 trials: 16, completed level 10 x 10.  8/14: Completed x 30 level 10. Norm for gender is 28        Patient will perform compensatory strategies (e.g., upright positioning, small bites/sips, slow rate) with 80% accuracy to eliminate overt s/sx penetration/aspiration of least restrictive food/liquid consistencies. 8/7: Stated strategies that patient uses for swallowing and no overt signs / symptoms of aspiration noted. 8/14: Utilizing strategies at home per brother in law. Patient will tolerate regular / thin liquid diet with minimized risk of aspiration & no reported signs of aspiration during session and at home by younger sister.   8/14: Declined regular/thin snack.     Patient / caregiver will accept understanding of implementation of swallowing/breathing and expiratory resistance / strengthening device, [id number]-75.         Long Term        Patient will utilize compensatory strategies with optimum safety and efficacy of swallowing function on P.O. intake without overt s/sx of penetration or aspiration by discharge.     Patient will develop safe and functional chewing and swallowing via use of dysphagia management strategies and diet modifications for adequate meal completion without significant s/sx of dysphagia and aspiration in order to maximize quality of life and to decrease potential for medical complications for dysphagia.              Plan:  -Continue with current plan of care.

## 2023-08-21 ENCOUNTER — APPOINTMENT (OUTPATIENT)
Dept: SPEECH THERAPY | Facility: CLINIC | Age: 63
End: 2023-08-21
Payer: MEDICARE

## 2023-08-22 DIAGNOSIS — F33.42 RECURRENT MAJOR DEPRESSIVE DISORDER, IN FULL REMISSION (HCC): ICD-10-CM

## 2023-08-22 RX ORDER — BUPROPION HYDROCHLORIDE 100 MG/1
200 TABLET ORAL 2 TIMES DAILY
Qty: 360 TABLET | Refills: 0 | OUTPATIENT
Start: 2023-08-22

## 2023-08-24 DIAGNOSIS — F33.42 RECURRENT MAJOR DEPRESSIVE DISORDER, IN FULL REMISSION (HCC): ICD-10-CM

## 2023-08-24 RX ORDER — BUPROPION HYDROCHLORIDE 150 MG/1
150 TABLET ORAL EVERY MORNING
Qty: 90 TABLET | Refills: 2 | Status: SHIPPED | OUTPATIENT
Start: 2023-08-24

## 2023-09-06 ENCOUNTER — OFFICE VISIT (OUTPATIENT)
Dept: SPEECH THERAPY | Facility: CLINIC | Age: 63
End: 2023-09-06
Payer: MEDICARE

## 2023-09-06 DIAGNOSIS — R13.12 OROPHARYNGEAL DYSPHAGIA: ICD-10-CM

## 2023-09-06 DIAGNOSIS — F80.9 SPEECH DISORDER DEVELOPMENTAL: Primary | ICD-10-CM

## 2023-09-06 PROCEDURE — 92526 ORAL FUNCTION THERAPY: CPT

## 2023-09-06 NOTE — PROGRESS NOTES
Discharge Report    Today's date: 2023  Patient’s name: Varun Franklin  : 1960  MRN: [id number]  Safety measures:   Referring provider: Molly Aiken DO    Encounter Diagnosis     ICD-10-CM    1. 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 Oropharyngeal dysphagia  R13.12         Visit trackin    Subjective/Behavioral:  - Pleasant/Cooperative    Objective/Assessment:  Completed structured activities with patient to target goals below. Results as stated below. Short-term goals:       Patient will complete exercises to strengthen hyoid excursion & epiglottic inversion with instruction & education with assistance from a support person or SLP.  : Educated and demonstrated exercises: effortful exercise and straw sucking: to increase epiglottic inversion. ACHIEVED     Patient will complete daily oral motor exercises (with and without IOPI bulb) to increase lingual/labial range of motion, strength, and coordination with min cues to 90% effectiveness to improve bolus formation and strengthen oral musculature. 23: Completed tongue strengthening exercises with resistance / tongue depressor, minimal resistance initiated. : Completed IOPI exercises. ACHIEVED BUT IOPI EXERCISES VERY DIFFICULT FOR PATIENT TO COMPLETE, PATIENT MAY BE SUITED BEST WITH COMPENSATORY STRATEGIES AND USE OF EMST-75. Strength and Endurance Testing: The IOPI Pro is used by medical professionals to measure, evaluate, and increase the strength and endurance of the tongue and lip in patients with oral motor disorders, including dysphagia and dysarthria. The IOPI measures the maximum pressure (Pmax) a patient can produce in an air-filled bulb when it is compressed as hard as possible by the tongue or lip against a hard surface (e.g. The palate or teeth, respectively). Pmax is a measure of strength, expressed in kilopascals (kPa, an international unit of pressure).        For patients with dysphagia or dysarthria, oral motor fatigability may be of interest.  The IOPI Pro can be used to assess tongue fatigability. Low endurance values are an indicator of a high fatigability. Endurance is measured with the IOPI Pro by quantifying the length of time that a patient can maintain 50% of his or her Pmax. This procedure is conducted in Target Mode by setting the target value to 50% of the patient's Pmax and timing how long the patient can hold the top (green) light on. The medical professional determines what target value is appropriate for exercise therapy purposes and provides specific instructions to the patient for a particular exercise protocol. In Target Mode, the pressure required to illuminate the green light a the top of the light array can be adjusted using the Set Target arrow buttons. This green light is used as a visual target for the patient. Tongue tip Peak Max after 3 trials: 10, completed level 6 x 12 8/14: Could not complete despite level 5. Norm for gender is 61   Tongue back Peak Max after 3 trials: 7, completed level 5 x 10- could not complete today on 8/7/23. 8/14: Could not complete despite level 5. Norm for gender is 5-10% lower than anterior tongue   Right lip Peak Max after 3 trials: 16, completed level 10 x 0. - unable to complete. 8/14: Completed x 30 at level 10- next time try level 15. Norm for gender is 35   Left lip Peak Max after 3 trials: 16, completed level 10 x 10.  8/14: Completed x 30 level 10. Norm for gender is 28        Patient will perform compensatory strategies (e.g., upright positioning, small bites/sips, slow rate) with 80% accuracy to eliminate overt s/sx penetration/aspiration of least restrictive food/liquid consistencies. 8/7: Stated strategies that patient uses for swallowing and no overt signs / symptoms of aspiration noted. 8/14: Utilizing strategies at home per brother in law.   9/6: Patient implementing at home independently except for needs cueing with alternating solids and liquids per sister report. Patient will tolerate regular / thin liquid diet with minimized risk of aspiration & no reported signs of aspiration during session and at home by younger sister. 8/14: Declined regular/thin snack. 9/6: Sister reports patient tolerating without difficulty, no overt signs or symptoms of aspiration. ACHIEVED     Patient / caregiver will accept understanding of implementation of swallowing/breathing and expiratory resistance / strengthening device, EMST-75. 9/6: Patient completing device at home and provided training paper for patient and sister, expressed understanding.        Long Term        Patient will utilize compensatory strategies with optimum safety and efficacy of swallowing function on P.O. intake without overt s/sx of penetration or aspiration by discharge. ACHIEVED     Patient will develop safe and functional chewing and swallowing via use of dysphagia management strategies and diet modifications for adequate meal completion without significant s/sx of dysphagia and aspiration in order to maximize quality of life and to decrease potential for medical complications for dysphagia. ACHIEVED             Plan:  Patient cleared for discharge at this time. Patient / sister understanding of exercise plan, EMST-75 and compensatory strategies for optimal swallowing. If any future questions/needs, patient / sister know to contact SLP.

## 2023-09-07 ENCOUNTER — OFFICE VISIT (OUTPATIENT)
Dept: FAMILY MEDICINE CLINIC | Facility: CLINIC | Age: 63
End: 2023-09-07
Payer: MEDICARE

## 2023-09-07 VITALS
SYSTOLIC BLOOD PRESSURE: 118 MMHG | DIASTOLIC BLOOD PRESSURE: 75 MMHG | OXYGEN SATURATION: 100 % | RESPIRATION RATE: 22 BRPM | BODY MASS INDEX: 24.93 KG/M2 | HEART RATE: 78 BPM | WEIGHT: 106.3 LBS

## 2023-09-07 DIAGNOSIS — R09.02 HYPOXIA: Primary | ICD-10-CM

## 2023-09-07 DIAGNOSIS — M41.9 KYPHOSCOLIOSIS AND SCOLIOSIS: ICD-10-CM

## 2023-09-07 PROCEDURE — 99214 OFFICE O/P EST MOD 30 MIN: CPT | Performed by: FAMILY MEDICINE

## 2023-09-07 NOTE — PATIENT INSTRUCTIONS
Prescription for nasal oxygen and 2 L at nighttime sent to [address] [facility] [address]  If there are any other questions have them call and ask for [facility] and go from there.

## 2023-09-07 NOTE — PROGRESS NOTES
Assessment/Plan:    6-minute walk test  Patient desaturated to 87% but was able to recover to his baseline 98% and less than a minute    Kyphoscoliosis  Hypoxia with exercise  Most likely hypoxia with sleep  Prescription for nasal O2 at 2 L for bedtime given               Subjective:   Aletha Mcgowan is a 65 y.o.male  Chief Complaint   Patient presents with   • discuss Oxygen      87%     Patient is here with his sister to ask for prescription for oxygen at night. Patient was sent home from his last hospitalization with nasal O2 only at bedtime but the justification is not known. I suspect this due to his gross kyphoscoliosis of his back and decreased vital capacity          Past medical history, social history, and family history reviewed as appropriate for the complaint of this patient. MEDICATIONS REVIEWED AND UPDATED    10 point review of systems performed, the remainder of the ROS is negative except for what is noted in the history of chief complaint    Objective:    Vitals:    09/07/23 1415   BP: 118/75   Pulse: 78   Resp: 22   SpO2: 100%     Body mass index is 24.93 kg/m².     Physical Exam    Constitutional  59-year-old man who is small for his age in no acute distress with a gross kyphoscoliosis causing him to sit with his head to the left    Mental Status  Alert, Oriented, Cooperative, Memory function normal , clean, and reasonable,with mental slowing    Neck  No neck mass, No thyromegaly, Good carotid upstrokes bilaterally, trachea midline positive click    Respiratory  Breath sounds normal, No rales, No rhonchi, No wheezing, normal palpation    Cardiac  Regular rhythm without ectopy or murmur no S3-S4, no heave lift or thrill to palpation    Vascular  No leg edema, No pedal edema    Muscular skeletal  No clubbing cyanosis , muscle tone normal    Skin  No appreciable rashes or abnormal appearing lesions

## 2023-10-09 ENCOUNTER — OFFICE VISIT (OUTPATIENT)
Dept: GASTROENTEROLOGY | Facility: MEDICAL CENTER | Age: 63
End: 2023-10-09
Payer: MEDICARE

## 2023-10-09 VITALS
DIASTOLIC BLOOD PRESSURE: 71 MMHG | HEART RATE: 72 BPM | SYSTOLIC BLOOD PRESSURE: 124 MMHG | WEIGHT: 101.2 LBS | TEMPERATURE: 98.2 F | BODY MASS INDEX: 23.74 KG/M2

## 2023-10-09 DIAGNOSIS — R13.12 OROPHARYNGEAL DYSPHAGIA: ICD-10-CM

## 2023-10-09 DIAGNOSIS — Z12.11 ENCOUNTER FOR SCREENING COLONOSCOPY: Primary | ICD-10-CM

## 2023-10-09 DIAGNOSIS — Z99.81 DEPENDENCE ON NOCTURNAL OXYGEN THERAPY: ICD-10-CM

## 2023-10-09 PROCEDURE — 99204 OFFICE O/P NEW MOD 45 MIN: CPT | Performed by: STUDENT IN AN ORGANIZED HEALTH CARE EDUCATION/TRAINING PROGRAM

## 2023-10-09 NOTE — PROGRESS NOTES
[facility] - Outpatient Consultation  Lion Pod 65 y.o. 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 .    1. Encounter for screening colonoscopy  Had an extensive conversation with Wayna Apgar and his sister about the risks and benefits of colon cancer screening. It is difficult to prognosticate his benefit for colon cancer screening as his sister reports he has significantly exceeded the standard life expectancy for Bettyann Leon syndrome. His colon cancer risk is average and he has no red flags. Discussed that we could pursue colonoscopy though he may be higher risk for the procedure due to his severe scoliosis, nocturnal oxygen requirement, and complicated abdominal surgical history. Jhony's sister will discuss this with other family members and let me know if they are still interested in pursuing colonoscopy. If he is to undergo colonoscopy, this should be performed in a hospital setting and I would recommend surgical optimization clinic consultation before hand. - Ambulatory referral to Gastroenterology    2. Oropharyngeal dysphagia  He was admitted to [facility] 2023 for aspiration pneumonia after choking episode. Working with speech therapy, reports significant improvement in swallowing with behavioral modifications. Denies any symptoms to suggest esophageal dysphagia. 3. Dependence on nocturnal oxygen therapy  On 2 L overnight due to desaturations that his sister say may be related to his scoliosis. If he is to undergo colonoscopy, would place surgical optimization clinic referral so that anesthesia risk can be quantified and discussed with family.       ______________________________________________________________________    HPI:    Has been doing speech therapy and PT, eating has much improved and no longer having issues with aspiration. No esophageal dysphagia. Takes omperazole 40 mg daily. No acid reflux     Has nocturnal hypoxia. Sleeps with 2L at night. Taking miralax every day. Finds it helps keep BM regular. No blood in stool    Sister thinks he's never had a colonoscopy. No family history of colon cancer. Had pancreas resection, CCY, appendectomy and hernia repair. VBS 6/23/23  Pt presents w/ mild oropharyngeal dysphagia rowena by prolonged anterior mastication, reduced oral control w/ anterior loss and premature posterior spill, and variable swallow delay. Mild reduced anterior hyoid excursion. Complete laryngeal elevation and laryngeal vestibule closure. Inconsistent epiglottic inversion, at times w/ curled tip remaining and subsequent vallecular retention. No penetration/aspiration on today's study. Suspect choking incidents 2/2 impulsivity/rapid intake, pt requires consistent cues for slow rate. Of note, esophageal retention w/ backflow visualized several times throughout study - cough often associated w/ backflow. REVIEW OF SYSTEMS:    CONSTITUTIONAL: Denies any fever, chills, rigors, and weight loss. HEENT: No earache or tinnitus. Denies hearing loss or visual disturbances. CARDIOVASCULAR: No chest pain or palpitations. RESPIRATORY: Denies any cough, hemoptysis, shortness of breath or dyspnea on exertion. GASTROINTESTINAL: As noted in the History of Present Illness. GENITOURINARY: No problems with urination. Denies any hematuria or dysuria. NEUROLOGIC: No dizziness or vertigo, denies headaches. MUSCULOSKELETAL: Denies any muscle or joint pain. SKIN: Denies skin rashes or itching. ENDOCRINE: Denies excessive thirst. Denies intolerance to heat or cold. PSYCHOSOCIAL: Denies depression or anxiety. Denies any recent memory loss.        Historical Information   Past Medical History:   Diagnosis Date   • Depression    • GERD (gastroesophageal reflux disease) • Intellectual disability    • Scoliosis, unspecified scoliosis type, unspecified spinal region      Past Surgical History:   Procedure Laterality Date   • APPENDECTOMY     • CHOLECYSTECTOMY     • HERNIA REPAIR     • PANCREAS SURGERY N/A     Removal of a mass perhaps 2018   • PROSTATE SURGERY N/A     "Laser prostate surgery "     Social History   Social History     Substance and Sexual Activity   Alcohol Use Not Currently     Social History     Substance and Sexual Activity   Drug Use Never     Social History     Tobacco Use   Smoking Status Never   • Passive exposure: Never   Smokeless Tobacco Never     Family History   Problem Relation Age of Onset   • Heart disease Mother    • Parkinsonism Mother    • Heart disease Father    • Alcohol abuse Neg Hx    • Substance Abuse Neg Hx    • Mental illness Neg Hx        Meds/Allergies       Current Outpatient Medications:   •  acetaminophen (TYLENOL) 325 mg tablet  •  albuterol (2.5 mg/3 mL) 0.083 % nebulizer solution  •  buPROPion (WELLBUTRIN XL) 150 mg 24 hr tablet  •  calcium carbonate (TUMS ULTRA) 1000 MG chewable tablet  •  loratadine (CLARITIN) 10 mg tablet  •  metoprolol succinate (TOPROL-XL) 25 mg 24 hr tablet  •  olopatadine (PATANOL) 0.1 % ophthalmic solution  •  omeprazole (PriLOSEC) 40 MG capsule  •  polyethylene glycol (MIRALAX) 17 g packet  •  simvastatin (ZOCOR) 40 mg tablet  •  sucralfate (CARAFATE) 1 g tablet    Allergies   Allergen Reactions   • Levofloxacin Itching   • Codeine Nausea Only     Sister is unsure of actual reaction.            Objective   /71 (BP Location: Left arm)   Pulse 72   Temp 98.2 °F (36.8 °C)   Wt 45.9 kg (101 lb 3.2 oz)   BMI 23.74 kg/m²         PHYSICAL EXAM:      General Appearance:   Alert, cooperative, no distress   HEENT:   Microcephalic   Neck:  Supple, symmetrical, trachea midline   Lungs:   Severe scoliosis, clear breath sounds on right, difficult to ascultate left lung sounds due to scoliosis    Heart[de-identified]   Regular rate and rhythm; no murmur, rub, or gallop. Abdomen:   Soft, non-tender, non-distended; normal bowel sounds; multiple well healed surgical scars    Genitalia:   Deferred    Rectal:   Deferred    Extremities:  No cyanosis, clubbing or edema    Pulses:  2+ and symmetric    Skin:  No jaundice, rashes, or lesions    Lymph nodes:  No palpable cervical lymphadenopathy        Lab Results:   No visits with results within 1 Day(s) from this visit. Latest known visit with results is:   No results displayed because visit has over 200 results. Radiology Results:   No results found.

## 2023-11-28 ENCOUNTER — TELEPHONE (OUTPATIENT)
Age: 63
End: 2023-11-28

## 2023-11-28 NOTE — TELEPHONE ENCOUNTER
Sister was provided fax number to get patient's previous medical records from [facility] for continuation of treatment of skin condition for Provider to review history. Office fax is  [phone]. She will try to get records before up coming NP appt.

## 2023-12-13 ENCOUNTER — OFFICE VISIT (OUTPATIENT)
Dept: FAMILY MEDICINE CLINIC | Facility: CLINIC | Age: 63
End: 2023-12-13
Payer: MEDICARE

## 2023-12-13 VITALS
SYSTOLIC BLOOD PRESSURE: 118 MMHG | BODY MASS INDEX: 25.66 KG/M2 | OXYGEN SATURATION: 97 % | WEIGHT: 109.4 LBS | TEMPERATURE: 98.8 F | RESPIRATION RATE: 16 BRPM | DIASTOLIC BLOOD PRESSURE: 72 MMHG | HEART RATE: 77 BPM

## 2023-12-13 DIAGNOSIS — J06.9 UPPER RESPIRATORY TRACT INFECTION, UNSPECIFIED TYPE: Primary | ICD-10-CM

## 2023-12-13 DIAGNOSIS — Z20.822 SUSPECTED COVID-19 VIRUS INFECTION: ICD-10-CM

## 2023-12-13 DIAGNOSIS — J20.9 ACUTE BRONCHITIS, UNSPECIFIED ORGANISM: ICD-10-CM

## 2023-12-13 PROCEDURE — 87636 SARSCOV2 & INF A&B AMP PRB: CPT | Performed by: FAMILY MEDICINE

## 2023-12-13 PROCEDURE — 99213 OFFICE O/P EST LOW 20 MIN: CPT | Performed by: FAMILY MEDICINE

## 2023-12-13 RX ORDER — AZITHROMYCIN 250 MG/1
TABLET, FILM COATED ORAL DAILY
Qty: 6 TABLET | Refills: 0 | Status: SHIPPED | OUTPATIENT
Start: 2023-12-13 | End: 2023-12-18

## 2023-12-13 RX ORDER — PREDNISONE 20 MG/1
20 TABLET ORAL 2 TIMES DAILY WITH MEALS
Qty: 10 TABLET | Refills: 0 | Status: SHIPPED | OUTPATIENT
Start: 2023-12-13

## 2023-12-13 NOTE — PROGRESS NOTES
Assessment/Plan:    Upper respiratory tract infection  - discussed rest, plenty of fluids, Tylenol as needed for body aches or headache, Mucinex as needed for cough/ congestion, will obtain Covid/ flu PCR swab and call with the results once available  - Covid/Flu- Office Collect     Acute bronchitis  - advised to start prednisone, Z-eleanor and use Albuterol neb treatment every 4-6 hours as needed for wheezing or chest tightness, encouraged to contact the office for persistent or worsening symptoms  - predniSONE 20 mg tablet; Take 1 tablet (20 mg total) by mouth 2 (two) times a day with meals  Dispense: 10 tablet; Refill: 0  - azithromycin (Zithromax) 250 mg tablet; Take 2 tablets (500 mg total) by mouth daily for 1 day, THEN 1 tablet (250 mg total) daily for 4 days. Dispense: 6 tablet; Refill: 0         Return as scheduled or sooner as needed. The treatment plan and possible side effects of new medications were reviewed with the patient's sister today, she understands and agrees with the treatment plan. Subjective:   Chief Complaint   Patient presents with    Cough    Fatigue    Generalized Body Aches      Patient ID: Gladys Ramsay is a 59 y.o. male who presents today with his sister with c/o nasal congestion, fatigue, body aches, headache, cough and chest congestion for the past 3-4 days, he is also now having wheezing, frequent throat clearing and his cough is getting worse, no fever or chills, no chest pain or shortness of breath. His home pulse ox has been ranging in 95-96%, he uses oxygen 2 L/min at night time. His home Covid test yesterday was negative.         The following portions of the patient's history were reviewed and updated as appropriate: allergies, current medications, past family history, past medical history, past social history, past surgical history and problem list.    Past Medical History:   Diagnosis Date    Depression     GERD (gastroesophageal reflux disease)     Intellectual disability Scoliosis, unspecified scoliosis type, unspecified spinal region      Past Surgical History:   Procedure Laterality Date    APPENDECTOMY      CHOLECYSTECTOMY      HERNIA REPAIR      PANCREAS SURGERY N/A     Removal of a mass perhaps 2018    PROSTATE SURGERY N/A     "Laser prostate surgery "     Family History   Problem Relation Age of Onset    Heart disease Mother     Parkinsonism Mother     Heart disease Father     Alcohol abuse Neg Hx     Substance Abuse Neg Hx     Mental illness Neg Hx      Social History     Socioeconomic History    Marital status: Single     Spouse name: Not on file    Number of children: 0    Years of education: Not on file    Highest education level: Not on file   Occupational History    Not on file   Tobacco Use    Smoking status: Never     Passive exposure: Never    Smokeless tobacco: Never   Vaping Use    Vaping status: Never Used   Substance and Sexual Activity    Alcohol use: Not Currently    Drug use: Never    Sexual activity: Not on file   Other Topics Concern    Not on file   Social History Narrative    Not on file     Social Determinants of Health     Financial Resource Strain: Low Risk  (8/10/2023)    Overall Financial Resource Strain (CARDIA)     Difficulty of Paying Living Expenses: Not very hard   Food Insecurity: No Food Insecurity (6/23/2023)    Hunger Vital Sign     Worried About Running Out of Food in the Last Year: Never true     Ran Out of Food in the Last Year: Never true   Transportation Needs: No Transportation Needs (8/10/2023)    PRAPARE - Transportation     Lack of Transportation (Medical): No     Lack of Transportation (Non-Medical):  No   Physical Activity: Not on file   Stress: Not on file   Social Connections: Not on file   Intimate Partner Violence: Not on file   Housing Stability: Low Risk  (6/23/2023)    Housing Stability Vital Sign     Unable to Pay for Housing in the Last Year: No     Number of Places Lived in the Last Year: 1     Unstable Housing in the Last Year: No       Current Outpatient Medications:     acetaminophen (TYLENOL) 325 mg tablet, Take 2 tablets (650 mg total) by mouth every 4 (four) hours as needed for moderate pain or fever, Disp: , Rfl:     albuterol (2.5 mg/3 mL) 0.083 % nebulizer solution, Take 3 mL (2.5 mg total) by nebulization 2 (two) times a day, Disp: 75 mL, Rfl: 5    buPROPion (WELLBUTRIN XL) 150 mg 24 hr tablet, TAKE 1 TABLET BY MOUTH EVERY DAY IN THE MORNING, Disp: 90 tablet, Rfl: 2    calcium carbonate (TUMS ULTRA) 1000 MG chewable tablet, Chew 1 tablet (1,000 mg total) 2 (two) times a day, Disp: , Rfl: 0    dextromethorphan-guaifenesin (MUCINEX DM)  MG per 12 hr tablet, Take 1 tablet by mouth as needed for cough, Disp: , Rfl:     loratadine (CLARITIN) 10 mg tablet, Take 1 tablet (10 mg total) by mouth daily as needed for allergies, Disp: , Rfl:     metoprolol succinate (TOPROL-XL) 25 mg 24 hr tablet, Take 1 tablet (25 mg total) by mouth daily, Disp: 90 tablet, Rfl: 3    olopatadine (PATANOL) 0.1 % ophthalmic solution, Administer 1 drop to both eyes 2 (two) times a day as needed for allergies, Disp: , Rfl:     omeprazole (PriLOSEC) 40 MG capsule, Take 1 capsule (40 mg total) by mouth daily, Disp: 90 capsule, Rfl: 3    polyethylene glycol (MIRALAX) 17 g packet, Take 17 g by mouth daily, Disp: , Rfl:     simvastatin (ZOCOR) 40 mg tablet, Take 1 tablet (40 mg total) by mouth daily at bedtime, Disp: 90 tablet, Rfl: 3    Review of Systems   Constitutional:  Positive for fatigue. Negative for chills and fever. HENT:  Positive for congestion and postnasal drip. Negative for ear pain and sore throat. Respiratory:  Positive for cough and wheezing. Negative for shortness of breath. Cardiovascular:  Negative for chest pain and palpitations. Gastrointestinal:  Negative for abdominal pain, diarrhea, nausea and vomiting. Neurological:  Positive for headaches. Negative for dizziness. Hematological:  Negative for adenopathy. Objective:    Vitals:    12/13/23 1456   BP: 118/72   Pulse: 77   Resp: 16   Temp: 98.8 °F (37.1 °C)   SpO2: 97%   Weight: 49.6 kg (109 lb 6.4 oz)        Physical Exam  Constitutional:       General: He is not in acute distress. HENT:      Right Ear: Tympanic membrane and ear canal normal.      Left Ear: Tympanic membrane and ear canal normal.      Nose: Congestion present. Mouth/Throat:      Pharynx: No oropharyngeal exudate or posterior oropharyngeal erythema. Cardiovascular:      Rate and Rhythm: Normal rate and regular rhythm. Heart sounds: Normal heart sounds. Pulmonary:      Effort: Pulmonary effort is normal. No respiratory distress. Comments: Bilateral rhonchi in upper lung fields, no rales  Neurological:      Mental Status: He is alert.    Psychiatric:         Mood and Affect: Mood normal.

## 2023-12-14 ENCOUNTER — TELEPHONE (OUTPATIENT)
Dept: FAMILY MEDICINE CLINIC | Facility: CLINIC | Age: 63
End: 2023-12-14

## 2023-12-14 LAB
FLUAV RNA RESP QL NAA+PROBE: NEGATIVE
FLUBV RNA RESP QL NAA+PROBE: NEGATIVE
SARS-COV-2 RNA RESP QL NAA+PROBE: NEGATIVE

## 2023-12-14 NOTE — TELEPHONE ENCOUNTER
----- Message from Echo Gilliam MD sent at 12/14/2023  2:59 PM EST -----  Please let patient's sister know that his Covid/ flu swab was negative.

## 2023-12-20 ENCOUNTER — HOSPITAL ENCOUNTER (EMERGENCY)
Facility: HOSPITAL | Age: 63
Discharge: HOME | End: 2023-12-20
Attending: EMERGENCY MEDICINE
Payer: MEDICARE

## 2023-12-20 ENCOUNTER — OFFICE VISIT (OUTPATIENT)
Dept: URGENT CARE | Facility: CLINIC | Age: 63
End: 2023-12-20
Payer: MEDICARE

## 2023-12-20 ENCOUNTER — APPOINTMENT (EMERGENCY)
Dept: RADIOLOGY | Facility: HOSPITAL | Age: 63
End: 2023-12-20
Payer: MEDICARE

## 2023-12-20 VITALS
OXYGEN SATURATION: 97 % | RESPIRATION RATE: 18 BRPM | DIASTOLIC BLOOD PRESSURE: 72 MMHG | TEMPERATURE: 97 F | WEIGHT: 109 LBS | BODY MASS INDEX: 25.57 KG/M2 | HEART RATE: 74 BPM | SYSTOLIC BLOOD PRESSURE: 128 MMHG

## 2023-12-20 VITALS
HEART RATE: 83 BPM | OXYGEN SATURATION: 95 % | SYSTOLIC BLOOD PRESSURE: 158 MMHG | DIASTOLIC BLOOD PRESSURE: 94 MMHG | RESPIRATION RATE: 21 BRPM | TEMPERATURE: 97.6 F

## 2023-12-20 DIAGNOSIS — F79 INTELLECTUAL DISABILITY: ICD-10-CM

## 2023-12-20 DIAGNOSIS — J40 BRONCHITIS: Primary | ICD-10-CM

## 2023-12-20 DIAGNOSIS — R07.9 CHEST PAIN, UNSPECIFIED TYPE: Primary | ICD-10-CM

## 2023-12-20 LAB
ALBUMIN SERPL BCP-MCNC: 4.2 G/DL (ref 3.5–5)
ALP SERPL-CCNC: 125 U/L (ref 34–104)
ALT SERPL W P-5'-P-CCNC: 55 U/L (ref 7–52)
ANION GAP SERPL CALCULATED.3IONS-SCNC: 7 MMOL/L
APTT PPP: 22 SECONDS (ref 23–37)
AST SERPL W P-5'-P-CCNC: 22 U/L (ref 13–39)
BACTERIA UR QL AUTO: NORMAL /HPF
BASOPHILS # BLD AUTO: 0.04 THOUSANDS/ÂΜL (ref 0–0.1)
BASOPHILS NFR BLD AUTO: 0 % (ref 0–1)
BILIRUB SERPL-MCNC: 0.75 MG/DL (ref 0.2–1)
BILIRUB UR QL STRIP: NEGATIVE
BUN SERPL-MCNC: 14 MG/DL (ref 5–25)
CALCIUM SERPL-MCNC: 9.6 MG/DL (ref 8.4–10.2)
CARDIAC TROPONIN I PNL SERPL HS: <2 NG/L
CHLORIDE SERPL-SCNC: 102 MMOL/L (ref 96–108)
CLARITY UR: CLEAR
CO2 SERPL-SCNC: 27 MMOL/L (ref 21–32)
COLOR UR: YELLOW
CREAT SERPL-MCNC: 0.72 MG/DL (ref 0.6–1.3)
D DIMER PPP FEU-MCNC: 0.29 UG/ML FEU
EOSINOPHIL # BLD AUTO: 0.1 THOUSAND/ÂΜL (ref 0–0.61)
EOSINOPHIL NFR BLD AUTO: 1 % (ref 0–6)
ERYTHROCYTE [DISTWIDTH] IN BLOOD BY AUTOMATED COUNT: 12 % (ref 11.6–15.1)
FLUAV RNA RESP QL NAA+PROBE: NEGATIVE
FLUBV RNA RESP QL NAA+PROBE: NEGATIVE
GFR SERPL CREATININE-BSD FRML MDRD: 99 ML/MIN/1.73SQ M
GLUCOSE SERPL-MCNC: 154 MG/DL (ref 65–140)
GLUCOSE UR STRIP-MCNC: ABNORMAL MG/DL
HCT VFR BLD AUTO: 52.5 % (ref 36.5–49.3)
HGB BLD-MCNC: 16.4 G/DL (ref 12–17)
HGB UR QL STRIP.AUTO: NEGATIVE
IMM GRANULOCYTES # BLD AUTO: 0.16 THOUSAND/UL (ref 0–0.2)
IMM GRANULOCYTES NFR BLD AUTO: 1 % (ref 0–2)
INR PPP: 1.01 (ref 0.84–1.19)
KETONES UR STRIP-MCNC: ABNORMAL MG/DL
LACTATE SERPL-SCNC: 1.1 MMOL/L (ref 0.5–2)
LEUKOCYTE ESTERASE UR QL STRIP: NEGATIVE
LYMPHOCYTES # BLD AUTO: 1.19 THOUSANDS/ÂΜL (ref 0.6–4.47)
LYMPHOCYTES NFR BLD AUTO: 6 % (ref 14–44)
MCH RBC QN AUTO: 30.4 PG (ref 26.8–34.3)
MCHC RBC AUTO-ENTMCNC: 31.2 G/DL (ref 31.4–37.4)
MCV RBC AUTO: 97 FL (ref 82–98)
MONOCYTES # BLD AUTO: 1.16 THOUSAND/ÂΜL (ref 0.17–1.22)
MONOCYTES NFR BLD AUTO: 6 % (ref 4–12)
NEUTROPHILS # BLD AUTO: 17.46 THOUSANDS/ÂΜL (ref 1.85–7.62)
NEUTS SEG NFR BLD AUTO: 86 % (ref 43–75)
NITRITE UR QL STRIP: NEGATIVE
NON-SQ EPI CELLS URNS QL MICRO: NORMAL /HPF
NRBC BLD AUTO-RTO: 0 /100 WBCS
PH UR STRIP.AUTO: 6 [PH]
PLATELET # BLD AUTO: 151 THOUSANDS/UL (ref 149–390)
PMV BLD AUTO: 10.6 FL (ref 8.9–12.7)
POTASSIUM SERPL-SCNC: 4.4 MMOL/L (ref 3.5–5.3)
PROCALCITONIN SERPL-MCNC: 0.05 NG/ML
PROT SERPL-MCNC: 6.6 G/DL (ref 6.4–8.4)
PROT UR STRIP-MCNC: ABNORMAL MG/DL
PROTHROMBIN TIME: 13.8 SECONDS (ref 11.6–14.5)
RBC # BLD AUTO: 5.4 MILLION/UL (ref 3.88–5.62)
RBC #/AREA URNS AUTO: NORMAL /HPF
RSV RNA RESP QL NAA+PROBE: NEGATIVE
SARS-COV-2 RNA RESP QL NAA+PROBE: NEGATIVE
SODIUM SERPL-SCNC: 136 MMOL/L (ref 135–147)
SP GR UR STRIP.AUTO: >=1.03 (ref 1–1.03)
UROBILINOGEN UR STRIP-ACNC: <2 MG/DL
WBC # BLD AUTO: 20.11 THOUSAND/UL (ref 4.31–10.16)
WBC #/AREA URNS AUTO: NORMAL /HPF

## 2023-12-20 PROCEDURE — 81001 URINALYSIS AUTO W/SCOPE: CPT

## 2023-12-20 PROCEDURE — 80053 COMPREHEN METABOLIC PANEL: CPT | Performed by: EMERGENCY MEDICINE

## 2023-12-20 PROCEDURE — 93005 ELECTROCARDIOGRAM TRACING: CPT | Performed by: PHYSICIAN ASSISTANT

## 2023-12-20 PROCEDURE — 36415 COLL VENOUS BLD VENIPUNCTURE: CPT

## 2023-12-20 PROCEDURE — 99285 EMERGENCY DEPT VISIT HI MDM: CPT

## 2023-12-20 PROCEDURE — G0463 HOSPITAL OUTPT CLINIC VISIT: HCPCS | Performed by: PHYSICIAN ASSISTANT

## 2023-12-20 PROCEDURE — 84484 ASSAY OF TROPONIN QUANT: CPT | Performed by: EMERGENCY MEDICINE

## 2023-12-20 PROCEDURE — 99213 OFFICE O/P EST LOW 20 MIN: CPT | Performed by: PHYSICIAN ASSISTANT

## 2023-12-20 PROCEDURE — 85379 FIBRIN DEGRADATION QUANT: CPT

## 2023-12-20 PROCEDURE — 85730 THROMBOPLASTIN TIME PARTIAL: CPT

## 2023-12-20 PROCEDURE — 85025 COMPLETE CBC W/AUTO DIFF WBC: CPT | Performed by: EMERGENCY MEDICINE

## 2023-12-20 PROCEDURE — 93005 ELECTROCARDIOGRAM TRACING: CPT

## 2023-12-20 PROCEDURE — 0241U HB NFCT DS VIR RESP RNA 4 TRGT: CPT | Performed by: EMERGENCY MEDICINE

## 2023-12-20 PROCEDURE — 96360 HYDRATION IV INFUSION INIT: CPT

## 2023-12-20 PROCEDURE — 85610 PROTHROMBIN TIME: CPT

## 2023-12-20 PROCEDURE — 71045 X-RAY EXAM CHEST 1 VIEW: CPT

## 2023-12-20 PROCEDURE — 84145 PROCALCITONIN (PCT): CPT

## 2023-12-20 PROCEDURE — 83605 ASSAY OF LACTIC ACID: CPT

## 2023-12-20 PROCEDURE — 87040 BLOOD CULTURE FOR BACTERIA: CPT

## 2023-12-20 PROCEDURE — 96361 HYDRATE IV INFUSION ADD-ON: CPT

## 2023-12-20 RX ORDER — AMOXICILLIN AND CLAVULANATE POTASSIUM 875; 125 MG/1; MG/1
1 TABLET, FILM COATED ORAL ONCE
Status: COMPLETED | OUTPATIENT
Start: 2023-12-20 | End: 2023-12-20

## 2023-12-20 RX ORDER — AMOXICILLIN AND CLAVULANATE POTASSIUM 875; 125 MG/1; MG/1
1 TABLET, FILM COATED ORAL EVERY 12 HOURS
Qty: 14 TABLET | Refills: 0 | Status: SHIPPED | OUTPATIENT
Start: 2023-12-20 | End: 2023-12-27

## 2023-12-20 RX ADMIN — SODIUM CHLORIDE 1000 ML: 0.9 INJECTION, SOLUTION INTRAVENOUS at 21:09

## 2023-12-20 RX ADMIN — AMOXICILLIN AND CLAVULANATE POTASSIUM 1 TABLET: 875; 125 TABLET, COATED ORAL at 23:19

## 2023-12-21 LAB
ATRIAL RATE: 72 BPM
ATRIAL RATE: 72 BPM
P AXIS: 23 DEGREES
P AXIS: 23 DEGREES
PR INTERVAL: 146 MS
PR INTERVAL: 146 MS
QRS AXIS: -40 DEGREES
QRS AXIS: -40 DEGREES
QRSD INTERVAL: 82 MS
QRSD INTERVAL: 82 MS
QT INTERVAL: 398 MS
QT INTERVAL: 398 MS
QTC INTERVAL: 435 MS
QTC INTERVAL: 435 MS
T WAVE AXIS: 68 DEGREES
T WAVE AXIS: 68 DEGREES
VENTRICULAR RATE: 72 BPM
VENTRICULAR RATE: 72 BPM

## 2023-12-21 PROCEDURE — 93010 ELECTROCARDIOGRAM REPORT: CPT | Performed by: PHYSICIAN ASSISTANT

## 2023-12-21 NOTE — PROGRESS NOTES
[facility]        NAME: Jhony Bruno is a 63 y.o. male  : 1960    MRN: [id number]  DATE: 2023  TIME: 4:11 PM    Assessment and Plan   Chest pain, unspecified type [R07.9]  1. Chest pain, unspecified type  ECG 12 lead    Transfer to other facility            Patient Instructions       Follow up with PCP in 3-5 days.  Proceed to  ER if symptoms worsen.    Chief Complaint     Chief Complaint   Patient presents with    Fatigue    GERD    Anorexia     Patient states that he was here on  got a zpack took whole treatment. Medication has helped with cold like symptoms but patient has been feeling increasingly fatigued, decrease in appetite for about a week. Wants to be checked out to make sure everything is okay.         History of Present Illness       63-year-old male presents with chest discomfort fatigue and has not been acting normally as per caregiver.  Patient caregiver reports has not been feeling well for the past several days.  No fevers reported.  No vomiting or diarrhea.  Patient does have a history of GERD and has been giving his medications as usual but continues to have chest discomfort.  Patient has been sleeping and laying around all day.    Fatigue  This is a new problem. The current episode started in the past 7 days. The problem occurs constantly. The problem has been unchanged. Associated symptoms include chest pain and fatigue. Pertinent negatives include no abdominal pain, chills, congestion, fever, nausea or vomiting. Nothing aggravates the symptoms. He has tried rest for the symptoms. The treatment provided no relief.       Review of Systems   Review of Systems   Unable to perform ROS: Other   Constitutional:  Positive for activity change, appetite change and fatigue. Negative for chills and fever.   HENT:  Negative for congestion.    Cardiovascular:  Positive for chest pain.   Gastrointestinal:  Negative for abdominal pain, nausea and vomiting.         Current  Medications       Current Outpatient Medications:     acetaminophen (TYLENOL) 325 mg tablet, Take 2 tablets (650 mg total) by mouth every 4 (four) hours as needed for moderate pain or fever, Disp: , Rfl:     albuterol (2.5 mg/3 mL) 0.083 % nebulizer solution, Take 3 mL (2.5 mg total) by nebulization 2 (two) times a day, Disp: 75 mL, Rfl: 5    amoxicillin-clavulanate (AUGMENTIN) 875-125 mg per tablet, Take 1 tablet by mouth every 12 (twelve) hours for 7 days, Disp: 14 tablet, Rfl: 0    buPROPion (WELLBUTRIN XL) 150 mg 24 hr tablet, TAKE 1 TABLET BY MOUTH EVERY DAY IN THE MORNING, Disp: 90 tablet, Rfl: 2    calcium carbonate (TUMS ULTRA) 1000 MG chewable tablet, Chew 1 tablet (1,000 mg total) 2 (two) times a day, Disp: , Rfl: 0    dextromethorphan-guaifenesin (MUCINEX DM)  MG per 12 hr tablet, Take 1 tablet by mouth as needed for cough, Disp: , Rfl:     loratadine (CLARITIN) 10 mg tablet, Take 1 tablet (10 mg total) by mouth daily as needed for allergies, Disp: , Rfl:     metoprolol succinate (TOPROL-XL) 25 mg 24 hr tablet, Take 1 tablet (25 mg total) by mouth daily, Disp: 90 tablet, Rfl: 3    olopatadine (PATANOL) 0.1 % ophthalmic solution, Administer 1 drop to both eyes 2 (two) times a day as needed for allergies, Disp: , Rfl:     omeprazole (PriLOSEC) 40 MG capsule, Take 1 capsule (40 mg total) by mouth daily, Disp: 90 capsule, Rfl: 3    polyethylene glycol (MIRALAX) 17 g packet, Take 17 g by mouth daily, Disp: , Rfl:     predniSONE 20 mg tablet, Take 1 tablet (20 mg total) by mouth 2 (two) times a day with meals, Disp: 10 tablet, Rfl: 0    simvastatin (ZOCOR) 40 mg tablet, Take 1 tablet (40 mg total) by mouth daily at bedtime, Disp: 90 tablet, Rfl: 3  No current facility-administered medications for this visit.    Current Allergies     Allergies as of 12/20/2023 - Reviewed 12/20/2023   Allergen Reaction Noted    Levofloxacin Itching 10/14/2016    Codeine Nausea Only 11/30/2022            The following  "portions of the patient's history were reviewed and updated as appropriate: allergies, current medications, past family history, past medical history, past social history, past surgical history and problem list.     Past Medical History:   Diagnosis Date    Depression     GERD (gastroesophageal reflux disease)     Intellectual disability     Scoliosis, unspecified scoliosis type, unspecified spinal region        Past Surgical History:   Procedure Laterality Date    APPENDECTOMY      CHOLECYSTECTOMY      HERNIA REPAIR      PANCREAS SURGERY N/A     Removal of a mass perhaps 2018    PROSTATE SURGERY N/A     \"Laser prostate surgery \"       Family History   Problem Relation Age of Onset    Heart disease Mother     Parkinsonism Mother     Heart disease Father     Alcohol abuse Neg Hx     Substance Abuse Neg Hx     Mental illness Neg Hx          Medications have been verified.        Objective   /72   Pulse 74   Temp (!) 97 °F (36.1 °C) (Tympanic)   Resp 18   Wt 49.4 kg (109 lb)   SpO2 97%   BMI 25.57 kg/m²   No LMP for male patient.       Physical Exam     Physical Exam  Vitals and nursing note reviewed.   Constitutional:       General: He is not in acute distress.     Appearance: Normal appearance. He is well-developed.   HENT:      Head: Normocephalic and atraumatic.      Right Ear: Hearing, tympanic membrane, ear canal and external ear normal. There is no impacted cerumen.      Left Ear: Hearing, tympanic membrane, ear canal and external ear normal. There is no impacted cerumen.      Nose: Nose normal.      Mouth/Throat:      Pharynx: Uvula midline. No oropharyngeal exudate.   Eyes:      General:         Right eye: No discharge.         Left eye: No discharge.      Conjunctiva/sclera: Conjunctivae normal.   Cardiovascular:      Rate and Rhythm: Normal rate and regular rhythm.      Heart sounds: Normal heart sounds. No murmur heard.  Pulmonary:      Effort: Pulmonary effort is normal. No respiratory " distress.      Breath sounds: Normal breath sounds. No wheezing or rales.   Abdominal:      General: Bowel sounds are normal.      Palpations: Abdomen is soft.      Tenderness: There is no abdominal tenderness.   Musculoskeletal:         General: Normal range of motion.      Cervical back: Normal range of motion and neck supple.   Lymphadenopathy:      Cervical: No cervical adenopathy.   Skin:     General: Skin is warm and dry.   Neurological:      Mental Status: He is alert and oriented to person, place, and time.   Psychiatric:         Mood and Affect: Mood normal.             EKG reviewed: Normal sinus rhythm 72 bpm.  No ST elevations or depressions noted.  Compared versus previous.  Independently reviewed by myself.    MDM: Reviewed past medical charts.  Due to patient's past medical history age and chief complaint I feel that he needs a higher level of care.  Patient was sent to ED for evaluation.  Offered EMS transport but caregiver said that they would transport him to the ED to be evaluated.  Concerned about ACS.

## 2023-12-21 NOTE — DISCHARGE INSTRUCTIONS
Make sure patient is eating/drinking fluids  Give ibuprofen/tylenol as needed  Augmentin twice a day for a week  F/u with PCP if symptoms persist  Return to ER if symptoms worsen

## 2023-12-21 NOTE — ED PROVIDER NOTES
History  Chief Complaint   Patient presents with    Chest Pain     Lethargic & poor PO intake x 1 week. Having chest pain x 3 days. States it was hurting when he ate, now its hurting all the time. Thinks its possibly GERD.Had trachiobronchitis about a week ago. Was on a zpack. Seemed to resolved. But now energy level decreased.     This is a 62 y/o male with PMH GERD, intellectual disability, severe scoliosis who presents to the ER today with his sister for increased lethargy, cough x 1 week. Patient mostly nonverbal so sister provided history. She states they took him to urgent care one week ago where he was diagnosed with bronchitis. The cough was actually getting better on steroids and zpack however he has been more tired lately. He has not been eating or drinking as much as normal, seems a little dry to her. She also states he has been complaining of chest pain when he walks or eats and this is new. She denies him having any fevers,vomiting, leg swelling, coughing up blood, changes in urination or bowel habits. He does have a history of aspiration pneumonia and was admitted here in June for it.       History provided by:  Patient   used: No        Prior to Admission Medications   Prescriptions Last Dose Informant Patient Reported? Taking?   acetaminophen (TYLENOL) 325 mg tablet   No No   Sig: Take 2 tablets (650 mg total) by mouth every 4 (four) hours as needed for moderate pain or fever   albuterol (2.5 mg/3 mL) 0.083 % nebulizer solution   No No   Sig: Take 3 mL (2.5 mg total) by nebulization 2 (two) times a day   buPROPion (WELLBUTRIN XL) 150 mg 24 hr tablet   No No   Sig: TAKE 1 TABLET BY MOUTH EVERY DAY IN THE MORNING   calcium carbonate (TUMS ULTRA) 1000 MG chewable tablet   No No   Sig: Chew 1 tablet (1,000 mg total) 2 (two) times a day   dextromethorphan-guaifenesin (MUCINEX DM)  MG per 12 hr tablet   Yes No   Sig: Take 1 tablet by mouth as needed for cough   loratadine  "(CLARITIN) 10 mg tablet   No No   Sig: Take 1 tablet (10 mg total) by mouth daily as needed for allergies   metoprolol succinate (TOPROL-XL) 25 mg 24 hr tablet   No No   Sig: Take 1 tablet (25 mg total) by mouth daily   olopatadine (PATANOL) 0.1 % ophthalmic solution   No No   Sig: Administer 1 drop to both eyes 2 (two) times a day as needed for allergies   omeprazole (PriLOSEC) 40 MG capsule   No No   Sig: Take 1 capsule (40 mg total) by mouth daily   polyethylene glycol (MIRALAX) 17 g packet   Yes No   Sig: Take 17 g by mouth daily   predniSONE 20 mg tablet   No No   Sig: Take 1 tablet (20 mg total) by mouth 2 (two) times a day with meals   simvastatin (ZOCOR) 40 mg tablet   No No   Sig: Take 1 tablet (40 mg total) by mouth daily at bedtime      Facility-Administered Medications: None       Past Medical History:   Diagnosis Date    Depression     GERD (gastroesophageal reflux disease)     Intellectual disability     Scoliosis, unspecified scoliosis type, unspecified spinal region        Past Surgical History:   Procedure Laterality Date    APPENDECTOMY      CHOLECYSTECTOMY      HERNIA REPAIR      PANCREAS SURGERY N/A     Removal of a mass perhaps 2018    PROSTATE SURGERY N/A     \"Laser prostate surgery \"       Family History   Problem Relation Age of Onset    Heart disease Mother     Parkinsonism Mother     Heart disease Father     Alcohol abuse Neg Hx     Substance Abuse Neg Hx     Mental illness Neg Hx      I have reviewed and agree with the history as documented.    E-Cigarette/Vaping    E-Cigarette Use Never User      E-Cigarette/Vaping Substances    Nicotine No     THC No     CBD No     Flavoring No     Other No     Unknown No      Social History     Tobacco Use    Smoking status: Never     Passive exposure: Never    Smokeless tobacco: Never   Vaping Use    Vaping status: Never Used   Substance Use Topics    Alcohol use: Not Currently    Drug use: Never       Review of Systems   Unable to perform ROS: Patient " nonverbal       Physical Exam  Physical Exam  Vitals and nursing note reviewed.   Constitutional:       Appearance: He is well-developed and normal weight.      Comments: Patient is nonverbal at baseline   HENT:      Head: Normocephalic and atraumatic.      Comments: Dry mucus membranes  Cardiovascular:      Rate and Rhythm: Normal rate and regular rhythm.      Heart sounds: Normal heart sounds, S1 normal and S2 normal.   Pulmonary:      Effort: Pulmonary effort is normal.      Breath sounds: Normal breath sounds and air entry. No decreased breath sounds, wheezing or rhonchi.      Comments: O2 sats > 90% on RA  Musculoskeletal:      Comments: Severe scoliosis    Neurological:      Mental Status: He is alert.         Vital Signs  ED Triage Vitals [12/20/23 1951]   Temperature Pulse Respirations Blood Pressure SpO2   97.6 °F (36.4 °C) 71 16 136/80 95 %      Temp Source Heart Rate Source Patient Position - Orthostatic VS BP Location FiO2 (%)   Temporal Monitor Sitting Left arm --      Pain Score       --           Vitals:    12/20/23 2106 12/20/23 2200 12/20/23 2231 12/20/23 2232   BP: 127/72 138/72 158/94    Pulse: 74 73  83   Patient Position - Orthostatic VS:             Visual Acuity      ED Medications  Medications   sodium chloride 0.9 % bolus 1,000 mL (0 mL Intravenous Stopped 12/20/23 2328)   amoxicillin-clavulanate (AUGMENTIN) 875-125 mg per tablet 1 tablet (1 tablet Oral Given 12/20/23 2319)       Diagnostic Studies  Results Reviewed       Procedure Component Value Units Date/Time    Urine Microscopic [[id number]]  (Normal) Collected: 12/20/23 2239    Lab Status: Final result Specimen: Urine, Clean Catch Updated: 12/20/23 2254     RBC, UA 0-1 /hpf      WBC, UA 0-1 /hpf      Epithelial Cells Occasional /hpf      Bacteria, UA None Seen /hpf     Narrative:      Microscopic performed by Serg GARCIA w Reflex to Microscopic w Reflex to Culture [[id number]]  (Abnormal) Collected: 12/20/23 2239    Lab Status: Final  result Specimen: Urine, Clean Catch Updated: 12/20/23 2247     Color, UA Yellow     Clarity, UA Clear     Specific Gravity, UA >=1.030     pH, UA 6.0     Leukocytes, UA Negative     Nitrite, UA Negative     Protein, UA Trace mg/dl      Glucose,  (3/10%) mg/dl      Ketones, UA 40 (2+) mg/dl      Urobilinogen, UA <2.0 mg/dl      Bilirubin, UA Negative     Occult Blood, UA Negative    D-Dimer [[id number]]  (Normal) Collected: 12/20/23 2006    Lab Status: Final result Specimen: Blood from Arm, Left Updated: 12/20/23 2202     D-Dimer, Quant 0.29 ug/ml FEU     Narrative:      In the evaluation for possible pulmonary embolism, in the appropriate (Well's Score of 4 or less) patient, the age adjusted d-dimer cutoff for this patient can be calculated as:    Age x 0.01 (in ug/mL) for Age-adjusted D-dimer exclusion threshold for a patient over 50 years.    Lactic acid [[id number]]  (Normal) Collected: 12/20/23 2108    Lab Status: Final result Specimen: Blood from Arm, Left Updated: 12/20/23 2137     LACTIC ACID 1.1 mmol/L     Narrative:      Result may be elevated if tourniquet was used during collection.    Blood culture #1 [[id number]] Collected: 12/20/23 2108    Lab Status: In process Specimen: Blood from Arm, Left Updated: 12/20/23 2129    Blood culture #2 [[id number]] Collected: 12/20/23 2108    Lab Status: In process Specimen: Blood from Arm, Right Updated: 12/20/23 2128    Procalcitonin [[id number]]  (Normal) Collected: 12/20/23 2006    Lab Status: Final result Specimen: Blood from Arm, Left Updated: 12/20/23 2108     Procalcitonin 0.05 ng/ml     FLU/RSV/COVID - if FLU/RSV clinically relevant [[id number]]  (Normal) Collected: 12/20/23 2006    Lab Status: Final result Specimen: Nares from Nose Updated: 12/20/23 2052     SARS-CoV-2 Negative     INFLUENZA A PCR Negative     INFLUENZA B PCR Negative     RSV PCR Negative    Narrative:      FOR PEDIATRIC PATIENTS - copy/paste COVID Guidelines URL to browser:  [email or web address]    SARS-CoV-2 assay is a Nucleic Acid Amplification assay intended for the  qualitative detection of nucleic acid from SARS-CoV-2 in nasopharyngeal  swabs. Results are for the presumptive identification of SARS-CoV-2 RNA.    Positive results are indicative of infection with SARS-CoV-2, the virus  causing COVID-19, but do not rule out bacterial infection or co-infection  with other viruses. Laboratories within the [place] and its  territories are required to report all positive results to the appropriate  public health authorities. Negative results do not preclude SARS-CoV-2  infection and should not be used as the sole basis for treatment or other  patient management decisions. Negative results must be combined with  clinical observations, patient history, and epidemiological information.  This test has not been FDA cleared or approved.    This test has been authorized by FDA under an Emergency Use Authorization  (EUA). This test is only authorized for the duration of time the  declaration that circumstances exist justifying the authorization of the  emergency use of an in vitro diagnostic tests for detection of SARS-CoV-2  virus and/or diagnosis of COVID-19 infection under section 564(b)(1) of  the Act, 21 U.S.C. 360bbb-3(b)(1), unless the authorization is terminated  or revoked sooner. The test has been validated but independent review by FDA  and CLIA is pending.    Test performed using [employer] GeneXpert: This RT-PCR assay targets N2,  a region unique to SARS-CoV-2. A conserved region in the E-gene was chosen  for pan-Sarbecovirus detection which includes SARS-CoV-2.    According to CMS-2020-01-R, this platform meets the definition of high-throughput technology.    Protime-INR [[id number]]  (Normal) Collected: 12/20/23 2006    Lab Status: Final result Specimen: Blood from Arm, Left Updated: 12/20/23 2051     Protime 13.8 seconds       INR 1.01    APTT [[id number]]  (Abnormal) Collected: 12/20/23 2006    Lab Status: Final result Specimen: Blood from Arm, Left Updated: 12/20/23 2051     PTT 22 seconds     HS Troponin 0hr (reflex protocol) [[id number]]  (Normal) Collected: 12/20/23 2006    Lab Status: Final result Specimen: Blood from Arm, Left Updated: 12/20/23 2037     hs TnI 0hr <2 ng/L     Comprehensive metabolic panel [[id number]]  (Abnormal) Collected: 12/20/23 2006    Lab Status: Final result Specimen: Blood from Arm, Left Updated: 12/20/23 2032     Sodium 136 mmol/L      Potassium 4.4 mmol/L      Chloride 102 mmol/L      CO2 27 mmol/L      ANION GAP 7 mmol/L      BUN 14 mg/dL      Creatinine 0.72 mg/dL      Glucose 154 mg/dL      Calcium 9.6 mg/dL      AST 22 U/L      ALT 55 U/L      Alkaline Phosphatase 125 U/L      Total Protein 6.6 g/dL      Albumin 4.2 g/dL      Total Bilirubin 0.75 mg/dL      eGFR 99 ml/min/1.73sq m     Narrative:      National Kidney Disease Foundation guidelines for Chronic Kidney Disease (CKD):     Stage 1 with normal or high GFR (GFR > 90 mL/min/1.73 square meters)    Stage 2 Mild CKD (GFR = 60-89 mL/min/1.73 square meters)    Stage 3A Moderate CKD (GFR = 45-59 mL/min/1.73 square meters)    Stage 3B Moderate CKD (GFR = 30-44 mL/min/1.73 square meters)    Stage 4 Severe CKD (GFR = 15-29 mL/min/1.73 square meters)    Stage 5 End Stage CKD (GFR <15 mL/min/1.73 square meters)  Note: GFR calculation is accurate only with a steady state creatinine    CBC and differential [[id number]]  (Abnormal) Collected: 12/20/23 2006    Lab Status: Final result Specimen: Blood from Arm, Left Updated: 12/20/23 2019     WBC 20.11 Thousand/uL      RBC 5.40 Million/uL      Hemoglobin 16.4 g/dL      Hematocrit 52.5 %      MCV 97 fL      MCH 30.4 pg      MCHC 31.2 g/dL      RDW 12.0 %      MPV 10.6 fL      Platelets 151 Thousands/uL      nRBC 0 /100 WBCs      Neutrophils Relative 86 %      Immat GRANS % 1 %      Lymphocytes Relative 6 %       Monocytes Relative 6 %      Eosinophils Relative 1 %      Basophils Relative 0 %      Neutrophils Absolute 17.46 Thousands/µL      Immature Grans Absolute 0.16 Thousand/uL      Lymphocytes Absolute 1.19 Thousands/µL      Monocytes Absolute 1.16 Thousand/µL      Eosinophils Absolute 0.10 Thousand/µL      Basophils Absolute 0.04 Thousands/µL                    XR chest portable   ED Interpretation by Dodie Caraballo PA-C (12/20 2227)   No acute cardiopulmonary disease. Severe scoliosis. No obvious consolidation                 Procedures  Procedures         ED Course  ED Course as of 12/21/23 0107   Wed Dec 20, 2023   2222 WBC(!): 20.11  Patient has been on steroids past 5 days   2226 Patient feeling better with fluids             HEART Risk Score      Flowsheet Row Most Recent Value   Heart Score Risk Calculator    History 0 Filed at: 12/21/2023 0106   ECG 0 Filed at: 12/21/2023 0106   Age 1 Filed at: 12/21/2023 0106   Risk Factors 1 Filed at: 12/21/2023 0106   Troponin 0 Filed at: 12/21/2023 0106   HEART Score 2 Filed at: 12/21/2023 0106                          SBIRT 20yo+      Flowsheet Row Most Recent Value   Initial Alcohol Screen: US AUDIT-C     1. How often do you have a drink containing alcohol? 0 Filed at: 12/20/2023 1954   2. How many drinks containing alcohol do you have on a typical day you are drinking?  0 Filed at: 12/20/2023 1954   3a. Male UNDER 65: How often do you have five or more drinks on one occasion? 0 Filed at: 12/20/2023 1954   3b. FEMALE Any Age, or MALE 65+: How often do you have 4 or more drinks on one occassion? 0 Filed at: 12/20/2023 1954   Audit-C Score 0 Filed at: 12/20/2023 1954   RUBEN: How many times in the past year have you...    Used an illegal drug or used a prescription medication for non-medical reasons? Never Filed at: 12/20/2023 1954            Wells' Criteria for PE      Flowsheet Row Most Recent Value   Wells' Criteria for PE    Clinical signs and symptoms of DVT 0  Filed at: 12/21/2023 0106   PE is primary diagnosis or equally likely 0 Filed at: 12/21/2023 0106   HR >100 0 Filed at: 12/21/2023 0106   Immobilization at least 3 days or Surgery in the previous 4 weeks 0 Filed at: 12/21/2023 0106   Previous, objectively diagnosed PE or DVT 0 Filed at: 12/21/2023 0106   Hemoptysis 0 Filed at: 12/21/2023 0106   Malignancy with treatment within 6 months or palliative 0 Filed at: 12/21/2023 0106   Wells' Criteria Total 0 Filed at: 12/21/2023 0106                  Medical Decision Making  64 y/o male here for increased lethargy, decreased appetite  Differential diagnosis including but not limited to: ACS, PE, bronchitis, pneumonia, dehydration, electrolyte abnormality, metabolic disturbance, electrolyte abnormality, UTI, at risk for sepsis   Assessment: bronchitis, intellectual disability  Plan:  has elevated WBC but currently/recently on steroids. Not meeting sepsis criteria otherwise, no bandemia, negative procalcitonin. Low clinical suspicion of pneumonia however family unsure of chronic lung issues will cover with augmentin as only given zpack and advised PCP f/u. Increased fluid intake. Patients sister  was given strict return to ER precautions both verbally and in discharge papers. Patients sister verbalized understanding and agrees with plan.      Amount and/or Complexity of Data Reviewed  External Data Reviewed: notes.     Details: Admission from 6/23/23 for pneumonia  Labs: ordered. Decision-making details documented in ED Course.  Radiology: ordered and independent interpretation performed.    Risk  Prescription drug management.             Disposition  Final diagnoses:   Bronchitis   Intellectual disability     Time reflects when diagnosis was documented in both MDM as applicable and the Disposition within this note       Time User Action Codes Description Comment    12/20/2023 10:58 PM Dodie Caraballo [[email or web address]] Bronchitis     12/20/2023 10:59 PM Dodie Caraballo [[id number]]  Intellectual disability           ED Disposition       ED Disposition   Discharge    Condition   Stable    Date/Time   Wed Dec 20, 2023 2300    Comment   Jhony Damion discharge to home/self care.                   Follow-up Information       Follow up With Specialties Details Why Contact Info Additional Information    Laura Christopher,  Family Medicine Call  As needed [address]  [address]  [facility] [address]  [phone]        [facility] Emergency Department Emergency Medicine Go to  If symptoms worsen [address] [facility] [address] [phone] [facility] Emergency Department, [address] [place], [place] [address]            Discharge Medication List as of 12/20/2023 11:00 PM        START taking these medications    Details   amoxicillin-clavulanate (AUGMENTIN) 875-125 mg per tablet Take 1 tablet by mouth every 12 (twelve) hours for 7 days, Starting Wed 12/20/2023, Until Wed 12/27/2023, Normal           CONTINUE these medications which have NOT CHANGED    Details   acetaminophen (TYLENOL) 325 mg tablet Take 2 tablets (650 mg total) by mouth every 4 (four) hours as needed for moderate pain or fever, Starting Fri 2/3/2023, No Print      albuterol (2.5 mg/3 mL) 0.083 % nebulizer solution Take 3 mL (2.5 mg total) by nebulization 2 (two) times a day, Starting Thu 8/10/2023, Normal      buPROPion (WELLBUTRIN XL) 150 mg 24 hr tablet TAKE 1 TABLET BY MOUTH EVERY DAY IN THE MORNING, Starting Thu 8/24/2023, Normal      calcium carbonate (TUMS ULTRA) 1000 MG chewable tablet Chew 1 tablet (1,000 mg total) 2 (two) times a day, Starting Mon 7/3/2023, No Print      dextromethorphan-guaifenesin (MUCINEX DM)  MG per 12 hr tablet Take 1 tablet by mouth as needed for cough, Historical Med      loratadine (CLARITIN) 10 mg tablet Take 1 tablet (10 mg total) by mouth daily as needed for allergies, Starting Fri 2/3/2023, No Print       metoprolol succinate (TOPROL-XL) 25 mg 24 hr tablet Take 1 tablet (25 mg total) by mouth daily, Starting Thu 8/10/2023, Normal      olopatadine (PATANOL) 0.1 % ophthalmic solution Administer 1 drop to both eyes 2 (two) times a day as needed for allergies, Starting Thu 8/10/2023, No Print      omeprazole (PriLOSEC) 40 MG capsule Take 1 capsule (40 mg total) by mouth daily, Starting Thu 8/10/2023, Normal      polyethylene glycol (MIRALAX) 17 g packet Take 17 g by mouth daily, Historical Med      predniSONE 20 mg tablet Take 1 tablet (20 mg total) by mouth 2 (two) times a day with meals, Starting Wed 12/13/2023, Normal      simvastatin (ZOCOR) 40 mg tablet Take 1 tablet (40 mg total) by mouth daily at bedtime, Starting Thu 8/10/2023, Normal             No discharge procedures on file.    PDMP Review         Value Time User    PDMP Reviewed  Yes 6/27/2023 12:09 PM Carrie Anne PA-C            ED Provider  Electronically Signed by             Dodie Caraballo PA-C  12/21/23 0101

## 2023-12-22 LAB
ATRIAL RATE: 76 BPM
P AXIS: 32 DEGREES
PR INTERVAL: 142 MS
QRS AXIS: -46 DEGREES
QRSD INTERVAL: 86 MS
QT INTERVAL: 390 MS
QTC INTERVAL: 438 MS
T WAVE AXIS: 82 DEGREES
VENTRICULAR RATE: 76 BPM

## 2023-12-23 LAB
ATRIAL RATE: 72 BPM
P AXIS: 23 DEGREES
PR INTERVAL: 146 MS
QRS AXIS: -40 DEGREES
QRSD INTERVAL: 82 MS
QT INTERVAL: 398 MS
QTC INTERVAL: 435 MS
T WAVE AXIS: 68 DEGREES
VENTRICULAR RATE: 72 BPM

## 2023-12-24 LAB
BACTERIA BLD CULT: NORMAL
BACTERIA BLD CULT: NORMAL

## 2023-12-26 LAB
BACTERIA BLD CULT: NORMAL
BACTERIA BLD CULT: NORMAL

## 2024-01-05 ENCOUNTER — OFFICE VISIT (OUTPATIENT)
Dept: URGENT CARE | Facility: CLINIC | Age: 64
End: 2024-01-05
Payer: MEDICARE

## 2024-01-05 VITALS
HEART RATE: 85 BPM | TEMPERATURE: 97.6 F | RESPIRATION RATE: 18 BRPM | BODY MASS INDEX: 25.1 KG/M2 | OXYGEN SATURATION: 99 % | WEIGHT: 107 LBS

## 2024-01-05 DIAGNOSIS — N48.89 PENILE IRRITATION: Primary | ICD-10-CM

## 2024-01-05 DIAGNOSIS — R30.0 DYSURIA: ICD-10-CM

## 2024-01-05 LAB
SL AMB  POCT GLUCOSE, UA: NEGATIVE
SL AMB LEUKOCYTE ESTERASE,UA: NEGATIVE
SL AMB POCT BILIRUBIN,UA: NEGATIVE
SL AMB POCT BLOOD,UA: NEGATIVE
SL AMB POCT CLARITY,UA: CLEAR
SL AMB POCT COLOR,UA: NORMAL
SL AMB POCT KETONES,UA: NEGATIVE
SL AMB POCT NITRITE,UA: NEGATIVE
SL AMB POCT PH,UA: 6
SL AMB POCT SPECIFIC GRAVITY,UA: 1.01
SL AMB POCT URINE PROTEIN: NEGATIVE
SL AMB POCT UROBILINOGEN: 0.2

## 2024-01-05 PROCEDURE — 99213 OFFICE O/P EST LOW 20 MIN: CPT

## 2024-01-05 PROCEDURE — 81002 URINALYSIS NONAUTO W/O SCOPE: CPT

## 2024-01-05 PROCEDURE — 87086 URINE CULTURE/COLONY COUNT: CPT

## 2024-01-05 PROCEDURE — G0463 HOSPITAL OUTPT CLINIC VISIT: HCPCS

## 2024-01-05 RX ORDER — CLOTRIMAZOLE 1 %
CREAM (GRAM) TOPICAL 2 TIMES DAILY
Qty: 40 G | Refills: 0 | Status: SHIPPED | OUTPATIENT
Start: 2024-01-05

## 2024-01-05 NOTE — PROGRESS NOTES
[facility]        NAME: Jhony Bruno is a 63 y.o. male  : 1960    MRN: [id number]  DATE: 2024  TIME: 4:33 PM    Assessment and Plan   Penile irritation [N48.89]  1. Penile irritation  clotrimazole (LOTRIMIN) 1 % cream      2. Dysuria  POCT urine dip            Patient Instructions       Follow up with PCP in 3-5 days.  Proceed to  ER if symptoms worsen.    Chief Complaint     Chief Complaint   Patient presents with    Possible UTI     Pt presents with burning upon urination, inability to urinate x 1 day.           History of Present Illness       62 y/o M with intellectual disability presents for dysuria x 1 day. Caretaker admits its located on penile tip. Did look slightly erythematous.         Review of Systems   Review of Systems   Constitutional:  Negative for chills and fever.   Genitourinary:  Positive for dysuria and penile pain. Negative for frequency, hematuria, penile discharge and penile swelling.         Current Medications       Current Outpatient Medications:     albuterol (2.5 mg/3 mL) 0.083 % nebulizer solution, Take 3 mL (2.5 mg total) by nebulization 2 (two) times a day, Disp: 75 mL, Rfl: 5    buPROPion (WELLBUTRIN XL) 150 mg 24 hr tablet, TAKE 1 TABLET BY MOUTH EVERY DAY IN THE MORNING, Disp: 90 tablet, Rfl: 2    calcium carbonate (TUMS ULTRA) 1000 MG chewable tablet, Chew 1 tablet (1,000 mg total) 2 (two) times a day, Disp: , Rfl: 0    clotrimazole (LOTRIMIN) 1 % cream, Apply topically 2 (two) times a day, Disp: 40 g, Rfl: 0    dextromethorphan-guaifenesin (MUCINEX DM)  MG per 12 hr tablet, Take 1 tablet by mouth as needed for cough, Disp: , Rfl:     loratadine (CLARITIN) 10 mg tablet, Take 1 tablet (10 mg total) by mouth daily as needed for allergies, Disp: , Rfl:     metoprolol succinate (TOPROL-XL) 25 mg 24 hr tablet, Take 1 tablet (25 mg total) by mouth daily, Disp: 90 tablet, Rfl: 3    omeprazole (PriLOSEC) 40 MG capsule, Take 1 capsule (40 mg total)  "by mouth daily, Disp: 90 capsule, Rfl: 3    polyethylene glycol (MIRALAX) 17 g packet, Take 17 g by mouth daily, Disp: , Rfl:     simvastatin (ZOCOR) 40 mg tablet, Take 1 tablet (40 mg total) by mouth daily at bedtime, Disp: 90 tablet, Rfl: 3    acetaminophen (TYLENOL) 325 mg tablet, Take 2 tablets (650 mg total) by mouth every 4 (four) hours as needed for moderate pain or fever (Patient not taking: Reported on 1/5/2024), Disp: , Rfl:     olopatadine (PATANOL) 0.1 % ophthalmic solution, Administer 1 drop to both eyes 2 (two) times a day as needed for allergies (Patient not taking: Reported on 1/5/2024), Disp: , Rfl:     predniSONE 20 mg tablet, Take 1 tablet (20 mg total) by mouth 2 (two) times a day with meals (Patient not taking: Reported on 1/5/2024), Disp: 10 tablet, Rfl: 0    Current Allergies     Allergies as of 01/05/2024 - Reviewed 01/05/2024   Allergen Reaction Noted    Levofloxacin Itching 10/14/2016    Codeine Nausea Only 11/30/2022            The following portions of the patient's history were reviewed and updated as appropriate: allergies, current medications, past family history, past medical history, past social history, past surgical history and problem list.     Past Medical History:   Diagnosis Date    Depression     GERD (gastroesophageal reflux disease)     Intellectual disability     Scoliosis, unspecified scoliosis type, unspecified spinal region        Past Surgical History:   Procedure Laterality Date    APPENDECTOMY      CHOLECYSTECTOMY      HERNIA REPAIR      PANCREAS SURGERY N/A     Removal of a mass perhaps 2018    PROSTATE SURGERY N/A     \"Laser prostate surgery \"       Family History   Problem Relation Age of Onset    Heart disease Mother     Parkinsonism Mother     Heart disease Father     Alcohol abuse Neg Hx     Substance Abuse Neg Hx     Mental illness Neg Hx          Medications have been verified.        Objective   Pulse 85   Temp 97.6 °F (36.4 °C)   Resp 18   Wt 48.5 kg (107 " lb)   SpO2 99%   BMI 25.10 kg/m²   No LMP for male patient.       Physical Exam     Physical Exam  Vitals and nursing note reviewed.   Constitutional:       General: He is not in acute distress.     Appearance: He is not toxic-appearing.   Pulmonary:      Effort: Pulmonary effort is normal.   Abdominal:      General: There is no distension.      Palpations: Abdomen is soft.      Tenderness: There is no abdominal tenderness. There is no right CVA tenderness, left CVA tenderness or guarding.   Neurological:      Mental Status: He is alert.   Psychiatric:         Mood and Affect: Mood normal.         Behavior: Behavior normal.

## 2024-01-06 LAB — BACTERIA UR CULT: NORMAL

## 2024-02-12 ENCOUNTER — TELEPHONE (OUTPATIENT)
Dept: DERMATOLOGY | Facility: CLINIC | Age: 64
End: 2024-02-12

## 2024-02-12 NOTE — TELEPHONE ENCOUNTER
Lmom that 02/13/24 appt w/Dr Dumas has been cx & r/s due to the weather, to please call the office to confirm or r/s

## 2024-02-13 ENCOUNTER — RA CDI HCC (OUTPATIENT)
Dept: OTHER | Facility: HOSPITAL | Age: 64
End: 2024-02-13

## 2024-02-15 ENCOUNTER — TELEPHONE (OUTPATIENT)
Dept: FAMILY MEDICINE CLINIC | Facility: CLINIC | Age: 64
End: 2024-02-15

## 2024-02-15 DIAGNOSIS — E78.2 MIXED HYPERLIPIDEMIA: ICD-10-CM

## 2024-02-15 DIAGNOSIS — F41.9 ANXIETY: Primary | ICD-10-CM

## 2024-02-15 DIAGNOSIS — Z12.5 SCREENING FOR PROSTATE CANCER: ICD-10-CM

## 2024-02-15 DIAGNOSIS — F32.A DEPRESSION, UNSPECIFIED DEPRESSION TYPE: ICD-10-CM

## 2024-02-15 DIAGNOSIS — I10 PRIMARY HYPERTENSION: ICD-10-CM

## 2024-02-15 NOTE — TELEPHONE ENCOUNTER
Patient's brother-in-law called and had to reschedule Jhony's appointment for today.  He is also asking whether you would like Jhony to have bloodwork prior to his appointment?

## 2024-03-06 ENCOUNTER — APPOINTMENT (OUTPATIENT)
Dept: LAB | Facility: CLINIC | Age: 64
End: 2024-03-06
Payer: MEDICARE

## 2024-03-06 DIAGNOSIS — E78.2 MIXED HYPERLIPIDEMIA: ICD-10-CM

## 2024-03-06 DIAGNOSIS — I10 PRIMARY HYPERTENSION: ICD-10-CM

## 2024-03-06 DIAGNOSIS — Z12.5 SCREENING FOR PROSTATE CANCER: ICD-10-CM

## 2024-03-06 LAB
ALBUMIN SERPL BCP-MCNC: 4.7 G/DL (ref 3.5–5)
ALP SERPL-CCNC: 134 U/L (ref 34–104)
ALT SERPL W P-5'-P-CCNC: 19 U/L (ref 7–52)
ANION GAP SERPL CALCULATED.3IONS-SCNC: 10 MMOL/L
AST SERPL W P-5'-P-CCNC: 21 U/L (ref 13–39)
BASOPHILS # BLD AUTO: 0.08 THOUSANDS/ÂΜL (ref 0–0.1)
BASOPHILS NFR BLD AUTO: 1 % (ref 0–1)
BILIRUB SERPL-MCNC: 0.7 MG/DL (ref 0.2–1)
BUN SERPL-MCNC: 9 MG/DL (ref 5–25)
CALCIUM SERPL-MCNC: 9.7 MG/DL (ref 8.4–10.2)
CHLORIDE SERPL-SCNC: 102 MMOL/L (ref 96–108)
CHOLEST SERPL-MCNC: 150 MG/DL
CO2 SERPL-SCNC: 27 MMOL/L (ref 21–32)
CREAT SERPL-MCNC: 0.71 MG/DL (ref 0.6–1.3)
EOSINOPHIL # BLD AUTO: 0.37 THOUSAND/ÂΜL (ref 0–0.61)
EOSINOPHIL NFR BLD AUTO: 3 % (ref 0–6)
ERYTHROCYTE [DISTWIDTH] IN BLOOD BY AUTOMATED COUNT: 12.5 % (ref 11.6–15.1)
GFR SERPL CREATININE-BSD FRML MDRD: 100 ML/MIN/1.73SQ M
GLUCOSE P FAST SERPL-MCNC: 88 MG/DL (ref 65–99)
HCT VFR BLD AUTO: 52.3 % (ref 36.5–49.3)
HDLC SERPL-MCNC: 52 MG/DL
HGB BLD-MCNC: 17.3 G/DL (ref 12–17)
IMM GRANULOCYTES # BLD AUTO: 0.04 THOUSAND/UL (ref 0–0.2)
IMM GRANULOCYTES NFR BLD AUTO: 0 % (ref 0–2)
LDLC SERPL CALC-MCNC: 78 MG/DL (ref 0–100)
LYMPHOCYTES # BLD AUTO: 0.81 THOUSANDS/ÂΜL (ref 0.6–4.47)
LYMPHOCYTES NFR BLD AUTO: 7 % (ref 14–44)
MCH RBC QN AUTO: 30.5 PG (ref 26.8–34.3)
MCHC RBC AUTO-ENTMCNC: 33.1 G/DL (ref 31.4–37.4)
MCV RBC AUTO: 92 FL (ref 82–98)
MONOCYTES # BLD AUTO: 0.91 THOUSAND/ÂΜL (ref 0.17–1.22)
MONOCYTES NFR BLD AUTO: 8 % (ref 4–12)
NEUTROPHILS # BLD AUTO: 9.24 THOUSANDS/ÂΜL (ref 1.85–7.62)
NEUTS SEG NFR BLD AUTO: 81 % (ref 43–75)
NONHDLC SERPL-MCNC: 98 MG/DL
NRBC BLD AUTO-RTO: 0 /100 WBCS
PLATELET # BLD AUTO: 190 THOUSANDS/UL (ref 149–390)
PMV BLD AUTO: 11.3 FL (ref 8.9–12.7)
POTASSIUM SERPL-SCNC: 4 MMOL/L (ref 3.5–5.3)
PROT SERPL-MCNC: 7.2 G/DL (ref 6.4–8.4)
PSA SERPL-MCNC: 0.12 NG/ML (ref 0–4)
RBC # BLD AUTO: 5.68 MILLION/UL (ref 3.88–5.62)
SODIUM SERPL-SCNC: 139 MMOL/L (ref 135–147)
TRIGL SERPL-MCNC: 98 MG/DL
TSH SERPL DL<=0.05 MIU/L-ACNC: 2.63 UIU/ML (ref 0.45–4.5)
WBC # BLD AUTO: 11.45 THOUSAND/UL (ref 4.31–10.16)

## 2024-03-06 PROCEDURE — 36415 COLL VENOUS BLD VENIPUNCTURE: CPT

## 2024-03-06 PROCEDURE — 80061 LIPID PANEL: CPT

## 2024-03-06 PROCEDURE — 85025 COMPLETE CBC W/AUTO DIFF WBC: CPT

## 2024-03-06 PROCEDURE — 80053 COMPREHEN METABOLIC PANEL: CPT

## 2024-03-06 PROCEDURE — G0103 PSA SCREENING: HCPCS

## 2024-03-06 PROCEDURE — 84443 ASSAY THYROID STIM HORMONE: CPT

## 2024-03-07 ENCOUNTER — APPOINTMENT (OUTPATIENT)
Dept: LAB | Facility: CLINIC | Age: 64
End: 2024-03-07
Payer: MEDICARE

## 2024-03-07 LAB
BACTERIA UR QL AUTO: NORMAL /HPF
BILIRUB UR QL STRIP: NEGATIVE
CLARITY UR: CLEAR
COLOR UR: ABNORMAL
GLUCOSE UR STRIP-MCNC: NEGATIVE MG/DL
HGB UR QL STRIP.AUTO: NEGATIVE
KETONES UR STRIP-MCNC: NEGATIVE MG/DL
LEUKOCYTE ESTERASE UR QL STRIP: NEGATIVE
NITRITE UR QL STRIP: NEGATIVE
NON-SQ EPI CELLS URNS QL MICRO: NORMAL /HPF
PH UR STRIP.AUTO: 6 [PH]
PROT UR STRIP-MCNC: ABNORMAL MG/DL
RBC #/AREA URNS AUTO: NORMAL /HPF
SP GR UR STRIP.AUTO: 1.01 (ref 1–1.03)
UROBILINOGEN UR STRIP-ACNC: <2 MG/DL
WBC #/AREA URNS AUTO: NORMAL /HPF

## 2024-03-07 PROCEDURE — 81001 URINALYSIS AUTO W/SCOPE: CPT

## 2024-03-12 ENCOUNTER — OFFICE VISIT (OUTPATIENT)
Dept: FAMILY MEDICINE CLINIC | Facility: CLINIC | Age: 64
End: 2024-03-12
Payer: MEDICARE

## 2024-03-12 VITALS
OXYGEN SATURATION: 95 % | SYSTOLIC BLOOD PRESSURE: 114 MMHG | RESPIRATION RATE: 18 BRPM | TEMPERATURE: 97.5 F | WEIGHT: 110.1 LBS | HEIGHT: 60 IN | HEART RATE: 82 BPM | BODY MASS INDEX: 21.62 KG/M2 | DIASTOLIC BLOOD PRESSURE: 82 MMHG

## 2024-03-12 DIAGNOSIS — F41.9 ANXIETY: ICD-10-CM

## 2024-03-12 DIAGNOSIS — F79 INTELLECTUAL DISABILITY: ICD-10-CM

## 2024-03-12 DIAGNOSIS — E78.2 MIXED HYPERLIPIDEMIA: ICD-10-CM

## 2024-03-12 DIAGNOSIS — I10 PRIMARY HYPERTENSION: Primary | ICD-10-CM

## 2024-03-12 DIAGNOSIS — K21.9 GASTROESOPHAGEAL REFLUX DISEASE, UNSPECIFIED WHETHER ESOPHAGITIS PRESENT: ICD-10-CM

## 2024-03-12 DIAGNOSIS — D3A.8 NEUROENDOCRINE TUMOR OF PANCREAS: ICD-10-CM

## 2024-03-12 DIAGNOSIS — J41.8 MIXED SIMPLE AND MUCOPURULENT CHRONIC BRONCHITIS (HCC): ICD-10-CM

## 2024-03-12 DIAGNOSIS — M41.9 KYPHOSCOLIOSIS AND SCOLIOSIS: ICD-10-CM

## 2024-03-12 DIAGNOSIS — R13.10 DYSPHAGIA, UNSPECIFIED TYPE: ICD-10-CM

## 2024-03-12 DIAGNOSIS — L21.9 SEBORRHEIC DERMATITIS: ICD-10-CM

## 2024-03-12 DIAGNOSIS — C7A.8 NEUROENDOCRINE CARCINOMA (HCC): ICD-10-CM

## 2024-03-12 DIAGNOSIS — F33.42 RECURRENT MAJOR DEPRESSIVE DISORDER, IN FULL REMISSION (HCC): ICD-10-CM

## 2024-03-12 DIAGNOSIS — Q93.89 JACOBSEN SYNDROME: ICD-10-CM

## 2024-03-12 DIAGNOSIS — F32.A DEPRESSION, UNSPECIFIED DEPRESSION TYPE: ICD-10-CM

## 2024-03-12 PROCEDURE — 99215 OFFICE O/P EST HI 40 MIN: CPT | Performed by: FAMILY MEDICINE

## 2024-03-12 PROCEDURE — G2211 COMPLEX E/M VISIT ADD ON: HCPCS | Performed by: FAMILY MEDICINE

## 2024-03-12 RX ORDER — MOMETASONE FUROATE 1 MG/G
CREAM TOPICAL DAILY
Qty: 45 G | Refills: 0 | Status: SHIPPED | OUTPATIENT
Start: 2024-03-12

## 2024-03-12 RX ORDER — BUPROPION HYDROCHLORIDE 150 MG/1
150 TABLET ORAL EVERY MORNING
Qty: 90 TABLET | Refills: 2 | Status: SHIPPED | OUTPATIENT
Start: 2024-03-12

## 2024-03-12 RX ORDER — BUDESONIDE 0.5 MG/2ML
0.5 INHALANT ORAL 2 TIMES DAILY
Qty: 120 ML | Refills: 5 | Status: SHIPPED | OUTPATIENT
Start: 2024-03-12 | End: 2024-09-08

## 2024-03-12 NOTE — PATIENT INSTRUCTIONS
Tried the nebulizer twice a day sometime in the morning sometime in the evening around dinnertime  Continue the Mucinex twice daily ounces of water  This will be a good pulmonary toilet to keep things from settling inform him getting bronchitis    Continue omeprazole to keep his acid down in his stomach    See back in 6 months no blood work needed

## 2024-03-12 NOTE — PROGRESS NOTES
ASSESSMENT/PLAN:    Chronic bronchitis  Due to kyphoscoliosis  Trial of budesonide neb twice daily every day    Seborrheic keratosis  Continue alternating bottles of Selsun Blue with bottles of head and shoulders  Mometasone to his face hotspots every night    Kyphosis and scoliosis causing mechanical decrease in lung volumes  probably microscopic aspiration  Had hypoxic episode 6-minute walk but recovered quickly  Sister did experiment to see if it made any difference with or without the oxygen and appears he just likes the sound machine  Patient is now eating Chucky is not distracted so he does not cough    GERD  Continue omeprazole and take his time eating  Did very well with speech therapy and is now slowly eating and not aspirating      Restless leg  Tums Ultra every night at dinnertime  Restless legs have disappeared     Hypertension  Continue Toprol  14/80     Depression  Dizziness with short acting Wellbutrin  Totally went away with long-acting Wellbutrin     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  Which includes his kyphoscoliosis and his speech disorder and his intellectual disability and his GI disorder      BPH  According to notes patient had a laser therapy but nothing more is really said  PSA is less than one     Seborrheic dermatitis  Alternate bottles of Selsun Blue and head and shoulders, one one is done just by the other brand so his scalp does not get used to one and not work anymore     Hyperlipidemia  On simvastatin  Cholesterol levels are excellent at 150 with a LDL of 78     Tachycardia  We assume this is why patient is on a beta-blocker as he has no other recent      Neuroendocrine tumor of the pancreas  Apparently this was resected at least 5 years ago and no mention of it or follow-up was ever made again.     Allergic rhinitis  When appropriate patient takes Claritin and Patanol as needed      recheck in 6 months for AWV     Recheck as needed otherwise         Depression Screening and  Follow-up Plan: Patient was screened for depression during today's encounter. They screened negative with a PHQ-9 score of 1.           Health Maintenance   Topic Date Due    Zoster Vaccine (1 of 2) Never done    COVID-19 Vaccine (2 - 2023-24 season) 09/01/2023    Colorectal Cancer Screening  12/13/2024 (Originally 11/10/2006)    HIV Screening  02/03/2025 (Originally 11/10/1975)    Medicare Annual Wellness Visit (AWV)  08/10/2024    Depression Screening  12/13/2024    Hepatitis C Screening  Completed    Influenza Vaccine  Completed    Pneumococcal Vaccine: Pediatrics (0 to 5 Years) and At-Risk Patients (6 to 64 Years)  Aged Out    HIB Vaccine  Aged Out    IPV Vaccine  Aged Out    Hepatitis A Vaccine  Aged Out    Meningococcal ACWY Vaccine  Aged Out    HPV Vaccine  Aged Out         Problem List as of 3/12/2024 Reviewed: 10/10/2023  8:40 AM by Bruna Weller MD      Abnormal gait    Allergic rhinitis    Anxiety    BPH (benign prostatic hyperplasia)    Depression    Last Assessment & Plan 6/23/2023 Hospital Encounter Written 6/27/2023  1:45 PM by Carrie Anne PA-C     Home regimen: Wellbutrin 100 Mg twice daily  Continue home medication         Dysphagia    Last Assessment & Plan 6/23/2023 Hospital Encounter Written 6/27/2023  1:46 PM by Carrie Anne PA-C     Sister reports progressive worsening of dysphagia with upcoming speech therapy appointment  Episode of choking on 6/18  Speech and swallow evaluation done.  Patient underwent video barium swallow and was started on dysphagia 3 with thin liquid         GERD (gastroesophageal reflux disease)    Last Assessment & Plan 6/23/2023 Hospital Encounter Written 6/27/2023  1:46 PM by Carrie Anne PA-C     Home regimen: Omeprazole 40 Mg daily and Carafate 1 g twice daily  Continue home medication         Hypoxia    Inhalation of liquid or vomitus, lower respiratory tract    Intellectual disability    Last Assessment & Plan 6/23/2023 Hospital Encounter  Written 6/27/2023  1:46 PM by Carrie Anne PA-C     Currently living with his sister with plans to move into a group home once available         Kevin Syndrome    Kyphoscoliosis and scoliosis    Mixed hyperlipidemia    Neuroendocrine carcinoma (HCC)    Neuroendocrine tumor of pancreas    Other psoriasis    Primary hypertension    Last Assessment & Plan 6/23/2023 Hospital Encounter Written 6/27/2023  1:46 PM by Carrie Anne PA-C     Home regimen: metoprolol 25 Mg daily  Continue Toprol-XL         Recurrent major depressive disorder, in full remission (HCC)    Restless leg    Sensorineural hearing loss    Urinary incontinence         Subjective:   Chief Complaint   Patient presents with    Hypertension     6 months recheck  BW done    Hyperlipidemia     Patient is here with his sister for his recheck  He followed up with GI and was decided together that colonoscopy would be too high risk with his severe kyphoscoliosis  He went to the emergency room with chest pain when it was really probably just an exacerbation of his coughing which he usually has secondary to his limited lung inspiration and expiration from severe kyphoscoliosis      He had blood work done for us today  Has had a cough about for the last 1 week  However after this every time I see him he is trying to cough and is quite theatrical and performances        patient ID: Jhony Bruno is a 63 y.o. male.    Patient's past medical history, surgical history, family history, social history, and Tobacco history reviewed with patient.     MED LIST WAS REVIEWED AND UPDATED    ROS  As per HPI  Rest of 12 point review of systems negative     Objective:      VITALS:  Wt Readings from Last 3 Encounters:   03/12/24 49.9 kg (110 lb 1.6 oz)   01/05/24 48.5 kg (107 lb)   12/20/23 49.4 kg (109 lb)     BP Readings from Last 3 Encounters:   03/12/24 114/82   12/20/23 158/94   12/20/23 128/72     Pulse Readings from Last 3 Encounters:   03/12/24 82    01/05/24 85   12/20/23 83     Body mass index is 25.82 kg/m².    Laboratory Results:   All pertinent labs and studies were reviewed with patient during this office visit with highlights of the results contained in this note in the ASSESSMENT AND PLAN section       Physical Exam    Constitutional  63-year-old very young juvenile man with a gross kyphoscoliosis no acute distress with slurred speech secondary to his mental limitations  Appears healthy, Looks well, Appearance consistent with age    Mental Status  Alert, Oriented, Cooperative , clean, and reasonable    Neck  No neck mass, No thyromegaly, Good carotid upstrokes bilaterally, trachea midline positive click    Respiratory  Close kyphoscoliosis with the scoliosis concave left with a large buffalo hump on the right  Scattered mucus and rales throughout more on the right than the left  Sometimes patient has a difficult time following directions with breathing versus coughing    Cardiac  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

## 2024-06-24 ENCOUNTER — TELEPHONE (OUTPATIENT)
Dept: FAMILY MEDICINE CLINIC | Facility: CLINIC | Age: 64
End: 2024-06-24

## 2024-06-24 NOTE — TELEPHONE ENCOUNTER
Patients brother in law dropped off paperwork for the patient.  Patient was approved for a group home.    I put form on your desk.

## 2024-06-26 ENCOUNTER — TELEPHONE (OUTPATIENT)
Dept: FAMILY MEDICINE CLINIC | Facility: CLINIC | Age: 64
End: 2024-06-26

## 2024-06-26 NOTE — TELEPHONE ENCOUNTER
Patient's sister called she was returning Loraine's call. She said she does not have a fax number but tomorrow morning she will be stopping by to  the paper work.  Please print it and have it ready on the .  Thank you!!

## 2024-07-02 ENCOUNTER — TELEPHONE (OUTPATIENT)
Age: 64
End: 2024-07-02

## 2024-07-02 NOTE — TELEPHONE ENCOUNTER
Patients sister calling in stating in to request TB mantoux, Hep B testing with result (if the one in chart is not valid), and possible tetanus vaccination.   Please place orders and call to schedule. Thank you!

## 2024-07-02 NOTE — TELEPHONE ENCOUNTER
Please schedule patient for TB test. He had his last tetanus shot in 2017 and will be due in 2027. Also clarify what hepatitis B test needed, to check immunity?

## 2024-07-02 NOTE — TELEPHONE ENCOUNTER
Patient's last hep B surface antibody test 2/15/2023 showed no immunty. Did he get any hep B boosters since then?

## 2024-07-03 NOTE — TELEPHONE ENCOUNTER
Since he did not get any boosters, his Hep B antibody test will still show no immunity. Do they still want the test?

## 2024-07-03 NOTE — TELEPHONE ENCOUNTER
Please clarify with patient's sister which test is needed: hepatitis B surface antibody to check for immunity or hepatitis B surface antigen to check if he is infected.

## 2024-07-08 ENCOUNTER — CLINICAL SUPPORT (OUTPATIENT)
Dept: FAMILY MEDICINE CLINIC | Facility: CLINIC | Age: 64
End: 2024-07-08
Payer: MEDICARE

## 2024-07-08 DIAGNOSIS — Z11.1 SCREENING FOR TUBERCULOSIS: Primary | ICD-10-CM

## 2024-07-08 PROCEDURE — 86580 TB INTRADERMAL TEST: CPT

## 2024-07-09 ENCOUNTER — TELEPHONE (OUTPATIENT)
Age: 64
End: 2024-07-09

## 2024-07-09 NOTE — TELEPHONE ENCOUNTER
Pts sister Amanda called pt has an appt 7/10 and is asking if a copy of his vaccine record can be printed and given to them at his appt. Pt sister is also asking if HepB (2/2023) and last tetanus she thinks from 2017, are both on there.  Pt sister stated pt is moving to new [facility] home 7/17/24 and needs his rx transferred to new facility. Sister wasn't sure if these need to be transferred by the pharmacy or if the office can do it, Pt sister would appreciate a call back please [phone].

## 2024-07-10 ENCOUNTER — CLINICAL SUPPORT (OUTPATIENT)
Dept: FAMILY MEDICINE CLINIC | Facility: CLINIC | Age: 64
End: 2024-07-10

## 2024-07-10 DIAGNOSIS — Z11.1 ENCOUNTER FOR PPD SKIN TEST READING: Primary | ICD-10-CM

## 2024-07-10 LAB
INDURATION: NORMAL MM
TB SKIN TEST: NEGATIVE

## 2024-07-12 ENCOUNTER — OFFICE VISIT (OUTPATIENT)
Dept: FAMILY MEDICINE CLINIC | Facility: CLINIC | Age: 64
End: 2024-07-12
Payer: MEDICARE

## 2024-07-12 ENCOUNTER — TELEPHONE (OUTPATIENT)
Age: 64
End: 2024-07-12

## 2024-07-12 VITALS
RESPIRATION RATE: 18 BRPM | BODY MASS INDEX: 21.99 KG/M2 | SYSTOLIC BLOOD PRESSURE: 134 MMHG | DIASTOLIC BLOOD PRESSURE: 80 MMHG | HEIGHT: 60 IN | HEART RATE: 84 BPM | WEIGHT: 112 LBS | OXYGEN SATURATION: 99 % | TEMPERATURE: 97.8 F

## 2024-07-12 DIAGNOSIS — R00.0 TACHYCARDIA: ICD-10-CM

## 2024-07-12 DIAGNOSIS — J41.8 MIXED SIMPLE AND MUCOPURULENT CHRONIC BRONCHITIS (HCC): Primary | ICD-10-CM

## 2024-07-12 DIAGNOSIS — N48.89 PENILE IRRITATION: ICD-10-CM

## 2024-07-12 DIAGNOSIS — J41.8 MIXED SIMPLE AND MUCOPURULENT CHRONIC BRONCHITIS (HCC): ICD-10-CM

## 2024-07-12 DIAGNOSIS — M41.9 KYPHOSCOLIOSIS AND SCOLIOSIS: ICD-10-CM

## 2024-07-12 PROCEDURE — G2211 COMPLEX E/M VISIT ADD ON: HCPCS | Performed by: PHYSICIAN ASSISTANT

## 2024-07-12 PROCEDURE — 99213 OFFICE O/P EST LOW 20 MIN: CPT | Performed by: PHYSICIAN ASSISTANT

## 2024-07-12 RX ORDER — BUDESONIDE 0.5 MG/2ML
0.5 INHALANT ORAL 2 TIMES DAILY
Qty: 120 ML | Refills: 5 | Status: CANCELLED | OUTPATIENT
Start: 2024-07-12 | End: 2025-01-08

## 2024-07-12 RX ORDER — PREDNISONE 10 MG/1
TABLET ORAL
Qty: 20 TABLET | Refills: 0 | Status: SHIPPED | OUTPATIENT
Start: 2024-07-12

## 2024-07-12 RX ORDER — GUAIFENESIN 600 MG/1
1200 TABLET, EXTENDED RELEASE ORAL EVERY 12 HOURS SCHEDULED
Start: 2024-07-12 | End: 2024-07-16 | Stop reason: SDUPTHER

## 2024-07-12 RX ORDER — CLOTRIMAZOLE 1 %
CREAM (GRAM) TOPICAL 2 TIMES DAILY
Qty: 40 G | Refills: 0 | Status: CANCELLED | OUTPATIENT
Start: 2024-07-12

## 2024-07-12 RX ORDER — METOPROLOL SUCCINATE 25 MG/1
25 TABLET, EXTENDED RELEASE ORAL DAILY
Qty: 90 TABLET | Refills: 3 | Status: CANCELLED | OUTPATIENT
Start: 2024-07-12

## 2024-07-12 RX ORDER — CEFUROXIME AXETIL 500 MG/1
500 TABLET ORAL EVERY 12 HOURS SCHEDULED
Qty: 20 TABLET | Refills: 0 | Status: SHIPPED | OUTPATIENT
Start: 2024-07-12 | End: 2024-07-22

## 2024-07-12 NOTE — TELEPHONE ENCOUNTER
Pt sister would like to  a copy of Pts PPD test results. Pt sister states pt is going to live in group home next week and they need those results on paper. Amanda will be in today to  by 9 am. Unsuccessful call to clerical

## 2024-07-12 NOTE — PROGRESS NOTES
"Assessment/Plan:    Chronic bronchitis with acute exaccerbation - start prednisone taper with food in morning, albuterol nebs 3-4 times a day, continue mucinex, start Ceftin bid x 10 days    F/u as needed      Subjective:   Chief Complaint   Patient presents with    Cough     Cough and congestion started about a week ago  Has been using albuterol and budesonide  Started to improve yesterday worse again this morning  Moving into group home next week      Patient ID: Jhony Bruno is a 63 y.o. male.    July 4 started with a head cold, started with albuterol in morning and budesonide twice a day. Also taking mucinex twice daily. Seems to be settling more in his chest, today woke up and sister thought it sounded much worse. Oxygen and temp remain normal at home.     6/2023 developed pneumonia from similar situation        The following portions of the patient's history were reviewed and updated as appropriate: allergies, current medications, past family history, past medical history, past social history, past surgical history, and problem list.    Past Medical History:   Diagnosis Date    Depression     GERD (gastroesophageal reflux disease)     Intellectual disability     Scoliosis, unspecified scoliosis type, unspecified spinal region      Past Surgical History:   Procedure Laterality Date    APPENDECTOMY      CHOLECYSTECTOMY      HERNIA REPAIR      PANCREAS SURGERY N/A     Removal of a mass perhaps 2018    PROSTATE SURGERY N/A     \"Laser prostate surgery \"     Family History   Problem Relation Age of Onset    Heart disease Mother     Parkinsonism Mother     Heart disease Father     Alcohol abuse Neg Hx     Substance Abuse Neg Hx     Mental illness Neg Hx      Social History     Socioeconomic History    Marital status: Single     Spouse name: Not on file    Number of children: 0    Years of education: Not on file    Highest education level: Not on file   Occupational History    Not on file   Tobacco Use    Smoking " status: Never     Passive exposure: Never    Smokeless tobacco: Never   Vaping Use    Vaping status: Never Used   Substance and Sexual Activity    Alcohol use: Not Currently    Drug use: Never    Sexual activity: Not on file   Other Topics Concern    Not on file   Social History Narrative    Not on file     Social Determinants of Health     Financial Resource Strain: Low Risk  (8/10/2023)    Overall Financial Resource Strain (CARDIA)     Difficulty of Paying Living Expenses: Not very hard   Food Insecurity: No Food Insecurity (6/23/2023)    Hunger Vital Sign     Worried About Running Out of Food in the Last Year: Never true     Ran Out of Food in the Last Year: Never true   Transportation Needs: No Transportation Needs (8/10/2023)    PRAPARE - Transportation     Lack of Transportation (Medical): No     Lack of Transportation (Non-Medical): No   Physical Activity: Not on file   Stress: Not on file   Social Connections: Not on file   Intimate Partner Violence: Not on file   Housing Stability: Low Risk  (6/23/2023)    Housing Stability Vital Sign     Unable to Pay for Housing in the Last Year: No     Number of Places Lived in the Last Year: 1     Unstable Housing in the Last Year: No       Current Outpatient Medications:     acetaminophen (TYLENOL) 325 mg tablet, Take 2 tablets (650 mg total) by mouth every 4 (four) hours as needed for moderate pain or fever, Disp: , Rfl:     albuterol (2.5 mg/3 mL) 0.083 % nebulizer solution, Take 3 mL (2.5 mg total) by nebulization 2 (two) times a day (Patient taking differently: Take 2.5 mg by nebulization 2 (two) times a day as needed for wheezing or shortness of breath), Disp: 75 mL, Rfl: 5    budesonide (Pulmicort) 0.5 mg/2 mL nebulizer solution, Take 2 mL (0.5 mg total) by nebulization 2 (two) times a day Rinse mouth after use. (Patient taking differently: Take 0.5 mg by nebulization 2 (two) times a day as needed Rinse mouth after use.), Disp: 120 mL, Rfl: 5    buPROPion  "(WELLBUTRIN XL) 150 mg 24 hr tablet, Take 1 tablet (150 mg total) by mouth every morning, Disp: 90 tablet, Rfl: 2    calcium carbonate (TUMS ULTRA) 1000 MG chewable tablet, Chew 1 tablet (1,000 mg total) 2 (two) times a day (Patient taking differently: Chew 1,000 mg 2 (two) times a day as needed PRN), Disp: , Rfl: 0    clotrimazole (LOTRIMIN) 1 % cream, Apply topically 2 (two) times a day, Disp: 40 g, Rfl: 0    dextromethorphan-guaifenesin (MUCINEX DM)  MG per 12 hr tablet, Take 1 tablet by mouth as needed for cough, Disp: , Rfl:     loratadine (CLARITIN) 10 mg tablet, Take 1 tablet (10 mg total) by mouth daily as needed for allergies, Disp: , Rfl:     metoprolol succinate (TOPROL-XL) 25 mg 24 hr tablet, Take 1 tablet (25 mg total) by mouth daily, Disp: 90 tablet, Rfl: 3    mometasone (ELOCON) 0.1 % cream, Apply topically daily (Patient taking differently: Apply topically daily as needed), Disp: 45 g, Rfl: 0    omeprazole (PriLOSEC) 40 MG capsule, Take 1 capsule (40 mg total) by mouth daily, Disp: 90 capsule, Rfl: 3    polyethylene glycol (MIRALAX) 17 g packet, Take 17 g by mouth daily, Disp: , Rfl:     simvastatin (ZOCOR) 40 mg tablet, Take 1 tablet (40 mg total) by mouth daily at bedtime, Disp: 90 tablet, Rfl: 3    olopatadine (PATANOL) 0.1 % ophthalmic solution, Administer 1 drop to both eyes 2 (two) times a day as needed for allergies (Patient not taking: Reported on 1/5/2024), Disp: , Rfl:     Review of Systems          Objective:    Vitals:    07/12/24 1424   BP: 134/80   Pulse: 84   Resp: 18   Temp: 97.8 °F (36.6 °C)   TempSrc: Temporal   SpO2: 99%   Weight: 50.8 kg (112 lb)   Height: 4' 6.75\" (1.391 m)        Physical Exam  Constitutional:       Appearance: Normal appearance.   HENT:      Head: Normocephalic and atraumatic.   Cardiovascular:      Rate and Rhythm: Normal rate.      Pulses: Normal pulses.      Heart sounds: Normal heart sounds.   Pulmonary:      Effort: No respiratory distress.      " Breath sounds: Wheezing and rhonchi present. No rales.   Musculoskeletal:      Cervical back: Normal range of motion and neck supple.   Lymphadenopathy:      Cervical: No cervical adenopathy.   Neurological:      General: No focal deficit present.      Mental Status: He is alert and oriented to person, place, and time.   Psychiatric:         Mood and Affect: Mood normal.         Behavior: Behavior normal.         Thought Content: Thought content normal.         Judgment: Judgment normal.

## 2024-07-16 DIAGNOSIS — K21.9 GASTROESOPHAGEAL REFLUX DISEASE, UNSPECIFIED WHETHER ESOPHAGITIS PRESENT: ICD-10-CM

## 2024-07-16 DIAGNOSIS — E78.2 MIXED HYPERLIPIDEMIA: ICD-10-CM

## 2024-07-16 DIAGNOSIS — K59.00 CONSTIPATION, UNSPECIFIED CONSTIPATION TYPE: Primary | ICD-10-CM

## 2024-07-16 DIAGNOSIS — M41.9 KYPHOSCOLIOSIS AND SCOLIOSIS: ICD-10-CM

## 2024-07-16 DIAGNOSIS — N48.89 PENILE IRRITATION: ICD-10-CM

## 2024-07-16 DIAGNOSIS — J41.8 MIXED SIMPLE AND MUCOPURULENT CHRONIC BRONCHITIS (HCC): ICD-10-CM

## 2024-07-16 DIAGNOSIS — R00.0 TACHYCARDIA: ICD-10-CM

## 2024-07-16 DIAGNOSIS — J30.1 SEASONAL ALLERGIC RHINITIS DUE TO POLLEN: ICD-10-CM

## 2024-07-16 DIAGNOSIS — J18.9 PNEUMONIA: ICD-10-CM

## 2024-07-16 RX ORDER — CLOTRIMAZOLE 1 %
CREAM (GRAM) TOPICAL 2 TIMES DAILY PRN
Qty: 40 G | Refills: 3 | Status: SHIPPED | OUTPATIENT
Start: 2024-07-16

## 2024-07-16 RX ORDER — SIMVASTATIN 40 MG
40 TABLET ORAL
Qty: 90 TABLET | Refills: 3 | Status: SHIPPED | OUTPATIENT
Start: 2024-07-16

## 2024-07-16 RX ORDER — POLYETHYLENE GLYCOL 3350 17 G/17G
17 POWDER, FOR SOLUTION ORAL DAILY
Qty: 100 EACH | Refills: 3 | Status: SHIPPED | OUTPATIENT
Start: 2024-07-16

## 2024-07-16 RX ORDER — BUDESONIDE 0.5 MG/2ML
0.5 INHALANT ORAL 2 TIMES DAILY
Qty: 360 ML | Refills: 1 | Status: SHIPPED | OUTPATIENT
Start: 2024-07-16 | End: 2025-01-12

## 2024-07-16 RX ORDER — OMEPRAZOLE 40 MG/1
40 CAPSULE, DELAYED RELEASE ORAL DAILY
Qty: 90 CAPSULE | Refills: 3 | Status: SHIPPED | OUTPATIENT
Start: 2024-07-16

## 2024-07-16 RX ORDER — ACETAMINOPHEN 325 MG/1
650 TABLET ORAL EVERY 4 HOURS PRN
Qty: 100 TABLET | Refills: 3 | Status: SHIPPED | OUTPATIENT
Start: 2024-07-16

## 2024-07-16 RX ORDER — METOPROLOL SUCCINATE 25 MG/1
25 TABLET, EXTENDED RELEASE ORAL
Qty: 90 TABLET | Refills: 3 | Status: SHIPPED | OUTPATIENT
Start: 2024-07-16

## 2024-07-16 RX ORDER — LORATADINE 10 MG/1
10 TABLET ORAL DAILY PRN
Qty: 90 TABLET | Refills: 3 | Status: SHIPPED | OUTPATIENT
Start: 2024-07-16

## 2024-07-16 RX ORDER — ALBUTEROL SULFATE 2.5 MG/3ML
2.5 SOLUTION RESPIRATORY (INHALATION) 2 TIMES DAILY PRN
Qty: 180 ML | Refills: 1 | Status: SHIPPED | OUTPATIENT
Start: 2024-07-16

## 2024-07-16 RX ORDER — GUAIFENESIN 600 MG/1
1200 TABLET, EXTENDED RELEASE ORAL EVERY 12 HOURS SCHEDULED
Qty: 180 TABLET | Refills: 3 | Status: SHIPPED | OUTPATIENT
Start: 2024-07-16

## 2024-07-16 RX ORDER — OLOPATADINE HYDROCHLORIDE 1 MG/ML
1 SOLUTION/ DROPS OPHTHALMIC 2 TIMES DAILY PRN
Qty: 5 ML | Refills: 5 | Status: SHIPPED | OUTPATIENT
Start: 2024-07-16

## 2024-07-16 NOTE — TELEPHONE ENCOUNTER
Patient needs scripts printed so Amanda his sister can  they needs these all printed so give to pharmacy.      Budesonide for wheezing -take off 2xs per day.PRN with determination  Clotrimazole- needs prn when he has breaks out in rash  Olopatadine- needs prn as needs when itchy/or red    Needs this to get brother in group home today.      They need all his medications printed to take them to pharmacy

## 2024-07-17 NOTE — TELEPHONE ENCOUNTER
Spoke to Amanda patients sister and she asked for us to fax scripts after Dr. Christopher signs them.    Fax to: [phone]    Scripts on Dr. Christopher's desk

## 2024-07-17 NOTE — TELEPHONE ENCOUNTER
Amanda called back to see the status if the medication list is done. Would like a call back with status update at [phone].     Jhony is already moved in his group home and they are not giving him any medication without the prescription.

## 2024-08-05 ENCOUNTER — OFFICE VISIT (OUTPATIENT)
Dept: FAMILY MEDICINE CLINIC | Facility: CLINIC | Age: 64
End: 2024-08-05
Payer: MEDICARE

## 2024-08-05 VITALS
HEART RATE: 70 BPM | OXYGEN SATURATION: 98 % | SYSTOLIC BLOOD PRESSURE: 120 MMHG | BODY MASS INDEX: 21.34 KG/M2 | TEMPERATURE: 98.2 F | HEIGHT: 60 IN | DIASTOLIC BLOOD PRESSURE: 78 MMHG | WEIGHT: 108.7 LBS | RESPIRATION RATE: 15 BRPM

## 2024-08-05 DIAGNOSIS — G25.81 RESTLESS LEG: ICD-10-CM

## 2024-08-05 DIAGNOSIS — J18.9 PNEUMONIA: ICD-10-CM

## 2024-08-05 DIAGNOSIS — H90.3 SENSORINEURAL HEARING LOSS (SNHL) OF BOTH EARS: ICD-10-CM

## 2024-08-05 DIAGNOSIS — F33.42 RECURRENT MAJOR DEPRESSIVE DISORDER, IN FULL REMISSION (HCC): ICD-10-CM

## 2024-08-05 DIAGNOSIS — Q93.89 JACOBSEN SYNDROME: ICD-10-CM

## 2024-08-05 DIAGNOSIS — F32.9 REACTIVE DEPRESSION: ICD-10-CM

## 2024-08-05 DIAGNOSIS — F41.9 ANXIETY: Primary | ICD-10-CM

## 2024-08-05 DIAGNOSIS — D3A.8 NEUROENDOCRINE TUMOR OF PANCREAS: ICD-10-CM

## 2024-08-05 DIAGNOSIS — M41.9 KYPHOSCOLIOSIS AND SCOLIOSIS: ICD-10-CM

## 2024-08-05 DIAGNOSIS — R32 URINARY INCONTINENCE, UNSPECIFIED TYPE: ICD-10-CM

## 2024-08-05 DIAGNOSIS — I10 PRIMARY HYPERTENSION: ICD-10-CM

## 2024-08-05 DIAGNOSIS — E78.2 MIXED HYPERLIPIDEMIA: ICD-10-CM

## 2024-08-05 DIAGNOSIS — F79 INTELLECTUAL DISABILITY: ICD-10-CM

## 2024-08-05 DIAGNOSIS — R06.89 ACUTE RESPIRATORY INSUFFICIENCY: ICD-10-CM

## 2024-08-05 DIAGNOSIS — N40.0 BENIGN PROSTATIC HYPERPLASIA, UNSPECIFIED WHETHER LOWER URINARY TRACT SYMPTOMS PRESENT: ICD-10-CM

## 2024-08-05 DIAGNOSIS — J41.8 MIXED SIMPLE AND MUCOPURULENT CHRONIC BRONCHITIS (HCC): ICD-10-CM

## 2024-08-05 DIAGNOSIS — C7A.8 NEUROENDOCRINE CARCINOMA (HCC): ICD-10-CM

## 2024-08-05 PROBLEM — R13.10 DYSPHAGIA: Status: RESOLVED | Noted: 2023-06-23 | Resolved: 2024-08-05

## 2024-08-05 PROCEDURE — G2211 COMPLEX E/M VISIT ADD ON: HCPCS | Performed by: FAMILY MEDICINE

## 2024-08-05 PROCEDURE — 99214 OFFICE O/P EST MOD 30 MIN: CPT | Performed by: FAMILY MEDICINE

## 2024-08-05 RX ORDER — BUDESONIDE 0.5 MG/2ML
0.5 INHALANT ORAL 2 TIMES DAILY PRN
Qty: 360 ML | Refills: 1 | Status: SHIPPED | OUTPATIENT
Start: 2024-08-05 | End: 2025-02-01

## 2024-08-05 RX ORDER — ALBUTEROL SULFATE 2.5 MG/3ML
2.5 SOLUTION RESPIRATORY (INHALATION) 2 TIMES DAILY PRN
Qty: 180 ML | Refills: 1 | Status: SHIPPED | OUTPATIENT
Start: 2024-08-05

## 2024-08-05 RX ORDER — IPRATROPIUM BROMIDE AND ALBUTEROL SULFATE 2.5; .5 MG/3ML; MG/3ML
3 SOLUTION RESPIRATORY (INHALATION) 2 TIMES DAILY PRN
Qty: 360 ML | Refills: 3 | Status: SHIPPED | OUTPATIENT
Start: 2024-08-05

## 2024-08-05 NOTE — PATIENT INSTRUCTIONS
see you back in September    This will give us time to see but no nebulizer is due for him  If there are big problems prior let me know and I will make a decision

## 2024-08-22 ENCOUNTER — OFFICE VISIT (OUTPATIENT)
Dept: DERMATOLOGY | Facility: CLINIC | Age: 64
End: 2024-08-22
Payer: MEDICARE

## 2024-08-22 VITALS — TEMPERATURE: 97.9 F

## 2024-08-22 DIAGNOSIS — L21.9 SEBORRHEIC DERMATITIS: ICD-10-CM

## 2024-08-22 DIAGNOSIS — D18.01 CHERRY ANGIOMA: ICD-10-CM

## 2024-08-22 DIAGNOSIS — B35.3 TINEA PEDIS OF BOTH FEET: ICD-10-CM

## 2024-08-22 DIAGNOSIS — L82.1 SEBORRHEIC KERATOSES: Primary | ICD-10-CM

## 2024-08-22 DIAGNOSIS — D22.9 MULTIPLE MELANOCYTIC NEVI: ICD-10-CM

## 2024-08-22 DIAGNOSIS — L81.4 LENTIGO: ICD-10-CM

## 2024-08-22 PROCEDURE — 99204 OFFICE O/P NEW MOD 45 MIN: CPT | Performed by: DERMATOLOGY

## 2024-08-22 RX ORDER — KETOCONAZOLE 20 MG/G
CREAM TOPICAL
Qty: 30 G | Refills: 10 | Status: SHIPPED | OUTPATIENT
Start: 2024-08-22

## 2024-08-22 RX ORDER — HYDROCORTISONE 2.5 %
CREAM (GRAM) TOPICAL 2 TIMES DAILY
Status: CANCELLED | OUTPATIENT
Start: 2024-08-22

## 2024-08-22 RX ORDER — CLOBETASOL PROPIONATE 0.5 MG/ML
SOLUTION TOPICAL
Qty: 50 ML | Refills: 4 | Status: SHIPPED | OUTPATIENT
Start: 2024-08-22

## 2024-08-22 RX ORDER — CLOBETASOL PROPIONATE 0.5 MG/ML
SOLUTION TOPICAL 2 TIMES DAILY
Qty: 50 ML | Refills: 2 | Status: CANCELLED | OUTPATIENT
Start: 2024-08-22

## 2024-08-22 RX ORDER — HYDROCORTISONE 25 MG/G
OINTMENT TOPICAL 2 TIMES DAILY
Qty: 30 G | Refills: 1 | Status: SHIPPED | OUTPATIENT
Start: 2024-08-22

## 2024-08-22 RX ORDER — KETOCONAZOLE 20 MG/G
CREAM TOPICAL DAILY
Qty: 60 G | Refills: 3 | Status: CANCELLED | OUTPATIENT
Start: 2024-08-22

## 2024-08-22 RX ORDER — KETOCONAZOLE 20 MG/ML
SHAMPOO TOPICAL ONCE
Qty: 120 ML | Refills: 3 | Status: CANCELLED | OUTPATIENT
Start: 2024-08-22 | End: 2024-08-22

## 2024-08-22 RX ORDER — PRENATAL VIT 91/IRON/FOLIC/DHA 28-975-200
COMBINATION PACKAGE (EA) ORAL 2 TIMES DAILY
Qty: 42 G | Refills: 3 | Status: SHIPPED | OUTPATIENT
Start: 2024-08-22

## 2024-08-22 RX ORDER — KETOCONAZOLE 20 MG/ML
SHAMPOO TOPICAL
Qty: 100 ML | Refills: 10 | Status: SHIPPED | OUTPATIENT
Start: 2024-08-22

## 2024-08-22 NOTE — PROGRESS NOTES
"[facility] Dermatology Clinic Note     Patient Name: Jhony Bruno  Encounter Date: 8/22/24     Have you been cared for by a [facility] Dermatologist in the last 3 years and, if so, which description applies to you?    NO.   I am considered a \"new\" patient and must complete all patient intake questions. I am MALE/not capable of bearing children.    REVIEW OF SYSTEMS:  Have you recently had or currently have any of the following? Recent fever or chills? No  Any non-healing wound? No   PAST MEDICAL HISTORY:  Have you personally ever had or currently have any of the following?  If \"YES,\" then please provide more detail. Skin cancer (such as Melanoma, Basal Cell Carcinoma, Squamous Cell Carcinoma?  No  Tuberculosis, HIV/AIDS, Hepatitis B or C: No  Radiation Treatment No   HISTORY OF IMMUNOSUPPRESSION:   Do you have a history of any of the following:  Systemic Immunosuppression such as Diabetes, Biologic or Immunotherapy, Chemotherapy, Organ Transplantation, Bone Marrow Transplantation?  No     Answering \"YES\" requires the addition of the dotphrase \"IMMUNOSUPPRESSED\" as the first diagnosis of the patient's visit.   FAMILY HISTORY:  Any \"first degree relatives\" (parent, brother, sister, or child) with the following?    Skin Cancer, Pancreatic or Other Cancer? No   PATIENT EXPERIENCE:    Do you want the Dermatologist to perform a COMPLETE skin exam today including a clinical examination under the \"bra and underwear\" areas?  Yes  If necessary, do we have your permission to call and leave a detailed message on your Preferred Phone number that includes your specific medical information?  Yes      Allergies   Allergen Reactions    Levofloxacin Itching    Codeine Nausea Only     Sister is unsure of actual reaction.    Augmentin [Amoxicillin-Pot Clavulanate] Diarrhea      Current Outpatient Medications:     acetaminophen (TYLENOL) 325 mg tablet, Take 2 tablets (650 mg total) by mouth every 4 (four) hours as needed for moderate " pain or fever, Disp: 100 tablet, Rfl: 3    albuterol (2.5 mg/3 mL) 0.083 % nebulizer solution, Take 3 mL (2.5 mg total) by nebulization 2 (two) times a day as needed for wheezing or shortness of breath (And cough), Disp: 180 mL, Rfl: 1    budesonide (Pulmicort) 0.5 mg/2 mL nebulizer solution, Take 2 mL (0.5 mg total) by nebulization 2 (two) times a day as needed (Shortness of breath, wheeze or cough) Rinse mouth after use., Disp: 360 mL, Rfl: 1    buPROPion (WELLBUTRIN XL) 150 mg 24 hr tablet, Take 1 tablet (150 mg total) by mouth every morning, Disp: 90 tablet, Rfl: 2    calcium carbonate (TUMS ULTRA) 1000 MG chewable tablet, Chew 1 tablet (1,000 mg total) 2 (two) times a day, Disp: 180 tablet, Rfl: 3    clotrimazole (LOTRIMIN) 1 % cream, Apply topically 2 (two) times a day as needed (Itchy rash), Disp: 40 g, Rfl: 3    dextromethorphan-guaifenesin (MUCINEX DM)  MG per 12 hr tablet, Take 1 tablet by mouth as needed for cough, Disp: , Rfl:     guaiFENesin (MUCINEX) 600 mg 12 hr tablet, Take 2 tablets (1,200 mg total) by mouth every 12 (twelve) hours, Disp: 180 tablet, Rfl: 3    ipratropium-albuterol (DUO-NEB) 0.5-2.5 mg/3 mL nebulizer solution, Take 3 mL by nebulization 2 (two) times a day as needed for wheezing or shortness of breath (cough), Disp: 360 mL, Rfl: 3    loratadine (CLARITIN) 10 mg tablet, Take 1 tablet (10 mg total) by mouth daily as needed for allergies, Disp: 90 tablet, Rfl: 3    metoprolol succinate (TOPROL-XL) 25 mg 24 hr tablet, Take 1 tablet (25 mg total) by mouth daily at bedtime, Disp: 90 tablet, Rfl: 3    mometasone (ELOCON) 0.1 % cream, Apply topically daily (Patient taking differently: Apply topically daily as needed), Disp: 45 g, Rfl: 0    olopatadine (PATANOL) 0.1 % ophthalmic solution, Administer 1 drop to both eyes 2 (two) times a day as needed for allergies, Disp: 5 mL, Rfl: 5    omeprazole (PriLOSEC) 40 MG capsule, Take 1 capsule (40 mg total) by mouth daily, Disp: 90 capsule,  "Rfl: 3    polyethylene glycol (MIRALAX) 17 g packet, Take 17 g by mouth daily, Disp: 100 each, Rfl: 3    simvastatin (ZOCOR) 40 mg tablet, Take 1 tablet (40 mg total) by mouth daily at bedtime, Disp: 90 tablet, Rfl: 3          Whom besides the patient is providing clinical information about today's encounter?   Caretaker provided history (patient is institutionalized)    Physical Exam and Assessment/Plan by Diagnosis:    TINEA PEDIS (\"ATHLETE'S FOOT\")  TINEA UNGUIUM     Physical Exam:  Anatomic Location Affected:  Feet and between toes and toenails  Morphological Description:  Red scaling with interdigital maceration and diffuse scaling on plantar feet  Pertinent Positives: Itching. DANDRE POSITIVE  Pertinent Negatives:    Additional History of Present Condition:     Duration: years   Treatments attempted: none    Assessment and Plan:  - History and physical most consistent with tinea pedis with tinea unguium   - Discussed that this is a common fungal infection that can present with itching, redness and scaling.   - Based on a thorough discussion of this condition and the management approach to it (including a comprehensive discussion of the known risks, side effects and potential benefits of treatment), the patient (family) agrees to implement the following specific plan:  Start terbinafine cream twice daily for 3 weeks all over feet and between toes. Then use cream 1-2 times a week to prevent re-infection. Prescription sent today.  Make sure feet are clean and dry with protective footwear worn in communal facilities.  Wash footwear that is worn without socks so the feet don't get reinfected   If this is not effective after 3-4 weeks, could consider PO antifungal        SEBORRHEIC DERMATITIS     Physical Exam:  Anatomic Location Affected &  Morphological Description:  Greasy adherent scale/scaling plaques on the scalp, NLFs, perioral skin, and b/l eyebrows.  Pertinent Positives: worsened with stress  Pertinent " Negatives:    Additional History of Present Condition:  Pt notes increased scaling on face and scalp for a while. Has tried OTC shampoo, ketoconazole shampoo, and mometasone.    Assessment and Plan:  Based on a thorough discussion of this condition and the management approach to it (including a comprehensive discussion of the known risks, side effects and potential benefits of treatment), the patient (family) agrees to implement the following specific plan:           SCALP  Start ketoconazole shampoo 2-3 times per week, lather for 5 minutes prior to rinsing off. For facial involvement, ketoconazole cream can be used daily for maintenance therapy, and should be used a few times a week for suppression when skin is under good control.  Apply the Clobetasol steroid solution 1-2 times daily to dry scalp and leave it on. Use as needed for redness, scale and itching. After 2 weeks of use, stop and reassess, can restart if needed.           FACE  For flares only, apply the steroid cream (hydrocortisone) twice a day for up to 5-7 days at a time as needed.  Discussed AE of skin thinning with chronic use. Use this only when you have flares of your rash.  Then, once the rash subsides, use ketoconazole cream daily as a maintenance. This is NOT a steroid, is safe for long-term everyday use. If you use this daily this will keep the rash on your face under control and help prevent flares of the flaking.      The chronic nature of this condition and association with normal skin yeast were reviewed. The goal of therapy is to control symptoms, but this is not typically something we cure and intermittent flares can be expected.    If severity merited by exam findings, topical steroids can be used for no longer than 1 week on face and 2 weeks on scalp (due to risks of atrophy, acne, etc) for significant flares for acute control, but azole therapy as above is the long term maintenance therapy of choice.      SEBORRHEIC KERATOSES  -  Relevant exam: Scattered over the trunk/extremities are waxy brown to black plaques and papules with stuck on appearance and consistent dermoscopy  - Exam and clinical history consistent with seborrheic keratoses  - Counseled that these are benign growths that do not require treatment    MELANOCYTIC NEVI  -Relevant exam: Scattered over the trunk/extremities are homogenously pigmented brown macules and papules. ELM performed and without concerning findings. No outliers unless otherwise noted in today's note  - Exam and clinical history consistent with melanocytic nevi  - Counseled to return to clinic prior to scheduled appointment should any of these lesions change or should any new lesions of concern arise  - Counseled on use of sun protection daily. Reviewed latest FDA sunscreen guidelines, including use of broad spectrum (UVA and UVB blocking) sunscreen or sun protective clothing with SPF 30-50 every 2-3 hours and reapplied after exposure to water    LENTIGINES  OTHER SKIN CHANGES DUE TO CHRONIC EXPOSURE TO NONIONIZING RADIATION  - Relevant exam: Over sun exposed areas are brown macules. ELM performed and without concerning findings.  - Exam and clinical history consistent with lentigines.  - Counseled to return to clinic prior to scheduled appointment should any of these lesions change or should any new lesions of concern arise.  - Recommended use of sunscreen as above and below.    CHERRY ANGIOMAS  - Relevant exam: Scattered over the trunk/extremities are red papules  - Exam and clinical history consistent with cherry angiomas  - Educated that these are benign    No lesions concerning for skin cancer on FBSE.      Scribe Attestation      I,:  Dayo Garcia MA am acting as a scribe while in the presence of the attending physician.:       I,:  Daya Guillen MD personally performed the services described in this documentation    as scribed in my presence.:

## 2024-08-22 NOTE — PATIENT INSTRUCTIONS
"TINEA PEDIS (\"ATHLETE'S FOOT\")       Assessment and Plan:  - History and physical most consistent with tinea pedis with tinea unguium   - Discussed that this is a common fungal infection that can present with itching, redness and rarely scaling. Educated that toenail involvement is commonly also seen and can present with thickened yellow toenails.   - Educated that diagnosis is often made by exam alone and that treatment entails use of topical or oral antifungals  - Based on a thorough discussion of this condition and the management approach to it (including a comprehensive discussion of the known risks, side effects and potential benefits of treatment), the patient (family) agrees to implement the following specific plan:  Start terbinafine cream twice daily for 3 weeks all over feet and between toes. Then use cream 1-2 times a week to prevent re-infection. Prescription sent today.  May use vinegar soaks to supplement treatment by diluting one cup of white or apple cider vinegar to a gallon of water and soaking for 15 minutes  Make sure feet are clean and dry with protective footwear worn in communal facilities.  Wash footwear that is worn without socks so the feet don't get reinfected            SEBORRHEIC DERMATITIS          Assessment and Plan:  Based on a thorough discussion of this condition and the management approach to it (including a comprehensive discussion of the known risks, side effects and potential benefits of treatment), the patient (family) agrees to implement the following specific plan:            SCALP  Start OTC anti-dandruff shampoo (Head & Shoulders, S[place]un Blue, Neutrogena T-gel or T-Sal) 2x/week to damp scalp, let sit 10 minutes then rinse (may use normal shampoo/conditioner thereafter as desired) and can alternate with ketoconazole shampoo 2-3 times per week, lather for 5 minutes prior to rinsing off. For facial involvement, ketoconazole cream can be used daily for maintenance therapy, and " should be used a few times a week for suppression when skin is under good control.  Apply the Clobetasol steroid solution 1-2 times daily to dry scalp and leave it on. Use as needed for redness, scale and itching. After 2 weeks of use, stop and reassess, can restart if needed.            FACE  For flares only, apply the steroid cream (hydrocortisone) twice a day for up to 5-7 days at a time as needed.  Discussed AE of skin thinning with chronic use. Use this only when you have flares of your rash.  Then, once the rash subsides, use ketoconazole cream daily as a maintenance. This is NOT a steroid, is safe for long-term everyday use. If you use this daily this will keep the rash on your face under control and help prevent flares of the flaking.        The chronic nature of this condition and association with normal skin yeast were reviewed. The goal of therapy is to control symptoms, but this is not typically something we cure and intermittent flares can be expected.     If severity merited by exam findings, topical steroids can be used for no longer than 1 week on face and 2 weeks on scalp (due to risks of atrophy, acne, etc) for significant flares for acute control, but azole therapy as above is the long term maintenance therapy of choice.        SEBORRHEIC KERATOSES  - Relevant exam: Scattered over the trunk/extremities are waxy brown to black plaques and papules with stuck on appearance and consistent dermoscopy  - Exam and clinical history consistent with seborrheic keratoses  - Counseled that these are benign growths that do not require treatment     MELANOCYTIC NEVI  -Relevant exam: Scattered over the trunk/extremities are homogenously pigmented brown macules and papules. ELM performed and without concerning findings. No outliers unless otherwise noted in today's note  - Exam and clinical history consistent with melanocytic nevi  - Counseled to return to clinic prior to scheduled appointment should any of these  lesions change or should any new lesions of concern arise  - Counseled on use of sun protection daily. Reviewed latest FDA sunscreen guidelines, including use of broad spectrum (UVA and UVB blocking) sunscreen or sun protective clothing with SPF 30-50 every 2-3 hours and reapplied after exposure to water     LENTIGINES  OTHER SKIN CHANGES DUE TO CHRONIC EXPOSURE TO NONIONIZING RADIATION  - Relevant exam: Over sun exposed areas are brown macules. ELM performed and without concerning findings.  - Exam and clinical history consistent with lentigines.  - Counseled to return to clinic prior to scheduled appointment should any of these lesions change or should any new lesions of concern arise.  - Recommended use of sunscreen as above and below.     CHERRY ANGIOMAS  - Relevant exam: Scattered over the trunk/extremities are red papules  - Exam and clinical history consistent with cherry angiomas  - Educated that these are benign

## 2024-08-23 ENCOUNTER — TELEPHONE (OUTPATIENT)
Dept: DERMATOLOGY | Facility: CLINIC | Age: 64
End: 2024-08-23

## 2024-08-26 ENCOUNTER — OFFICE VISIT (OUTPATIENT)
Dept: URGENT CARE | Age: 64
End: 2024-08-26
Payer: MEDICARE

## 2024-08-26 VITALS
RESPIRATION RATE: 18 BRPM | DIASTOLIC BLOOD PRESSURE: 80 MMHG | HEART RATE: 100 BPM | TEMPERATURE: 98 F | SYSTOLIC BLOOD PRESSURE: 133 MMHG | OXYGEN SATURATION: 99 %

## 2024-08-26 DIAGNOSIS — N30.01 ACUTE CYSTITIS WITH HEMATURIA: Primary | ICD-10-CM

## 2024-08-26 DIAGNOSIS — R39.9 UTI SYMPTOMS: ICD-10-CM

## 2024-08-26 LAB
SL AMB  POCT GLUCOSE, UA: ABNORMAL
SL AMB LEUKOCYTE ESTERASE,UA: ABNORMAL
SL AMB POCT BILIRUBIN,UA: ABNORMAL
SL AMB POCT BLOOD,UA: ABNORMAL
SL AMB POCT CLARITY,UA: ABNORMAL
SL AMB POCT COLOR,UA: YELLOW
SL AMB POCT KETONES,UA: ABNORMAL
SL AMB POCT NITRITE,UA: ABNORMAL
SL AMB POCT PH,UA: 6.5
SL AMB POCT SPECIFIC GRAVITY,UA: 1.03
SL AMB POCT URINE PROTEIN: 300
SL AMB POCT UROBILINOGEN: ABNORMAL

## 2024-08-26 PROCEDURE — 81002 URINALYSIS NONAUTO W/O SCOPE: CPT | Performed by: STUDENT IN AN ORGANIZED HEALTH CARE EDUCATION/TRAINING PROGRAM

## 2024-08-26 PROCEDURE — 99213 OFFICE O/P EST LOW 20 MIN: CPT | Performed by: STUDENT IN AN ORGANIZED HEALTH CARE EDUCATION/TRAINING PROGRAM

## 2024-08-26 PROCEDURE — G0463 HOSPITAL OUTPT CLINIC VISIT: HCPCS | Performed by: STUDENT IN AN ORGANIZED HEALTH CARE EDUCATION/TRAINING PROGRAM

## 2024-08-26 PROCEDURE — 87086 URINE CULTURE/COLONY COUNT: CPT | Performed by: STUDENT IN AN ORGANIZED HEALTH CARE EDUCATION/TRAINING PROGRAM

## 2024-08-26 RX ORDER — CEPHALEXIN 500 MG/1
500 CAPSULE ORAL EVERY 12 HOURS SCHEDULED
Qty: 14 CAPSULE | Refills: 0 | Status: SHIPPED | OUTPATIENT
Start: 2024-08-26 | End: 2024-09-02

## 2024-08-26 NOTE — PATIENT INSTRUCTIONS
Urine analysis was positive for blood and leukocytes.  This is indicative of a urinary tract infection.  We will treat this infection with oral Keflex.    I would like patient to follow-up with urology for evaluation of gross hematuria (bright red blood in the urine).  I have put a referral in the system.  Please reach out to [facility] urology to make an appointment.    If tests have been performed at [facility], our office will contact you with results if changes need to be made to the care plan discussed with you at the visit.  [facility]    Follow up with PCP.     If any of the following occur, please report to your nearest ED for evaluation or call 911.   Difficultly breathing or shortness of breath  Chest pain  Acutely worsening symptoms.

## 2024-08-26 NOTE — PROGRESS NOTES
[facility]        NAME: Jhony Bruno is a 63 y.o. male  : 1960    MRN: [id number]  DATE: 2024  TIME: 2:40 PM    Assessment and Orders   Acute cystitis with hematuria [N30.01]  1. Acute cystitis with hematuria  cephalexin (KEFLEX) 500 mg capsule    Ambulatory Referral to Urology      2. UTI symptoms  POCT urine dip    Urine culture            Plan and Discussion      Symptoms and exam consistent with acute cystitis with hematuria.  UA positive for leukocytes, blood and protein. Will treat with oral Keflex. Given patient's report of bright red blood in the urine, will refer to urology for further evaluation.      Patient unable to provide enough urine for culture. Will try to get urine sample at home and bring to clinic. Cup and bag provided.       Risks and benefits discussed. Patient understands and agrees with the plan.     PATIENT INSTRUCTIONS    Urine analysis was positive for blood and leukocytes.  This is indicative of a urinary tract infection.  We will treat this infection with oral Keflex.    I would like patient to follow-up with urology for evaluation of gross hematuria (bright red blood in the urine).  I have put a referral in the system.  Please reach out to [facility] urolog to make an appointment.    If tests have been performed at [facility], our office will contact you with results if changes need to be made to the care plan discussed with you at the visit.  [facility]    Follow up with PCP.     If any of the following occur, please report to your nearest ED for evaluation or call 911.   Difficultly breathing or shortness of breath  Chest pain  Acutely worsening symptoms.         Chief Complaint     Chief Complaint   Patient presents with    Possible UTI     Hematuria present since Saturday. No burning, frequency or urgency.          History of Present Illness       Is a 63-year-old male with significant intellectual  disability who presents with blood in the urine since Saturday.  States that it was only a little bit of blood on Saturday and a little bit of blood this morning.  He denies abdominal and back pain.  Care worker who was with him states that he was acting normally all weekend however did have some  instances of urinary urgency.  No group home staff member actually saw blood in the urine and this was not reported until this morning.  Per the care taker, he has a history of UTIs.  Per chart review, patient has history of BPH.  Also per the caretaker, the sister wanted me to know that he has a history of exaggerating to get medical attention.        Review of Systems   Review of Systems  As stated above    Current Medications       Current Outpatient Medications:     acetaminophen (TYLENOL) 325 mg tablet, Take 2 tablets (650 mg total) by mouth every 4 (four) hours as needed for moderate pain or fever, Disp: 100 tablet, Rfl: 3    albuterol (2.5 mg/3 mL) 0.083 % nebulizer solution, Take 3 mL (2.5 mg total) by nebulization 2 (two) times a day as needed for wheezing or shortness of breath (And cough), Disp: 180 mL, Rfl: 1    budesonide (Pulmicort) 0.5 mg/2 mL nebulizer solution, Take 2 mL (0.5 mg total) by nebulization 2 (two) times a day as needed (Shortness of breath, wheeze or cough) Rinse mouth after use., Disp: 360 mL, Rfl: 1    buPROPion (WELLBUTRIN XL) 150 mg 24 hr tablet, Take 1 tablet (150 mg total) by mouth every morning, Disp: 90 tablet, Rfl: 2    calcium carbonate (TUMS ULTRA) 1000 MG chewable tablet, Chew 1 tablet (1,000 mg total) 2 (two) times a day, Disp: 180 tablet, Rfl: 3    cephalexin (KEFLEX) 500 mg capsule, Take 1 capsule (500 mg total) by mouth every 12 (twelve) hours for 7 days, Disp: 14 capsule, Rfl: 0    clobetasol (TEMOVATE) 0.05 % external solution, Apply BID for up to 2 weeks to affected skin on scalp prn flares then take one week break and can repeat regimen prn flares. Do not apply to groin,  skin folds, face., Disp: 50 mL, Rfl: 4    clotrimazole (LOTRIMIN) 1 % cream, Apply topically 2 (two) times a day as needed (Itchy rash), Disp: 40 g, Rfl: 3    dextromethorphan-guaifenesin (MUCINEX DM)  MG per 12 hr tablet, Take 1 tablet by mouth as needed for cough, Disp: , Rfl:     guaiFENesin (MUCINEX) 600 mg 12 hr tablet, Take 2 tablets (1,200 mg total) by mouth every 12 (twelve) hours, Disp: 180 tablet, Rfl: 3    hydrocortisone 2.5 % ointment, Apply topically 2 (two) times a day For no longer than 5-7 days to affected skin on the face. Use only for flares, and take 1-2 week break between using., Disp: 30 g, Rfl: 1    ipratropium-albuterol (DUO-NEB) 0.5-2.5 mg/3 mL nebulizer solution, Take 3 mL by nebulization 2 (two) times a day as needed for wheezing or shortness of breath (cough), Disp: 360 mL, Rfl: 3    ketoconazole (NIZORAL) 2 % cream, Apply topically daily to affected skin on the face when flaring, and 3-4 times per week for maintenance when under good control, Disp: 30 g, Rfl: 10    ketoconazole (NIZORAL) 2 % shampoo, Use as shampoo at least 3 times per week to scalp. Lather into scalp and let sit for 5 minutes before rinsing., Disp: 100 mL, Rfl: 10    loratadine (CLARITIN) 10 mg tablet, Take 1 tablet (10 mg total) by mouth daily as needed for allergies, Disp: 90 tablet, Rfl: 3    metoprolol succinate (TOPROL-XL) 25 mg 24 hr tablet, Take 1 tablet (25 mg total) by mouth daily at bedtime, Disp: 90 tablet, Rfl: 3    mometasone (ELOCON) 0.1 % cream, Apply topically daily (Patient taking differently: Apply topically daily as needed), Disp: 45 g, Rfl: 0    olopatadine (PATANOL) 0.1 % ophthalmic solution, Administer 1 drop to both eyes 2 (two) times a day as needed for allergies, Disp: 5 mL, Rfl: 5    omeprazole (PriLOSEC) 40 MG capsule, Take 1 capsule (40 mg total) by mouth daily, Disp: 90 capsule, Rfl: 3    polyethylene glycol (MIRALAX) 17 g packet, Take 17 g by mouth daily, Disp: 100 each, Rfl: 3     "simvastatin (ZOCOR) 40 mg tablet, Take 1 tablet (40 mg total) by mouth daily at bedtime, Disp: 90 tablet, Rfl: 3    terbinafine (LamISIL) 1 % cream, Apply topically 2 (two) times a day On the feet, in between the toes and on the toe nails, Disp: 42 g, Rfl: 3    Current Allergies     Allergies as of 08/26/2024 - Reviewed 08/26/2024   Allergen Reaction Noted    Levofloxacin Itching 10/14/2016    Codeine Nausea Only 11/30/2022    Augmentin [amoxicillin-pot clavulanate] Diarrhea 03/12/2024            The following portions of the patient's history were reviewed and updated as appropriate: allergies, current medications, past family history, past medical history, past social history, past surgical history and problem list.     Past Medical History:   Diagnosis Date    Anxiety 11/15/2016    Last Assessment & Plan:   Formatting of this note might be different from the original.  Agree with attempt to taper. Take zoloft 50mg every other day for a week, every 3rd day the following week. Then, discontinue it.      Depression     Dysphagia 06/23/2023    GERD (gastroesophageal reflux disease)     Intellectual disability     Reactive depression 06/23/2023    Scoliosis, unspecified scoliosis type, unspecified spinal region        Past Surgical History:   Procedure Laterality Date    APPENDECTOMY      CHOLECYSTECTOMY      HERNIA REPAIR      PANCREAS SURGERY N/A     Removal of a mass perhaps 2018    PROSTATE SURGERY N/A     \"Laser prostate surgery \"       Family History   Problem Relation Age of Onset    Heart disease Mother     Parkinsonism Mother     Heart disease Father     Alcohol abuse Neg Hx     Substance Abuse Neg Hx     Mental illness Neg Hx          Medications have been verified.        Objective   /80   Pulse 100   Temp 98 °F (36.7 °C)   Resp 18   SpO2 99%   No LMP for male patient.       Physical Exam     Physical Exam  Constitutional:       General: He is not in acute distress.     Appearance: He is not " ill-appearing.   HENT:      Head:      Comments: Low set ears. Broad nasal bridge.   Cardiovascular:      Rate and Rhythm: Normal rate.   Pulmonary:      Effort: Pulmonary effort is normal. No respiratory distress.   Abdominal:      General: There is no distension.      Tenderness: There is no abdominal tenderness. There is no right CVA tenderness or left CVA tenderness.   Neurological:      Mental Status: He is alert and oriented to person, place, and time.   Psychiatric:         Attention and Perception: Attention normal.         Mood and Affect: Affect is tearful.         Cognition and Memory: Cognition is impaired (at baseline).               Lisbeth Banuelos DO

## 2024-08-27 LAB — BACTERIA UR CULT: NORMAL

## 2024-08-30 NOTE — TELEPHONE ENCOUNTER
PA for Clobetasol 0.05% solution SUBMITTED     via    [x]CMM-KEY: [id number]  []SurescriFloqq-Case ID #   []Faxed to plan   []Other website   []Phone call Case ID #     Office notes sent, clinical questions answered. Awaiting determination    Turnaround time for your insurance to make a decision on your Prior Authorization can take 7-21 business days.

## 2024-09-12 ENCOUNTER — OFFICE VISIT (OUTPATIENT)
Dept: FAMILY MEDICINE CLINIC | Facility: CLINIC | Age: 64
End: 2024-09-12
Payer: MEDICARE

## 2024-09-12 VITALS
SYSTOLIC BLOOD PRESSURE: 120 MMHG | OXYGEN SATURATION: 98 % | WEIGHT: 109.8 LBS | RESPIRATION RATE: 15 BRPM | HEART RATE: 84 BPM | DIASTOLIC BLOOD PRESSURE: 78 MMHG | BODY MASS INDEX: 25.75 KG/M2

## 2024-09-12 DIAGNOSIS — L21.9 SEBORRHEIC DERMATITIS: ICD-10-CM

## 2024-09-12 DIAGNOSIS — R32 URINARY INCONTINENCE, UNSPECIFIED TYPE: ICD-10-CM

## 2024-09-12 DIAGNOSIS — E55.9 VITAMIN D DEFICIENCY: ICD-10-CM

## 2024-09-12 DIAGNOSIS — I10 PRIMARY HYPERTENSION: Primary | ICD-10-CM

## 2024-09-12 DIAGNOSIS — Q93.89 JACOBSEN SYNDROME: ICD-10-CM

## 2024-09-12 DIAGNOSIS — F79 INTELLECTUAL DISABILITY: ICD-10-CM

## 2024-09-12 DIAGNOSIS — Z12.5 SCREENING FOR PROSTATE CANCER: ICD-10-CM

## 2024-09-12 DIAGNOSIS — M41.9 KYPHOSCOLIOSIS AND SCOLIOSIS: ICD-10-CM

## 2024-09-12 DIAGNOSIS — D3A.8 NEUROENDOCRINE TUMOR OF PANCREAS: ICD-10-CM

## 2024-09-12 DIAGNOSIS — E78.2 MIXED HYPERLIPIDEMIA: ICD-10-CM

## 2024-09-12 DIAGNOSIS — Z00.00 MEDICARE ANNUAL WELLNESS VISIT, SUBSEQUENT: ICD-10-CM

## 2024-09-12 DIAGNOSIS — K21.9 GASTROESOPHAGEAL REFLUX DISEASE, UNSPECIFIED WHETHER ESOPHAGITIS PRESENT: ICD-10-CM

## 2024-09-12 PROBLEM — T17.900A INHALATION OF LIQUID OR VOMITUS, LOWER RESPIRATORY TRACT: Status: RESOLVED | Noted: 2023-07-03 | Resolved: 2024-09-12

## 2024-09-12 PROBLEM — R09.02 HYPOXIA: Status: RESOLVED | Noted: 2023-07-03 | Resolved: 2024-09-12

## 2024-09-12 PROCEDURE — G0439 PPPS, SUBSEQ VISIT: HCPCS | Performed by: FAMILY MEDICINE

## 2024-09-12 PROCEDURE — 99214 OFFICE O/P EST MOD 30 MIN: CPT | Performed by: FAMILY MEDICINE

## 2024-09-12 RX ORDER — MOMETASONE FUROATE 1 MG/G
CREAM TOPICAL DAILY PRN
Start: 2024-09-12

## 2024-09-12 NOTE — PROGRESS NOTES
Ambulatory Visit  Name: Jhony Bruno      : 1960      MRN: [id number]  Encounter Provider: Laura Christopher DO  Encounter Date: 2024   Encounter department: [facility]    Assessment & Plan  Medicare annual wellness visit, subsequent         Seborrheic dermatitis    Orders:    mometasone (ELOCON) 0.1 % cream; Apply topically daily as needed (red in face)    Gastroesophageal reflux disease, unspecified whether esophagitis present         Intellectual disability         Kevin Syndrome         Kyphoscoliosis and scoliosis         Neuroendocrine tumor of pancreas         Primary hypertension    Orders:    Comprehensive metabolic panel; Future    CBC and differential; Future    TSH, 3rd generation with Free T4 reflex; Future    UA (URINE) with reflex to Scope; Future    Urinary incontinence, unspecified type         Mixed hyperlipidemia    Orders:    Lipid panel; Future    Vitamin D deficiency    Orders:    Vitamin D 25 hydroxy; Future      Depression Screening and Follow-up Plan: Patient was screened for depression during today's encounter. They screened negative with a PHQ-9 score of 0.      Preventive health issues were discussed with patient, and age appropriate screening tests were ordered as noted in patient's After Visit Summary. Personalized health advice and appropriate referrals for health education or preventive services given if needed, as noted in patient's After Visit Summary.    History of Present Illness     Hyperlipidemia       Patient Care Team:  Laura Christopher DO as PCP - General (Family Medicine)    Review of Systems  Medical History Reviewed by provider this encounter:  Tobacco  Allergies  Meds  Problems  Med Hx  Surg Hx  Fam Hx       Annual Wellness Visit Questionnaire   Jhony is here for his Subsequent Wellness visit.     Health Risk Assessment:   Patient rates overall health as good. Patient feels that their physical health rating is  same. Patient is satisfied with their life. Eyesight was rated as same. Hearing was rated as same. Patient feels that their emotional and mental health rating is same. Patient states they are sometimes unusually tired/fatigued. Pain experienced in the last 7 days has been none. Patient states that he has experienced no weight loss or gain in last 6 months.     Depression Screening:   PHQ-9 Score: 0      Fall Risk Screening:   In the past year, patient has experienced: no history of falling in past year      Home Safety:  Patient does not have trouble with stairs inside or outside of their home. Patient has working smoke alarms and has working carbon monoxide detector. Home safety hazards include: none.     Nutrition:   Current diet is Regular.     Medications:   Patient is currently taking over-the-counter supplements. OTC medications include: see medication list. Patient is not able to manage medications. Living Facility manage patient medications.     Activities of Daily Living (ADLs)/Instrumental Activities of Daily Living (IADLs):   Walk and transfer into and out of bed and chair?: Yes  Dress and groom yourself?: Yes    Bathe or shower yourself?: Yes    Feed yourself? Yes  Do your laundry/housekeeping?: No  Manage your money, pay your bills and track your expenses?: No  Make your own meals?: No    Do your own shopping?: No    Previous Hospitalizations:   Any hospitalizations or ED visits within the last 12 months?: Yes    How many hospitalizations have you had in the last year?: 1-2    Advance Care Planning:   Living will: No    Durable POA for healthcare: No    Advanced directive: No    Advanced directive counseling given: Yes    End of Life Decisions reviewed with patient: Yes    Provider agrees with end of life decisions: Yes      Comments: Sister to have POA done.  Patient verbalizes that he wants his sister to make his medical decisions and he seems very capable of making this decision at this  time    Cognitive Screening:   Provider or family/friend/caregiver concerned regarding cognition?: No    PREVENTIVE SCREENINGS      Cardiovascular Screening:    General: Screening Not Indicated and History Lipid Disorder      Diabetes Screening:     General: Screening Current      Prostate Cancer Screening:    General: Screening Current      Lung Cancer Screening:     General: Screening Not Indicated and History Lung Cancer      Hepatitis C Screening:    General: Screening Current    Screening, Brief Intervention, and Referral to Treatment (SBIRT)    Screening  Typical number of drinks in a day: 0  Typical number of drinks in a week: 0  Interpretation: Low risk drinking behavior.    Single Item Drug Screening:  How often have you used an illegal drug (including marijuana) or a prescription medication for non-medical reasons in the past year? never    Single Item Drug Screen Score: 0  Interpretation: Negative screen for possible drug use disorder    SDOH Risk Assessment  Social determinants of health (SDOH) risk assesment tool was completed. The tool at a minimum covered housing stability, food insecurity, transportation needs, and utility difficulty. Patient had at risk responses for the following SDOH domains: housing stability.     Social Determinants of Health     Financial Resource Strain: Low Risk  (8/10/2023)    Overall Financial Resource Strain (CARDIA)     Difficulty of Paying Living Expenses: Not very hard   Food Insecurity: No Food Insecurity (9/12/2024)    Hunger Vital Sign     Worried About Running Out of Food in the Last Year: Never true     Ran Out of Food in the Last Year: Never true   Transportation Needs: No Transportation Needs (9/12/2024)    PRAPARE - Transportation     Lack of Transportation (Medical): No     Lack of Transportation (Non-Medical): No   Housing Stability: High Risk (9/12/2024)    Housing Stability Vital Sign     Unable to Pay for Housing in the Last Year: No     Number of Times  Moved in the Last Year: 2     Homeless in the Last Year: No   Utilities: Not At Risk (9/12/2024)    [facility] Utilities     Threatened with loss of utilities: No     No results found.    Objective     /78   Pulse 84   Resp 15   Wt 49.8 kg (109 lb 12.8 oz)   SpO2 98%   BMI 25.75 kg/m²     Physical Exam

## 2024-09-12 NOTE — ASSESSMENT & PLAN NOTE
Orders:    Comprehensive metabolic panel; Future    CBC and differential; Future    TSH, 3rd generation with Free T4 reflex; Future    UA (URINE) with reflex to Scope; Future

## 2024-09-12 NOTE — PATIENT INSTRUCTIONS
Patient will get COVID shot at pharmacy or will be taken care of by his housemates  2 weeks in between he will have a flu shot done  Will be seen in 6 months and will have blood work done and urine prior to coming in  Will be seen sooner if needed      Medicare Preventive Visit Patient Instructions  Thank you for completing your Welcome to Medicare Visit or Medicare Annual Wellness Visit today. Your next wellness visit will be due in one year (9/13/2025).  The screening/preventive services that you may require over the next 5-10 years are detailed below. Some tests may not apply to you based off risk factors and/or age. Screening tests ordered at today's visit but not completed yet may show as past due. Also, please note that scanned in results may not display below.  Preventive Screenings:  Service Recommendations Previous Testing/Comments   Colorectal Cancer Screening  Colonoscopy    Fecal Occult Blood Test (FOBT)/Fecal Immunochemical Test (FIT)  Fecal DNA/Cologuard Test  Flexible Sigmoidoscopy Age: 45-75 years old   Colonoscopy: every 10 years (May be performed more frequently if at higher risk)  OR  FOBT/FIT: every 1 year  OR  Cologuard: every 3 years  OR  Sigmoidoscopy: every 5 years  Screening may be recommended earlier than age 45 if at higher risk for colorectal cancer. Also, an individualized decision between you and your healthcare provider will decide whether screening between the ages of 76-85 would be appropriate. Colonoscopy: Not on file  FOBT/FIT: Not on file  Cologuard: Not on file  Sigmoidoscopy: Not on file          Prostate Cancer Screening Individualized decision between patient and health care provider in men between ages of 55-69   Medicare will cover every 12 months beginning on the day after your 50th birthday PSA: 0.12 ng/mL     Screening Current     Hepatitis C Screening Once for adults born between 1945 and 1965  More frequently in patients at high risk for Hepatitis C Hep C Antibody:  02/15/2023    Screening Current   Diabetes Screening 1-2 times per year if you're at risk for diabetes or have pre-diabetes Fasting glucose: 88 mg/dL (3/6/2024)  A1C: No results in last 5 years (No results in last 5 years)  Screening Current   Cholesterol Screening Once every 5 years if you don't have a lipid disorder. May order more often based on risk factors. Lipid panel: 03/06/2024  Screening Not Indicated  History Lipid Disorder      Other Preventive Screenings Covered by Medicare:  Abdominal Aortic Aneurysm (AAA) Screening: covered once if your at risk. You're considered to be at risk if you have a family history of AAA or a male between the age of 65-75 who smoking at least 100 cigarettes in your lifetime.  Lung Cancer Screening: covers low dose CT scan once per year if you meet all of the following conditions: (1) Age 55-77; (2) No signs or symptoms of lung cancer; (3) Current smoker or have quit smoking within the last 15 years; (4) You have a tobacco smoking history of at least 20 pack years (packs per day x number of years you smoked); (5) You get a written order from a healthcare provider.  Glaucoma Screening: covered annually if you're considered high risk: (1) You have diabetes OR (2) Family history of glaucoma OR (3)  aged 50 and older OR (4)  American aged 65 and older  Osteoporosis Screening: covered every 2 years if you meet one of the following conditions: (1) Have a vertebral abnormality; (2) On glucocorticoid therapy for more than 3 months; (3) Have primary hyperparathyroidism; (4) On osteoporosis medications and need to assess response to drug therapy.  HIV Screening: covered annually if you're between the age of 15-65. Also covered annually if you are younger than 15 and older than 65 with risk factors for HIV infection. For pregnant patients, it is covered up to 3 times per pregnancy.    Immunizations:  Immunization Recommendations   Influenza Vaccine Annual influenza  vaccination during flu season is recommended for all persons aged >= 6 months who do not have contraindications   Pneumococcal Vaccine   * Pneumococcal conjugate vaccine = PCV13 (Prevnar 13), PCV15 (Vaxneuvance), PCV20 (Prevnar 20)  * Pneumococcal polysaccharide vaccine = PPSV23 (Pneumovax) Adults 19-63 yo with certain risk factors or if 65+ yo  If never received any pneumonia vaccine: recommend Prevnar 20 (PCV20)  Give PCV20 if previously received 1 dose of PCV13 or PPSV23   Hepatitis B Vaccine 3 dose series if at intermediate or high risk (ex: diabetes, end stage renal disease, liver disease)   Respiratory syncytial virus (RSV) Vaccine - COVERED BY MEDICARE PART D  * RSVPreF3 (Arexvy) CDC recommends that adults 60 years of age and older may receive a single dose of RSV vaccine using shared clinical decision-making (SCDM)   Tetanus (Td) Vaccine - COST NOT COVERED BY MEDICARE PART B Following completion of primary series, a booster dose should be given every 10 years to maintain immunity against tetanus. Td may also be given as tetanus wound prophylaxis.   Tdap Vaccine - COST NOT COVERED BY MEDICARE PART B Recommended at least once for all adults. For pregnant patients, recommended with each pregnancy.   Shingles Vaccine (Shingrix) - COST NOT COVERED BY MEDICARE PART B  2 shot series recommended in those 19 years and older who have or will have weakened immune systems or those 50 years and older     Health Maintenance Due:      Topic Date Due    Colorectal Cancer Screening  12/13/2024 (Originally 11/10/2006)    HIV Screening  02/03/2025 (Originally 11/10/1975)    Hepatitis C Screening  Completed     Immunizations Due:      Topic Date Due    COVID-19 Vaccine (2 - 2023-24 season) 09/01/2024    Influenza Vaccine (1) 09/01/2024     Advance Directives   What are advance directives?  Advance directives are legal documents that state your wishes and plans for medical care. These plans are made ahead of time in case you  lose your ability to make decisions for yourself. Advance directives can apply to any medical decision, such as the treatments you want, and if you want to donate organs.   What are the types of advance directives?  There are many types of advance directives, and each state has rules about how to use them. You may choose a combination of any of the following:  Living will:  This is a written record of the treatment you want. You can also choose which treatments you do not want, which to limit, and which to stop at a certain time. This includes surgery, medicine, IV fluid, and tube feedings.   Durable power of  for healthcare (DPAHC):  This is a written record that states who you want to make healthcare choices for you when you are unable to make them for yourself. This person, called a proxy, is usually a family member or a friend. You may choose more than 1 proxy.  Do not resuscitate (DNR) order:  A DNR order is used in case your heart stops beating or you stop breathing. It is a request not to have certain forms of treatment, such as CPR. A DNR order may be included in other types of advance directives.  Medical directive:  This covers the care that you want if you are in a coma, near death, or unable to make decisions for yourself. You can list the treatments you want for each condition. Treatment may include pain medicine, surgery, blood transfusions, dialysis, IV or tube feedings, and a ventilator (breathing machine).  Values history:  This document has questions about your views, beliefs, and how you feel and think about life. This information can help others choose the care that you would choose.  Why are advance directives important?  An advance directive helps you control your care. Although spoken wishes may be used, it is better to have your wishes written down. Spoken wishes can be misunderstood, or not followed. Treatments may be given even if you do not want them. An advance directive may make  it easier for your family to make difficult choices about your care.   Weight Management   Why it is important to manage your weight:  Being overweight increases your risk of health conditions such as heart disease, high blood pressure, type 2 diabetes, and certain types of cancer. It can also increase your risk for osteoarthritis, sleep apnea, and other respiratory problems. Aim for a slow, steady weight loss. Even a small amount of weight loss can lower your risk of health problems.  How to lose weight safely:  A safe and healthy way to lose weight is to eat fewer calories and get regular exercise. You can lose up about 1 pound a week by decreasing the number of calories you eat by 500 calories each day.   Healthy meal plan for weight management:  A healthy meal plan includes a variety of foods, contains fewer calories, and helps you stay healthy. A healthy meal plan includes the following:  Eat whole-grain foods more often.  A healthy meal plan should contain fiber. Fiber is the part of grains, fruits, and vegetables that is not broken down by your body. Whole-grain foods are healthy and provide extra fiber in your diet. Some examples of whole-grain foods are whole-wheat breads and pastas, oatmeal, brown rice, and bulgur.  Eat a variety of vegetables every day.  Include dark, leafy greens such as spinach, kale, aroldo greens, and mustard greens. Eat yellow and orange vegetables such as carrots, sweet potatoes, and winter squash.   Eat a variety of fruits every day.  Choose fresh or canned fruit (canned in its own juice or light syrup) instead of juice. Fruit juice has very little or no fiber.  Eat low-fat dairy foods.  Drink fat-free (skim) milk or 1% milk. Eat fat-free yogurt and low-fat cottage cheese. Try low-fat cheeses such as mozzarella and other reduced-fat cheeses.  Choose meat and other protein foods that are low in fat.  Choose beans or other legumes such as split peas or lentils. Choose fish, skinless  poultry (chicken or turkey), or lean cuts of red meat (beef or pork). Before you cook meat or poultry, cut off any visible fat.   Use less fat and oil.  Try baking foods instead of frying them. Add less fat, such as margarine, sour cream, regular salad dressing and mayonnaise to foods. Eat fewer high-fat foods. Some examples of high-fat foods include french fries, doughnuts, ice cream, and cakes.  Eat fewer sweets.  Limit foods and drinks that are high in sugar. This includes candy, cookies, regular soda, and sweetened drinks.  Exercise:  Exercise at least 30 minutes per day on most days of the week. Some examples of exercise include walking, biking, dancing, and swimming. You can also fit in more physical activity by taking the stairs instead of the elevator or parking farther away from stores. Ask your healthcare provider about the best exercise plan for you.      © Copyright [employer] 2018 Information is for End User's use only and may not be sold, redistributed or otherwise used for commercial purposes. All illustrations and images included in CareNotes® are the copyrighted property of [employer]., Inc[employer] or [employer]

## 2024-09-12 NOTE — PROGRESS NOTES
ASSESSMENT/PLAN:    Hoarse voice  Resolved with stopping all the nebulizations  Patient feels better  We will keep nebulizer on hand because of his kyphoscoliosis and propensity towards bronchitis for treatments in the future  In the meantime he will get his COVID shot soon  Will get flu shot at least 2 weeks apart    Seborrheic keratosis  Saw dermatology who gave prescription antifungal meds and mometasone cream.    Patient can continue to do this or can  Continue alternating bottles of Selsun Blue with bottles of head and shoulders  Mometasone to his face hotspots every night     Kyphosis and scoliosis causing mechanical decrease in lung volumes  Of aspiration  Clear now     GERD  Continue omeprazole and take his time eating  Did very well with speech therapy and is now slowly eating and not aspirating      Restless leg  Tums Ultra every night at dinnertime  Restless legs have disappeared     Hypertension  Continue Toprol  120/78     Depression  Dizziness with short acting Wellbutrin  Totally went away with long-acting Wellbutrin     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     Allergic rhinitis  When appropriate patient takes Claritin and Patanol as needed      recheck as scheduled for PE and for blood work  Recheck as needed otherwise             Health Maintenance   Topic Date Due    Zoster Vaccine (1 of 2) Never done    RSV Vaccine Age 60+ Years (1 - 1-dose 60+ series) Never done    Medicare Annual Wellness Visit (AWV)  08/10/2024    COVID-19 Vaccine (2 - 2023-24 season)  09/01/2024    Influenza Vaccine (1) 09/01/2024    Colorectal Cancer Screening  12/13/2024 (Originally 11/10/2006)    HIV Screening  02/03/2025 (Originally 11/10/1975)    Depression Screening  09/12/2025    Hepatitis C Screening  Completed    RSV Vaccine age 0-20 Months  Aged Out    Pneumococcal Vaccine: Pediatrics (0 to 5 Years) and At-Risk Patients (6 to 64 Years)  Aged Out    HIB Vaccine  Aged Out    IPV Vaccine  Aged Out    Hepatitis A Vaccine  Aged Out    Meningococcal ACWY Vaccine  Aged Out    HPV Vaccine  Aged Out         Problem List as of 9/12/2024 Reviewed: 3/12/2024  5:18 PM by Laura Christopher DO      Abnormal gait    Allergic rhinitis    BPH (benign prostatic hyperplasia)    GERD (gastroesophageal reflux disease)    Last Assessment & Plan 6/23/2023 Hospital Encounter Written 6/27/2023  1:46 PM by Carrie Anne PA-C     Home regimen: Omeprazole 40 Mg daily and Carafate 1 g twice daily  Continue home medication         Hypoxia    Inhalation of liquid or vomitus, lower respiratory tract    Intellectual disability    Last Assessment & Plan 6/23/2023 Hospital Encounter Written 6/27/2023  1:46 PM by Carrie Anne PA-C     Currently living with his sister with plans to move into a group home once available         Kevin Syndrome    Kyphoscoliosis and scoliosis    Mixed hyperlipidemia    Neuroendocrine tumor of pancreas    Other psoriasis    Primary hypertension    Last Assessment & Plan 6/23/2023 Hospital Encounter Written 6/27/2023  1:46 PM by Carrie Anne PA-C     Home regimen: metoprolol 25 Mg daily  Continue Toprol-XL         Recurrent major depressive disorder, in full remission (HCC)    Restless leg    Sensorineural hearing loss    Urinary incontinence         Subjective:   Chief Complaint   Patient presents with    Hyperlipidemia    GERD    Medicare Wellness Visit     HPI    patient ID: Jhony Bruno is a 63 y.o. male.    Patient's past medical history, surgical history, family history,  social history, and Tobacco history reviewed with patient.     MED LIST WAS REVIEWED AND UPDATED    ROS  As per HPI  Rest of 12 point review of systems negative     Objective:      VITALS:  Wt Readings from Last 3 Encounters:   09/12/24 49.8 kg (109 lb 12.8 oz)   08/05/24 49.3 kg (108 lb 11.2 oz)   07/12/24 50.8 kg (112 lb)     BP Readings from Last 3 Encounters:   09/12/24 120/78   08/26/24 133/80   08/05/24 120/78     Pulse Readings from Last 3 Encounters:   09/12/24 84   08/26/24 100   08/05/24 70     Body mass index is 25.75 kg/m².    Laboratory Results:   All pertinent labs and studies were reviewed with patient during this office visit with highlights of the results contained in this note in the ASSESSMENT AND PLAN section       Physical Exam

## 2024-09-19 ENCOUNTER — OFFICE VISIT (OUTPATIENT)
Dept: URGENT CARE | Facility: MEDICAL CENTER | Age: 64
End: 2024-09-19
Payer: MEDICARE

## 2024-09-19 VITALS
DIASTOLIC BLOOD PRESSURE: 70 MMHG | WEIGHT: 110.4 LBS | RESPIRATION RATE: 18 BRPM | HEART RATE: 81 BPM | SYSTOLIC BLOOD PRESSURE: 139 MMHG | OXYGEN SATURATION: 94 % | BODY MASS INDEX: 25.89 KG/M2 | TEMPERATURE: 97.9 F

## 2024-09-19 DIAGNOSIS — S81.812A LACERATION OF LEFT LOWER EXTREMITY, INITIAL ENCOUNTER: Primary | ICD-10-CM

## 2024-09-19 DIAGNOSIS — Z23 ENCOUNTER FOR IMMUNIZATION: ICD-10-CM

## 2024-09-19 PROCEDURE — 90715 TDAP VACCINE 7 YRS/> IM: CPT

## 2024-09-19 PROCEDURE — G0463 HOSPITAL OUTPT CLINIC VISIT: HCPCS | Performed by: FAMILY MEDICINE

## 2024-09-19 PROCEDURE — 90471 IMMUNIZATION ADMIN: CPT | Performed by: FAMILY MEDICINE

## 2024-09-19 PROCEDURE — 99213 OFFICE O/P EST LOW 20 MIN: CPT | Performed by: FAMILY MEDICINE

## 2024-09-19 NOTE — PATIENT INSTRUCTIONS
Under sterile fashion and under field anesthesia using 1% lidocaine with epinephrine I was able to approximate the wound with 3-0 nylon sutures.  7 sutures were placed.  Patient tolerated procedure well.  Bacitracin applied directly to the wound followed by nonadherent dressing kept in place with Brisa.  I advised the patient's personal care assistant to change dressing within 24 hours daily and he may return in approximately 10 days, and if ready, sutures can be removed.  Also to observe for any signs of wound infection.  Patient was given a tetanus booster.    Patient Education     Laceration Repair With Stitches ED   General Information   You came to the Emergency Department (ED) for a cut in your skin. Doctors closed the cut on your skin with stitches that don’t dissolve. Stitches are a special kind of thread. Your wound may drain a small amount of clear yellow fluid in the first few days. This is normal. In a week or so, the doctor has to take out the kind of stitches you had placed.  What care is needed at home?   Call your regular doctor to let them know you were in the ED. Make a follow-up appointment if you were told to.  Keep your wound clean and dry for the first 24 hours. After 24 hours, you can gently wash the wound with soap and water or take a shower.  You may apply an antibiotic ointment to the wound 1 to 2 times each day. If you want, you can cover your wound with a bandage. You can also leave it open to air if you prefer.  Wash your hands before and after you touch your wound or bandage.  Avoid activities that could hurt the area of your stitches for 1 to 2 weeks. If you hurt the same part of your body again, stitches can break, and the cut can open up again.  Do not try to take out the stitches yourself. Your stitches need to be removed on ______________.  When do I need to call the doctor?   You have a fever of 100.4°F (38°C) or higher or chills.  Your wound is swollen, red, or warm  Your wound  has thick yellow or green drainage.  The wound opens up.  You have new or worsening symptoms.  Last Reviewed Date   2021-05-05  Consumer Information Use and Disclaimer   This generalized information is a limited summary of diagnosis, treatment, and/or medication information. It is not meant to be comprehensive and should be used as a tool to help the user understand and/or assess potential diagnostic and treatment options. It does NOT include all information about conditions, treatments, medications, side effects, or risks that may apply to a specific patient. It is not intended to be medical advice or a substitute for the medical advice, diagnosis, or treatment of a health care provider based on the health care provider's examination and assessment of a patient’s specific and unique circumstances. Patients must speak with a health care provider for complete information about their health, medical questions, and treatment options, including any risks or benefits regarding use of medications. This information does not endorse any treatments or medications as safe, effective, or approved for treating a specific patient. [employer] and its affiliates disclaim any warranty or liability relating to this information or the use thereof. The use of this information is governed by the Terms of Use, available at https://www.[employer].com/en/know/clinical-effectiveness-terms   Copyright   Copyright © 2024 [employer] and its affiliates and/or licensors. All rights reserved.

## 2024-09-19 NOTE — PROGRESS NOTES
[facility]        NAME: Jhony Bruno is a 63 y.o. male  : 1960    MRN: [id number]  DATE: 2024  TIME: 7:10 PM    Assessment and Plan   Laceration of left lower extremity, initial encounter [S81.812A]  1. Laceration of left lower extremity, initial encounter  Tdap Vaccine greater than or equal to 8yo      2. Encounter for immunization  Tdap Vaccine greater than or equal to 8yo            Patient Instructions       Follow up with PCP in 3-5 days.  Proceed to  ER if symptoms worsen.    If tests have been performed at [facility], our office will contact you with results if changes need to be made to the care plan discussed with you at the visit.  [facility]    Chief Complaint     Chief Complaint   Patient presents with    Leg Pain     Left leg laceration. It occurred today about 2 hours ago. Wound is covered. He hit the car door. He is unsure when the last tetanus was. He feels okay other than the cut.         History of Present Illness       63-year-old male, who lives in a group home.  He is accompanied by his personal care associate who gives most of the history since patient is not verbal.  Patient sustained a laceration of his left lower leg about 2 hours ago as he was exiting her car.  He sustained a laceration on the outer part of his leg which bled significantly.  His personal care assistant applied a dressing and brought him immediately to urgent care.  He is unsure when his last tetanus shot was.  Refers to minimal pain    Leg Pain         Review of Systems   Review of Systems   Skin:  Positive for wound.         Current Medications       Current Outpatient Medications:     acetaminophen (TYLENOL) 325 mg tablet, Take 2 tablets (650 mg total) by mouth every 4 (four) hours as needed for moderate pain or fever, Disp: 100 tablet, Rfl: 3    albuterol (2.5 mg/3 mL) 0.083 % nebulizer solution, Take 3 mL (2.5 mg total) by nebulization 2  (two) times a day as needed for wheezing or shortness of breath (And cough), Disp: 180 mL, Rfl: 1    buPROPion (WELLBUTRIN XL) 150 mg 24 hr tablet, Take 1 tablet (150 mg total) by mouth every morning, Disp: 90 tablet, Rfl: 2    calcium carbonate (TUMS ULTRA) 1000 MG chewable tablet, Chew 1 tablet (1,000 mg total) 2 (two) times a day, Disp: 180 tablet, Rfl: 3    clobetasol (TEMOVATE) 0.05 % external solution, Apply BID for up to 2 weeks to affected skin on scalp prn flares then take one week break and can repeat regimen prn flares. Do not apply to groin, skin folds, face., Disp: 50 mL, Rfl: 4    clotrimazole (LOTRIMIN) 1 % cream, Apply topically 2 (two) times a day as needed (Itchy rash), Disp: 40 g, Rfl: 3    dextromethorphan-guaifenesin (MUCINEX DM)  MG per 12 hr tablet, Take 1 tablet by mouth as needed for cough, Disp: , Rfl:     guaiFENesin (MUCINEX) 600 mg 12 hr tablet, Take 2 tablets (1,200 mg total) by mouth every 12 (twelve) hours, Disp: 180 tablet, Rfl: 3    hydrocortisone 2.5 % ointment, Apply topically 2 (two) times a day For no longer than 5-7 days to affected skin on the face. Use only for flares, and take 1-2 week break between using., Disp: 30 g, Rfl: 1    ketoconazole (NIZORAL) 2 % cream, Apply topically daily to affected skin on the face when flaring, and 3-4 times per week for maintenance when under good control, Disp: 30 g, Rfl: 10    ketoconazole (NIZORAL) 2 % shampoo, Use as shampoo at least 3 times per week to scalp. Lather into scalp and let sit for 5 minutes before rinsing., Disp: 100 mL, Rfl: 10    loratadine (CLARITIN) 10 mg tablet, Take 1 tablet (10 mg total) by mouth daily as needed for allergies, Disp: 90 tablet, Rfl: 3    metoprolol succinate (TOPROL-XL) 25 mg 24 hr tablet, Take 1 tablet (25 mg total) by mouth daily at bedtime, Disp: 90 tablet, Rfl: 3    mometasone (ELOCON) 0.1 % cream, Apply topically daily as needed (red in face), Disp: , Rfl:     olopatadine (PATANOL) 0.1 %  "ophthalmic solution, Administer 1 drop to both eyes 2 (two) times a day as needed for allergies, Disp: 5 mL, Rfl: 5    omeprazole (PriLOSEC) 40 MG capsule, Take 1 capsule (40 mg total) by mouth daily, Disp: 90 capsule, Rfl: 3    polyethylene glycol (MIRALAX) 17 g packet, Take 17 g by mouth daily, Disp: 100 each, Rfl: 3    simvastatin (ZOCOR) 40 mg tablet, Take 1 tablet (40 mg total) by mouth daily at bedtime, Disp: 90 tablet, Rfl: 3    terbinafine (LamISIL) 1 % cream, Apply topically 2 (two) times a day On the feet, in between the toes and on the toe nails, Disp: 42 g, Rfl: 3    Current Allergies     Allergies as of 09/19/2024 - Reviewed 09/19/2024   Allergen Reaction Noted    Levofloxacin Itching 10/14/2016    Codeine Nausea Only 11/30/2022    Augmentin [amoxicillin-pot clavulanate] Diarrhea 03/12/2024            The following portions of the patient's history were reviewed and updated as appropriate: allergies, current medications, past family history, past medical history, past social history, past surgical history and problem list.     Past Medical History:   Diagnosis Date    Anxiety 11/15/2016    Last Assessment & Plan:   Formatting of this note might be different from the original.  Agree with attempt to taper. Take zoloft 50mg every other day for a week, every 3rd day the following week. Then, discontinue it.      Depression     Dysphagia 06/23/2023    GERD (gastroesophageal reflux disease)     Hypoxia 07/03/2023    Inhalation of liquid or vomitus, lower respiratory tract 07/03/2023    Intellectual disability     Reactive depression 06/23/2023    Scoliosis, unspecified scoliosis type, unspecified spinal region        Past Surgical History:   Procedure Laterality Date    APPENDECTOMY      CHOLECYSTECTOMY      HERNIA REPAIR      PANCREAS SURGERY N/A     Removal of a mass perhaps 2018    PROSTATE SURGERY N/A     \"Laser prostate surgery \"       Family History   Problem Relation Age of Onset    Heart disease " Mother     Parkinsonism Mother     Heart disease Father     Alcohol abuse Neg Hx     Substance Abuse Neg Hx     Mental illness Neg Hx          Medications have been verified.        Objective   /70   Pulse 81   Temp 97.9 °F (36.6 °C) (Tympanic)   Resp 18   Wt 50.1 kg (110 lb 6.4 oz)   SpO2 94%   BMI 25.89 kg/m²   No LMP for male patient.       Physical Exam     Physical Exam  Constitutional:       Appearance: Normal appearance.   Skin:     Comments: Left leg, lateral aspect reveals a 3 cm laceration, angulated at the proximal aspect.  It extends into the dermis.  Not actively bleeding.  No arteries, nerves, veins seem to be affected.   Neurological:      Mental Status: He is alert.     Universal Protocol:  procedure performed by consultantConsent: Verbal consent obtained. Written consent not obtained.  Consent given by: patient and guardian  Patient understanding: patient states understanding of the procedure being performed  Patient identity confirmed: verbally with patient  Laceration repair    Date/Time: 9/19/2024 4:30 PM    Performed by: Chuck Oneill MD  Authorized by: Chuck Oneill MD  Body area: lower extremity  Location details: left lower leg  Laceration length: 3 cm  Foreign bodies: no foreign bodies  Tendon involvement: none  Nerve involvement: none  Vascular damage: no  Anesthesia: local infiltration    Anesthesia:  Local Anesthetic: lidocaine 1% with epinephrine  Anesthetic total: 8 mL    Sedation:  Patient sedated: no      Wound Dehiscence:  Superficial Wound Dehiscence: simple closure      Procedure Details:  Irrigation solution: saline  Amount of cleaning: standard  Debridement: none  Degree of undermining: none  Skin closure: 3-0 nylon  Number of sutures: 7  Technique: simple  Approximation: close  Approximation difficulty: simple  Dressing: 4x4 sterile gauze, antibiotic ointment and gauze roll  Patient tolerance: patient tolerated the procedure well with no immediate  complications

## 2024-09-25 ENCOUNTER — TELEPHONE (OUTPATIENT)
Dept: DERMATOLOGY | Facility: CLINIC | Age: 64
End: 2024-09-25

## 2024-09-25 ENCOUNTER — TELEPHONE (OUTPATIENT)
Age: 64
End: 2024-09-25

## 2024-09-25 NOTE — TELEPHONE ENCOUNTER
Nilson calling from [facility] where patient resides requesting order for Nystatin powder Bid for 14 days then PRN. Patient has redness, irritation in groin area. Please fax prescription to [facility] pharmacy [phone]. Thank you

## 2024-09-25 NOTE — TELEPHONE ENCOUNTER
LMOM that 12/02/24 appt w/Dr Young was cx & rs to Dr Albarado, due to change in his sched, please call the office to confirm or r/s to another date

## 2024-09-26 DIAGNOSIS — B35.6 TINEA CRURIS: Primary | ICD-10-CM

## 2024-09-26 RX ORDER — NYSTATIN 100000 [USP'U]/G
POWDER TOPICAL 2 TIMES DAILY
Qty: 60 G | Refills: 1 | Status: SHIPPED | OUTPATIENT
Start: 2024-09-26

## 2024-09-30 ENCOUNTER — OFFICE VISIT (OUTPATIENT)
Dept: FAMILY MEDICINE CLINIC | Facility: CLINIC | Age: 64
End: 2024-09-30
Payer: MEDICARE

## 2024-09-30 VITALS
RESPIRATION RATE: 15 BRPM | OXYGEN SATURATION: 98 % | WEIGHT: 109.3 LBS | BODY MASS INDEX: 25.64 KG/M2 | DIASTOLIC BLOOD PRESSURE: 80 MMHG | HEART RATE: 84 BPM | SYSTOLIC BLOOD PRESSURE: 124 MMHG | TEMPERATURE: 97.9 F

## 2024-09-30 DIAGNOSIS — S81.812D LACERATION OF LEFT LOWER EXTREMITY, SUBSEQUENT ENCOUNTER: Primary | ICD-10-CM

## 2024-09-30 DIAGNOSIS — Z23 NEED FOR VACCINATION: ICD-10-CM

## 2024-09-30 PROBLEM — S81.812A LACERATION OF LEFT LOWER EXTREMITY: Status: ACTIVE | Noted: 2024-09-30

## 2024-09-30 PROCEDURE — 99213 OFFICE O/P EST LOW 20 MIN: CPT | Performed by: FAMILY MEDICINE

## 2024-09-30 PROCEDURE — G0008 ADMIN INFLUENZA VIRUS VAC: HCPCS

## 2024-09-30 PROCEDURE — 90673 RIV3 VACCINE NO PRESERV IM: CPT

## 2024-09-30 NOTE — PATIENT INSTRUCTIONS
Patient may take a bath every day  Washed area gently with soap and water every day  Wash area gently before coming to the next visit on Thursday for removal of the rest of the sutures

## 2024-09-30 NOTE — PROGRESS NOTES
Assessment/Plan:    Leg laceration  Every other suture removed because skin is very thin and not totally healed at this time  Return in 3 days for the rest of the sutures to be removed  Lesion result prior  Call with any symptoms of infection  Patient able to take a bath           Subjective:   Jhony Bruno is a 63 y.o.male  Chief Complaint   Patient presents with    Suture / Staple Removal     Patient is here with his caregiver Kaz and his sister to look at a wound on his left lower leg that was sutured together in the ER 10 days ago.  It is tender and needs to have some of the stitches at least removed.  Patient caught his leg on an object that tore into a flap type situation      Past medical history, social history, and family history reviewed as appropriate for the complaint of this patient.      MEDICATIONS REVIEWED AND UPDATED    10 point review of systems performed, the remainder of the ROS is negative except for what is noted in the history of chief complaint    Objective:    Vitals:    09/30/24 0927   BP: 124/80   Pulse: 84   Resp: 15   Temp: 97.9 °F (36.6 °C)   SpO2: 98%     Body mass index is 25.64 kg/m².    Physical Exam    General  Patient in no acute distress, well appearing, well nourished and appears stated age    Mental status  Mentally slow but very appropriate and sweet.   oriented to time person and place, recent and remote memory is intact, mood and affect are normal and frightened, cooperative, and patient is reasonable.    Skin  Left lateral leg has a V-shaped laceration about one and half centimeters on each side

## 2024-10-03 ENCOUNTER — OFFICE VISIT (OUTPATIENT)
Dept: FAMILY MEDICINE CLINIC | Facility: CLINIC | Age: 64
End: 2024-10-03
Payer: MEDICARE

## 2024-10-03 VITALS
RESPIRATION RATE: 16 BRPM | OXYGEN SATURATION: 98 % | SYSTOLIC BLOOD PRESSURE: 120 MMHG | BODY MASS INDEX: 25.96 KG/M2 | WEIGHT: 110.7 LBS | TEMPERATURE: 97.8 F | DIASTOLIC BLOOD PRESSURE: 74 MMHG | HEART RATE: 64 BPM

## 2024-10-03 DIAGNOSIS — S81.812D LACERATION OF LEFT LOWER EXTREMITY, SUBSEQUENT ENCOUNTER: Primary | ICD-10-CM

## 2024-10-03 PROCEDURE — G2211 COMPLEX E/M VISIT ADD ON: HCPCS | Performed by: FAMILY MEDICINE

## 2024-10-03 PROCEDURE — 99213 OFFICE O/P EST LOW 20 MIN: CPT | Performed by: FAMILY MEDICINE

## 2024-10-03 NOTE — PATIENT INSTRUCTIONS
Do not pull the Steri-Strips off  If they happen the fall off you have replacements and pulled from the bottom of the laceration up  I would like them to stay on to hold this together for at least 2 weeks    Any signs of infection such as swelling, redness, thick white discharge or fevers please let us know

## 2024-10-03 NOTE — PROGRESS NOTES
Assessment/Plan:    Leg laceration  Benzoin applied with Steri-Strips  To keep the Steri-Strips in place for at least 2 weeks  Replacements given should any peel off             Subjective:   Jhony Bruno is a 63 y.o.male  Chief Complaint   Patient presents with    Follow-up     Patient is here for a recheck for his skin laceration        Past medical history, social history, and family history reviewed as appropriate for the complaint of this patient.      MEDICATIONS REVIEWED AND UPDATED    10 point review of systems performed, the remainder of the ROS is negative except for what is noted in the history of chief complaint    Objective:    Vitals:    10/03/24 1137   BP: 120/74   Pulse: 64   Resp: 16   Temp: 97.8 °F (36.6 °C)   SpO2: 98%     Body mass index is 25.96 kg/m².    Physical Exam    Skin  Appears to be healed well  Once sutures the 3 remaining were removed there was some pullback of the flap that he had

## 2024-10-30 PROBLEM — S81.812A LACERATION OF LEFT LOWER EXTREMITY: Status: RESOLVED | Noted: 2024-09-30 | Resolved: 2024-10-30

## 2024-11-06 ENCOUNTER — TELEPHONE (OUTPATIENT)
Age: 64
End: 2024-11-06

## 2024-11-06 NOTE — TELEPHONE ENCOUNTER
called for patient requesting a referral to audiology to be placed into the chart so that they can schedule an appointment with [facility]. Can someone please forward this to the provider so that this can be ordered?     thank you

## 2024-11-07 DIAGNOSIS — H91.93 BILATERAL HEARING LOSS, UNSPECIFIED HEARING LOSS TYPE: Primary | ICD-10-CM

## 2024-11-12 ENCOUNTER — OFFICE VISIT (OUTPATIENT)
Dept: AUDIOLOGY | Age: 64
End: 2024-11-12
Payer: MEDICARE

## 2024-11-12 DIAGNOSIS — H91.93 BILATERAL HEARING LOSS, UNSPECIFIED HEARING LOSS TYPE: ICD-10-CM

## 2024-11-12 DIAGNOSIS — H90.3 SENSORY HEARING LOSS, BILATERAL: Primary | ICD-10-CM

## 2024-11-12 PROCEDURE — 92552 PURE TONE AUDIOMETRY AIR: CPT | Performed by: AUDIOLOGIST

## 2024-11-12 PROCEDURE — 92567 TYMPANOMETRY: CPT | Performed by: AUDIOLOGIST

## 2024-11-12 PROCEDURE — 92556 SPEECH AUDIOMETRY COMPLETE: CPT | Performed by: AUDIOLOGIST

## 2024-11-12 NOTE — PROGRESS NOTES
Diagnostic Hearing Evaluation    Name:  Jhony Bruno  :  1960  Age:  64 y.o.   MRN:  [id number]  Date of Evaluation: 24     HISTORY:     Reason for visit: Known Hearing Loss binaurally    Jhony Bruno is being seen today at the request of Dr. Christopher for an initial  evaluation of hearing. The patient reports no issues or concerns. The patient  denies otalgia, otorrhea, dizziness, fullness, noise exposure, and family history of hearing loss.     EVALUATION:    Otoscopic Evaluation:   Right Ear: Unremarkable, canal clear   Left Ear: Unremarkable, canal clear    Tympanometry:   Right Ear: Type A; normal middle ear pressure and static compliance    Left Ear: Type A; normal middle ear pressure and static compliance     Speech Audiometry:  Speech Reception (SRT)    Right Ear: 50 dB HL    Left Ear: 40 dB HL    Word Recognition Scores (WRS):  Right Ear: excellent (96 % correct)     Left Ear: excellent (92 % correct)    Stimuli: [id number]    Pure Tone Audiometry:  Conventional pure tone audiometry from 250 - 8000 Hz  was obtained with good reliability and revealed the following:     Right Ear: Moderate sloping to severe sensorineural hearing loss (SNHL)    Left Ear: Moderate sloping to severe sensorineural hearing loss (SNHL)     *see attached audiogram    IMPRESSIONS:   Moderate to severe hearing loss bilaterally.    RECOMMENDATIONS:  Annual hearing eval, Return to [facility]. for F/U, Hearing Aid Evaluation, and Copy to Patient/Caregiver    PATIENT EDUCATION:   The results of today's results and recommendations were reviewed with the patient and his hearing thresholds were explained at length. Treatment options, including amplification and communication strategies, were discussed as appropriate. The patient voiced understanding of his test results. Questions were addressed and the patient was encouraged to contact our department should concerns arise.      Jory Lockhart, CCC-A  Clinical  Audiologist  [facility]  [address] [id number]

## 2024-11-20 ENCOUNTER — OFFICE VISIT (OUTPATIENT)
Dept: AUDIOLOGY | Age: 64
End: 2024-11-20

## 2024-11-20 DIAGNOSIS — H90.3 SENSORY HEARING LOSS, BILATERAL: Primary | ICD-10-CM

## 2024-11-21 NOTE — PROGRESS NOTES
Hearing Aid Evaluation  Name:  Jhony Bruno  :  1960  Age:  64 y.o.  MRN:  [id number]  Date of Evaluation: 24     HISTORY:    Jhony Bruno was seen today for a hearing aid evaluation following his audiometric testing performed on 24. Jhony was referred by Dr. Christopher.     RESULTS REVIEW:    The audiometric findings were reviewed with the patient and his sister. All of the patient's questions regarding his hearing status were addressed, and the importance of realistic expectations of hearing loss and amplification were discussed.      DEVICE REVIEW & RECOMMENDATION:     Strengths and limitations of amplification, including various hearing aid styles, technology, options, and accessories were discussed at length with patient and his sister. Hearing aids are assistive devices and are not designed to restore normal hearing. The patient and sister were counseled on the importance of self-advocacy, motivation, as well as effective communication strategies that can be used to optimize hearing aid success. Based on the degree of his hearing loss, preferences, and lifestyle needs, the following hearing recommendations were made:    The first hearing aid recommendation is Oticon REAL 1 Mini RITE-R.  The second hearing aid recommendation is Oticon REAL 2 Mini RITE-R.    [facility]'s office policies regarding our hearing aid program were reviewed, including the 45 day trial period, non-refundable return fees, as well as the  warranties and service plan. Hearing aid cost, and payment, as well as insurance benefit (if applicable) were discussed with the patient.     *See attached quote sheet    DEVICE SELECTION:    At this time, patient wishes to proceed with the purchase of the below listed hearing aid(s).     The patient selected the Oticon REAL 1 Mini RITE-R hearing aids.    Level: Premium   Color: Chroma Beige    size: Right 2 / Left 2   Dome size: 8DB   Accessories:       Devices ordered as specified above (order # [id number]).    Jory Lockhart, CCC-A  Clinical Audiologist  [facility]  [facility] [place] [id number]

## 2024-11-22 NOTE — PROGRESS NOTES
Progress Note    Name:  Jhony Bruno  :  1960  Age:  64 y.o.  MRN:  [id number]  Date of Evaluation: 24     Oticon Invoice [id number]  Real 1 miniRITE-R  SN: [id number] & [id number]   Warranty 27    Patient scheduled.     Jory Hilton CCC-A  Clinical Audiologist

## 2024-11-26 NOTE — PLAN OF CARE
Problem: Potential for Falls  Goal: Patient will remain free of falls  Description: INTERVENTIONS:  - Educate patient/family on patient safety including physical limitations  - Instruct patient to call for assistance with activity   - Consult OT/PT to assist with strengthening/mobility   - Keep Call bell within reach  - Keep bed low and locked with side rails adjusted as appropriate  - Keep care items and personal belongings within reach  - Initiate and maintain comfort rounds  - Make Fall Risk Sign visible to staff  - Offer Toileting every 2 Hours, in advance of need  - Initiate/Maintain bed/chair alarm  - Obtain necessary fall risk management equipment:   - Apply yellow socks and bracelet for high fall risk patients  - Consider moving patient to room near nurses station  Outcome: Progressing     Problem: Nutrition/Hydration-ADULT  Goal: Nutrient/Hydration intake appropriate for improving, restoring or maintaining nutritional needs  Description: Monitor and assess patient's nutrition/hydration status for malnutrition  Collaborate with interdisciplinary team and initiate plan and interventions as ordered  Monitor patient's weight and dietary intake as ordered or per policy  Utilize nutrition screening tool and intervene as necessary  Determine patient's food preferences and provide high-protein, high-caloric foods as appropriate       INTERVENTIONS:  - Monitor oral intake, urinary output, labs, and treatment plans  - Assess nutrition and hydration status and recommend course of action  - Evaluate amount of meals eaten  - Assist patient with eating if necessary   - Allow adequate time for meals  - Recommend/ encourage appropriate diets, oral nutritional supplements, and vitamin/mineral supplements  - Order, calculate, and assess calorie counts as needed  - Recommend, monitor, and adjust tube feedings and TPN/PPN based on assessed needs  - Assess need for intravenous fluids  - Provide specific nutrition/hydration [FreeTextEntry1] : 59F with HTN DM presents for f/u  HTN -cont toprol, lisinopril, norvasc  pt planning removal of kidney stone, no date set yet pt able to ambulate more than one block without  or sob (>4 mets) no syncope, hx of VT VF SCD no edema, orthopnea no hx of severe valvular dz. Pt is low risk for low to intermediate risk procedure and is optimized from a CV perspective and may proceed to procedure without further cardiac testing  f/u 6 months or sooner as needed  40 min spent on complete encounter.    [EKG obtained to assist in diagnosis and management of assessed problem(s)] : EKG obtained to assist in diagnosis and management of assessed problem(s) education as appropriate  - Include patient/family/caregiver in decisions related to nutrition  Outcome: Progressing     Problem: Prexisting or High Potential for Compromised Skin Integrity  Goal: Skin integrity is maintained or improved  Description: INTERVENTIONS:  - Identify patients at risk for skin breakdown  - Assess and monitor skin integrity  - Assess and monitor nutrition and hydration status  - Monitor labs   - Assess for incontinence   - Turn and reposition patient  - Assist with mobility/ambulation  - Relieve pressure over bony prominences  - Avoid friction and shearing  - Provide appropriate hygiene as needed including keeping skin clean and dry  - Evaluate need for skin moisturizer/barrier cream  - Collaborate with interdisciplinary team   - Patient/family teaching  - Consider wound care consult   Outcome: Progressing     Problem: MOBILITY - ADULT  Goal: Maintain or return to baseline ADL function  Description: INTERVENTIONS:  -  Assess patient's ability to carry out ADLs; assess patient's baseline for ADL function and identify physical deficits which impact ability to perform ADLs (bathing, care of mouth/teeth, toileting, grooming, dressing, etc )  - Assess/evaluate cause of self-care deficits   - Assess range of motion  - Assess patient's mobility; develop plan if impaired  - Assess patient's need for assistive devices and provide as appropriate  - Encourage maximum independence but intervene and supervise when necessary  - Involve family in performance of ADLs  - Assess for home care needs following discharge   - Consider OT consult to assist with ADL evaluation and planning for discharge  - Provide patient education as appropriate  Outcome: Progressing  Goal: Maintains/Returns to pre admission functional level  Description: INTERVENTIONS:  - Perform BMAT or MOVE assessment daily    - Set and communicate daily mobility goal to care team and patient/family/caregiver     - Collaborate with rehabilitation services on mobility goals if consulted  - Perform Range of Motion 3 times a day  - Reposition patient every 2 hours    - Dangle patient 3 times a day  - Stand patient 5 times a day  - Ambulate patient 3 times a day  - Out of bed to chair 3 times a day   - Out of bed for meals 3 times a day  - Out of bed for toileting  - Record patient progress and toleration of activity level   Outcome: Progressing     Problem: INFECTION - ADULT  Goal: Absence or prevention of progression during hospitalization  Description: INTERVENTIONS:  - Assess and monitor for signs and symptoms of infection  - Monitor lab/diagnostic results  - Monitor all insertion sites, i e  indwelling lines, tubes, and drains  - Monitor endotracheal if appropriate and nasal secretions for changes in amount and color  - [place] appropriate cooling/warming therapies per order  - Administer medications as ordered  - Instruct and encourage patient and family to use good hand hygiene technique  - Identify and instruct in appropriate isolation precautions for identified infection/condition  Outcome: Progressing  Goal: Absence of fever/infection during neutropenic period  Description: INTERVENTIONS:  - Monitor WBC    Outcome: Progressing     Problem: RESPIRATORY - ADULT  Goal: Achieves optimal ventilation and oxygenation  Description: INTERVENTIONS:  - Assess for changes in respiratory status  - Assess for changes in mentation and behavior  - Position to facilitate oxygenation and minimize respiratory effort  - Oxygen administered by appropriate delivery if ordered  - Initiate smoking cessation education as indicated  - Encourage broncho-pulmonary hygiene including cough, deep breathe, Incentive Spirometry  - Assess the need for suctioning and aspirate as needed  - Assess and instruct to report SOB or any respiratory difficulty  - Respiratory Therapy support as indicated  Outcome: Progressing

## 2024-11-27 ENCOUNTER — OFFICE VISIT (OUTPATIENT)
Dept: AUDIOLOGY | Age: 64
End: 2024-11-27

## 2024-11-27 DIAGNOSIS — H90.3 SENSORY HEARING LOSS, BILATERAL: Primary | ICD-10-CM

## 2024-11-27 NOTE — PROGRESS NOTES
Hearing Aid Fitting    Name:  Jhony Bruno  :  1960  Age:  64 y.o.  MRN:  [id number]  Date of Evaluation: 24     HISTORY:    Jhony Bruno was seen today for a binaural hearing aid fitting of his Oticon REAL 1  in the canal ([place]) hearing aid(s). Jhony was accompanied by his  Sister  to today's visit.. Hearing aid purchase is being paid by private Pay.    DEVICE INFORMATION:     Left Device Right Device   Hearing Aid Make: Oticon  Oticon    Hearing Aid Model: REAL 1 Mini RITE-R REAL 1 Mini RITE-R   Serial Number: [id number]   Repair Warranty Date: 27   Loss/Damage Warranty Status: Active  Active        Length/Output    Wax System: Pro Wax miniFIT Pro Wax miniFIT   Dome Size/Style:  8DB 8DB   Battery: Lithium-ion Rechargeable Lithium-ion Rechargeable      [employer]old Serial Number: N/A N/A   [employer]old Warranty Date:  N/A N/A    Serial Number:  [id number]    Warranty Date:  27     Accessories: N/A       DEVICE SETTINGS:    Hearing aid(s) were programmed using NAL-NL2 fitting formula and were adjusted based on the patient's perceived comfort level. Hearing aid(s) were set to experience level 1  per patient's subjective listening preference. The patient noted good sound quality, and was happy with the overall sound quality and fit of the hearing aid(s).    DEVICE ORIENTATION:    The patient was counseled on device components and component function. Proper insertion and removal of the aid(s) was demonstrated. The patient practiced insertion and removal of the devices in the office, they demonstrated excellent ability to manipulate the hearing aids. The patient  was given the devices users manual that reviews aid usage and operation, hearing aid cleaning tools, and hearing aid carrying case.     The hearing aid warranty, including unlimited repair and a one time loss and damage per hearing aid, through the , as well as [employer]'s hearing aid  service plan, including unlimited office visits, and supplies were outlined thoroughly. The patient agreed to the terms of sale listed on the purchase agreement containing device specifications, warranties, pricing information, as well as [facility]'s 45-day trial period timeline. After this period has elapsed, hearing aids cannot be returned.    DEVICE EXPECTATIONS & USAGE:     Hearing aids are assistive devices and are not designed to restore normal hearing sensitivity. The importance of realistic expectations, especially in the presence of background noise, was emphasized. The need for daily, consistent usage (8-12 hours per day) for proper device adjustment, as well as the importance of self-advocacy and practicing effective communication strategies was outlined.     Effective communication strategies include:  1.) Maintaining face-to-face communication, allowing for speechreading of facial expressions, lips, and gestures.  2.) Reducing background noise and distance between communication partners.  3.) Having communication partners reduce their rate of speech when appropriate.  4.) Beginning conversation by getting communication partner's attention.  5.) Asking for rephrasing of missed aspects of conversation rather than asking for repetition.    RECOMMENDATIONS:  The patient demonstrated understanding of all the topics discussed. The patientis to follow-up in 2-3 weeks for a hearing aid check within the trial period as scheduled.     Jory Lockhart, CCC-A  Clinical Audiologist   [facility]  [address]  [place] [address]

## 2024-11-29 DIAGNOSIS — J41.8 MIXED SIMPLE AND MUCOPURULENT CHRONIC BRONCHITIS (HCC): ICD-10-CM

## 2024-11-29 RX ORDER — GUAIFENESIN 600 MG/1
600 TABLET, EXTENDED RELEASE ORAL EVERY 12 HOURS SCHEDULED
Qty: 180 TABLET | Refills: 3 | Status: SHIPPED | OUTPATIENT
Start: 2024-11-29

## 2024-12-02 ENCOUNTER — OFFICE VISIT (OUTPATIENT)
Dept: DERMATOLOGY | Facility: CLINIC | Age: 64
End: 2024-12-02
Payer: MEDICARE

## 2024-12-02 VITALS — WEIGHT: 104 LBS | BODY MASS INDEX: 24.39 KG/M2 | TEMPERATURE: 98 F

## 2024-12-02 DIAGNOSIS — B35.3 TINEA PEDIS OF BOTH FEET: Primary | ICD-10-CM

## 2024-12-02 DIAGNOSIS — L21.9 SEBORRHEIC DERMATITIS: ICD-10-CM

## 2024-12-02 PROCEDURE — 99214 OFFICE O/P EST MOD 30 MIN: CPT | Performed by: REGISTERED NURSE

## 2024-12-02 RX ORDER — PRENATAL VIT 91/IRON/FOLIC/DHA 28-975-200
COMBINATION PACKAGE (EA) ORAL 2 TIMES DAILY
Qty: 42 G | Refills: 5 | Status: SHIPPED | OUTPATIENT
Start: 2024-12-02

## 2024-12-02 RX ORDER — UREA 40 %
CREAM (GRAM) TOPICAL
Qty: 277 G | Refills: 0 | Status: SHIPPED | OUTPATIENT
Start: 2024-12-02

## 2024-12-02 NOTE — PATIENT INSTRUCTIONS
"Physical Exam and Assessment/Plan by Diagnosis:    TINEA PEDIS (\"ATHLETE'S FOOT\")    Assessment and Plan:  Based on a thorough discussion of this condition and the management approach to it (including a comprehensive discussion of the known risks, side effects and potential benefits of treatment), the patient (family) agrees to implement the following specific plan:  Continue to apply terbinafine 1% cream cream twice daily.   Apply urea 40% cream once daily.     Tinea Pedis  Tinea pedis is a fungal infection of the foot and is in fact the most common fungal infection. Tinea pedis is caused by dermatophyte fungi with the three most common being Trichophyton (T.) rubrum, T. interdigitale and Epidermophyton floccosum.    Tinea pedis most commonly involves the interdigital spaces, known as \"athlete's foot.\" Other typical sites include the toenails, groin, and palms of the hands.  There are four major manifestations of tinea pedis including chronic hyperkeratotic, chronic intertriginous, acute ulcerative and vesicobullous. Signs and symptoms include:   Itchiness, redness, and scaling between the toes  Scales covering the soles and sides of the feet  Blisters over the inner aspect of the feet    It is particularly common in hot, tropical, and urban environments where sweating in the feet facilitate fungal growth. Risk factors for development include:  Occlusive footwear  Excessive swearing  Diabetes or other underlying immunosuppression   Poor peripheral circulation     The diagnosis of tinea pedis can usually be made via good history and physical exam due to its characteristic clinical features. Diagnosis can be confirmed by examining skin scrapings under the microscope. Cultures are occasionally done but may not be necessary if fungi are seen under microscopy.     Other diagnoses to consider if patients do not respond to therapy include psoriasis, contact dermatitis, and eczema.    Tinea pedis can be treated with " topical antifungal drugs applied to affected areas on a repeated basis (usually 2 twice a day) for 2 to 4 weeks. Common topical medications include topical ketoconazole, allylamines, butenafine, ciclopirox, and tolnaftate.  In cases that do not respond to topical therapy, oral antifungal agents may be used which include terbinafine, itraconazole, fluconazole and griseofulvin. These oral agents are also used to treat tinea capitis (fungal infection of the scalp) and onychomycosis (fungal infection of the nails).  Those with pre-existing liver problems are usually screened for liver function prior to starting oral terbinafine.     Tinea pedis can be prevented by making sure feet are clean and dry with protective footwear worn in communal facilities. Other recommendations are:  Using drying foot powders when wearing occlusive shoes   Thoroughly dry shoes and boots prior to wearing them   Making sure to clean contaminated bathroom floors with bleach   Treatment of family members and other close contacts     SEBORRHEIC DERMATITIS    Assessment and Plan:  Based on a thorough discussion of this condition and the management approach to it (including a comprehensive discussion of the known risks, side effects and potential benefits of treatment), the patient (family) agrees to implement the following specific plan:           SCALP  Start ketoconazole shampoo 2-3 times per week, lather for 5 minutes prior to rinsing off. For facial involvement, ketoconazole cream can be used daily for maintenance therapy, and should be used a few times a week for suppression when skin is under good control.  Apply the Clobetasol steroid solution 1-2 times daily to dry scalp and leave it on. Use as needed for redness, scale and itching. After 2 weeks of use, stop and reassess, can restart if needed.           FACE  For flares only, apply the steroid cream (hydrocortisone) twice a day for up to 5-7 days at a time as needed.  Discussed AE of  "skin thinning with chronic use. Use this only when you have flares of your rash.  Then, once the rash subsides, use ketoconazole cream daily as a maintenance. This is NOT a steroid, is safe for long-term everyday use. If you use this daily this will keep the rash on your face under control and help prevent flares of the flaking.     Seborrheic Dermatitis   Seborrheic dermatitis is a common, chronic or relapsing form of eczema/dermatitis that mainly affects the sebaceous, gland-rich regions of the scalp, face, and trunk.  There are infantile and adult forms of seborrhoeic dermatitis. It is sometimes associated with psoriasis and, in that clinical scenario, may be referred to as \"sebo-psoriasis.\"  Seborrheic dermatitis is also known as \"seborrheic eczema.\"  Dandruff (also called \"pityriasis capitis\") is an uninflamed form of seborrhoeic dermatitis. Dandruff presents as bran-like scaly patches scattered within hair-bearing areas of the scalp.  In an infant, this condition may be referred to as \"cradle cap.\"  The cause of seborrheic dermatitis is not completely understood. It is associated with proliferation of various species of the skin commensal Malassezia, in its yeast (non-pathogenic) form. Its metabolites (such as the fatty acids oleic acid, malssezin, and indole-3-carbaldehyde) may cause an inflammatory reaction. Differences in skin barrier lipid content and function may account for individual presentations.    Infantile Seborrheic Dermatitis  Infantile seborrheic dermatitis affects babies under the age of 3 months and usually resolves by 6-12 months of age.  Infantile seborrheic dermatitis causes \"cradle cap\" (diffuse, greasy scaling on scalp). The rash may spread to affect armpit and groin folds (a type of \"napkin dermatitis\").  There may be associated salmon-pink colored patches that may flake or peel.  The rash in this case is usually not especially itchy, so the baby often appears undisturbed by the rash, " even when more generalized.    Adult Seborrheic Dermatitis  Adult seborrheic dermatitis tends to begin in late adolescence; prevalence is greatest in young adults and in the elderly. It is more common in males than in females.    The following factors are sometimes associated with severe adult seborrheic dermatitis:  Oily skin  Familial tendency to seborrhoeic dermatitis or a family history of psoriasis  Immunosuppression: organ transplant recipient, human immunodeficiency virus (HIV) infection and patients with lymphoma  Neurological and psychiatric diseases: Parkinson disease, tardive dyskinesia, depression, epilepsy, facial nerve palsy, spinal cord injury and congenital disorders such as Down syndrome  Treatment for psoriasis with psoralen and ultraviolet A (PUVA) therapy  Lack of sleep  Stressful events.    In adults, seborrheic dermatitis may typically affect the scalp, face (creases around the nose, behind ears, within eyebrows) and upper trunk. Typical clinical features include:  Winter flares, improving in summer following sun exposure  Minimal itch most of the time  Combination oily and dry mid-facial skin  Ill-defined localized scaly patches or diffuse scale in the scalp  Blepharitis; scaly red eyelid margins  [place]umet-pink, thin, scaly, and ill-defined plaques in skin folds on both sides of the face  Petal or ring-shaped flaky patches on hair-line and on anterior chest  Rash in armpits, under the breasts, in the groin folds and genital creases  Superficial folliculitis (inflamed hair follicles) on cheeks and upper trunk    Seborrheic dermatitis is diagnosed by its clinical appearance and behavior. Skin biopsy may be helpful but is rarely necessary to make this diagnosis.

## 2024-12-02 NOTE — PROGRESS NOTES
"[facility] Note     Patient Name: Jhony Bruno  Encounter Date: 12/2/2024     Have you been cared for by a [facility] Dermatologist in the last 3 years and, if so, which description applies to you?    Yes.  I have been here within the last 3 years, and my medical history has NOT changed since that time.  I am MALE/not capable of bearing children.    REVIEW OF SYSTEMS:  Have you recently had or currently have any of the following? No changes in my recent health.   PAST MEDICAL HISTORY:  Have you personally ever had or currently have any of the following?  If \"YES,\" then please provide more detail. No changes in my medical history.   HISTORY OF IMMUNOSUPPRESSION: Do you have a history of any of the following:  Systemic Immunosuppression such as Diabetes, Biologic or Immunotherapy, Chemotherapy, Organ Transplantation, Bone Marrow Transplantation or Prednisone?  No     Answering \"YES\" requires the addition of the dotphrase \"IMMUNOSUPPRESSED\" as the first diagnosis of the patient's visit.   FAMILY HISTORY:  Any \"first degree relatives\" (parent, brother, sister, or child) with the following?    No changes in my family's known health.   PATIENT EXPERIENCE:    Do you want the Dermatologist to perform a COMPLETE skin exam today including a clinical examination under the \"bra and underwear\" areas?  NO  If necessary, do we have your permission to call and leave a detailed message on your Preferred Phone number that includes your specific medical information?  Yes      Allergies   Allergen Reactions    Levofloxacin Itching    Codeine Nausea Only     Sister is unsure of actual reaction.    Augmentin [Amoxicillin-Pot Clavulanate] Diarrhea      Current Outpatient Medications:     acetaminophen (TYLENOL) 325 mg tablet, Take 2 tablets (650 mg total) by mouth every 4 (four) hours as needed for moderate pain or fever, Disp: 100 tablet, Rfl: 3    albuterol (2.5 mg/3 mL) 0.083 % nebulizer solution, Take 3 mL (2.5 mg " total) by nebulization 2 (two) times a day as needed for wheezing or shortness of breath (And cough), Disp: 180 mL, Rfl: 1    buPROPion (WELLBUTRIN XL) 150 mg 24 hr tablet, Take 1 tablet (150 mg total) by mouth every morning, Disp: 90 tablet, Rfl: 2    calcium carbonate (TUMS ULTRA) 1000 MG chewable tablet, Chew 1 tablet (1,000 mg total) 2 (two) times a day, Disp: 180 tablet, Rfl: 3    clobetasol (TEMOVATE) 0.05 % external solution, Apply BID for up to 2 weeks to affected skin on scalp prn flares then take one week break and can repeat regimen prn flares. Do not apply to groin, skin folds, face., Disp: 50 mL, Rfl: 4    clotrimazole (LOTRIMIN) 1 % cream, Apply topically 2 (two) times a day as needed (Itchy rash), Disp: 40 g, Rfl: 3    dextromethorphan-guaifenesin (MUCINEX DM)  MG per 12 hr tablet, Take 1 tablet by mouth as needed for cough, Disp: , Rfl:     guaiFENesin (MUCINEX) 600 mg 12 hr tablet, Take 1 tablet (600 mg total) by mouth every 12 (twelve) hours, Disp: 180 tablet, Rfl: 3    hydrocortisone 2.5 % ointment, Apply topically 2 (two) times a day For no longer than 5-7 days to affected skin on the face. Use only for flares, and take 1-2 week break between using., Disp: 30 g, Rfl: 1    ketoconazole (NIZORAL) 2 % cream, Apply topically daily to affected skin on the face when flaring, and 3-4 times per week for maintenance when under good control, Disp: 30 g, Rfl: 10    ketoconazole (NIZORAL) 2 % shampoo, Use as shampoo at least 3 times per week to scalp. Lather into scalp and let sit for 5 minutes before rinsing., Disp: 100 mL, Rfl: 10    loratadine (CLARITIN) 10 mg tablet, Take 1 tablet (10 mg total) by mouth daily as needed for allergies, Disp: 90 tablet, Rfl: 3    metoprolol succinate (TOPROL-XL) 25 mg 24 hr tablet, Take 1 tablet (25 mg total) by mouth daily at bedtime, Disp: 90 tablet, Rfl: 3    mometasone (ELOCON) 0.1 % cream, Apply topically daily as needed (red in face), Disp: , Rfl:     nystatin  "(MYCOSTATIN) powder, Apply topically 2 (two) times a day For 14 days or until rash is gone, Disp: 60 g, Rfl: 1    olopatadine (PATANOL) 0.1 % ophthalmic solution, Administer 1 drop to both eyes 2 (two) times a day as needed for allergies, Disp: 5 mL, Rfl: 5    omeprazole (PriLOSEC) 40 MG capsule, Take 1 capsule (40 mg total) by mouth daily, Disp: 90 capsule, Rfl: 3    polyethylene glycol (MIRALAX) 17 g packet, Take 17 g by mouth daily, Disp: 100 each, Rfl: 3    simvastatin (ZOCOR) 40 mg tablet, Take 1 tablet (40 mg total) by mouth daily at bedtime, Disp: 90 tablet, Rfl: 3    terbinafine (LamISIL) 1 % cream, Apply topically 2 (two) times a day On the feet, in between the toes and on the toe nails, Disp: 42 g, Rfl: 3          Whom besides the patient is providing clinical information about today's encounter?   Parent/Guardian provided history (due to age/developmental stage of patient)    Physical Exam and Assessment/Plan by Diagnosis:    TINEA PEDIS (\"ATHLETE'S FOOT\")    Physical Exam:  Anatomic Location Affected:  feet, between toes, toenails   Morphological Description:  few erythematous scaly plaques  Pertinent Positives:  Pertinent Negatives:    Additional History of Present Condition:  Patent last evaluated on 8/22/2024 by Dr. Guillen. Completed 3 weeks of terbinafine cream twice daily, currently applies cream 1-2 times weekly. Sister states toenails were removed years ago. Reports today that feet look much better and completely clear.     Assessment and Plan:  Based on a thorough discussion of this condition and the management approach to it (including a comprehensive discussion of the known risks, side effects and potential benefits of treatment), the patient (family) agrees to implement the following specific plan:  Continue to apply terbinafine 1% cream cream twice daily.   Apply urea 40% cream once daily.     Tinea Pedis  Tinea pedis is a fungal infection of the foot and is in fact the most common fungal " "infection. Tinea pedis is caused by dermatophyte fungi with the three most common being Trichophyton (T.) rubrum, T. interdigitale and Epidermophyton floccosum.    Tinea pedis most commonly involves the interdigital spaces, known as \"athlete's foot.\" Other typical sites include the toenails, groin, and palms of the hands.  There are four major manifestations of tinea pedis including chronic hyperkeratotic, chronic intertriginous, acute ulcerative and vesicobullous. Signs and symptoms include:   Itchiness, redness, and scaling between the toes  Scales covering the soles and sides of the feet  Blisters over the inner aspect of the feet    It is particularly common in hot, tropical, and urban environments where sweating in the feet facilitate fungal growth. Risk factors for development include:  Occlusive footwear  Excessive swearing  Diabetes or other underlying immunosuppression   Poor peripheral circulation     The diagnosis of tinea pedis can usually be made via good history and physical exam due to its characteristic clinical features. Diagnosis can be confirmed by examining skin scrapings under the microscope. Cultures are occasionally done but may not be necessary if fungi are seen under microscopy.     Other diagnoses to consider if patients do not respond to therapy include psoriasis, contact dermatitis, and eczema.    Tinea pedis can be treated with topical antifungal drugs applied to affected areas on a repeated basis (usually 2 twice a day) for 2 to 4 weeks. Common topical medications include topical ketoconazole, allylamines, butenafine, ciclopirox, and tolnaftate.  In cases that do not respond to topical therapy, oral antifungal agents may be used which include terbinafine, itraconazole, fluconazole and griseofulvin. These oral agents are also used to treat tinea capitis (fungal infection of the scalp) and onychomycosis (fungal infection of the nails).  Those with pre-existing liver problems are usually " screened for liver function prior to starting oral terbinafine.     Tinea pedis can be prevented by making sure feet are clean and dry with protective footwear worn in communal facilities. Other recommendations are:  Using drying foot powders when wearing occlusive shoes   Thoroughly dry shoes and boots prior to wearing them   Making sure to clean contaminated bathroom floors with bleach   Treatment of family members and other close contacts     SEBORRHEIC DERMATITIS    Physical Exam:  Anatomic Location Affected:  face, scalp  Morphological Description:  few erythematous greasy scaly plaques   Pertinent Positives:  Pertinent Negatives:    Additional History of Present Condition:  Patient last evaluated on 8/22/2024 by Dr. Guillen. Has previously tried using over the counter shampoos, ketoconazole shampoo, and mometasone. On scalp patient is currently using ketoconazole shampoo 2-3 times weeks and clobetasol solution for flares. On face patient is currently using ketoconazole cream daily for maintenance and hydrocortisone cream for flares. Sister reports today that face and scalp appear a lot better. Only a little bit of flaking on scalp, patient reports both face and scalp feel much better.     Assessment and Plan:  Based on a thorough discussion of this condition and the management approach to it (including a comprehensive discussion of the known risks, side effects and potential benefits of treatment), the patient (family) agrees to implement the following specific plan:           SCALP  Start ketoconazole shampoo 2-3 times per week, lather for 5 minutes prior to rinsing off. For facial involvement, ketoconazole cream can be used daily for maintenance therapy, and should be used a few times a week for suppression when skin is under good control.  Apply the Clobetasol steroid solution 1-2 times daily to dry scalp and leave it on. Use as needed for redness, scale and itching. After 2 weeks of use, stop and reassess,  "can restart if needed.           FACE  For flares only, apply the steroid cream (hydrocortisone) twice a day for up to 5-7 days at a time as needed.  Discussed AE of skin thinning with chronic use. Use this only when you have flares of your rash.  Then, once the rash subsides, use ketoconazole cream daily as a maintenance. This is NOT a steroid, is safe for long-term everyday use. If you use this daily this will keep the rash on your face under control and help prevent flares of the flaking.     Seborrheic Dermatitis   Seborrheic dermatitis is a common, chronic or relapsing form of eczema/dermatitis that mainly affects the sebaceous, gland-rich regions of the scalp, face, and trunk.  There are infantile and adult forms of seborrhoeic dermatitis. It is sometimes associated with psoriasis and, in that clinical scenario, may be referred to as \"sebo-psoriasis.\"  Seborrheic dermatitis is also known as \"seborrheic eczema.\"  Dandruff (also called \"pityriasis capitis\") is an uninflamed form of seborrhoeic dermatitis. Dandruff presents as bran-like scaly patches scattered within hair-bearing areas of the scalp.  In an infant, this condition may be referred to as \"cradle cap.\"  The cause of seborrheic dermatitis is not completely understood. It is associated with proliferation of various species of the skin commensal Malassezia, in its yeast (non-pathogenic) form. Its metabolites (such as the fatty acids oleic acid, malssezin, and indole-3-carbaldehyde) may cause an inflammatory reaction. Differences in skin barrier lipid content and function may account for individual presentations.    Infantile Seborrheic Dermatitis  Infantile seborrheic dermatitis affects babies under the age of 3 months and usually resolves by 6-12 months of age.  Infantile seborrheic dermatitis causes \"cradle cap\" (diffuse, greasy scaling on scalp). The rash may spread to affect armpit and groin folds (a type of \"napkin dermatitis\").  There may be " associated salmon-pink colored patches that may flake or peel.  The rash in this case is usually not especially itchy, so the baby often appears undisturbed by the rash, even when more generalized.    Adult Seborrheic Dermatitis  Adult seborrheic dermatitis tends to begin in late adolescence; prevalence is greatest in young adults and in the elderly. It is more common in males than in females.    The following factors are sometimes associated with severe adult seborrheic dermatitis:  Oily skin  Familial tendency to seborrhoeic dermatitis or a family history of psoriasis  Immunosuppression: organ transplant recipient, human immunodeficiency virus (HIV) infection and patients with lymphoma  Neurological and psychiatric diseases: Parkinson disease, tardive dyskinesia, depression, epilepsy, facial nerve palsy, spinal cord injury and congenital disorders such as Down syndrome  Treatment for psoriasis with psoralen and ultraviolet A (PUVA) therapy  Lack of sleep  Stressful events.    In adults, seborrheic dermatitis may typically affect the scalp, face (creases around the nose, behind ears, within eyebrows) and upper trunk. Typical clinical features include:  Winter flares, improving in summer following sun exposure  Minimal itch most of the time  Combination oily and dry mid-facial skin  Ill-defined localized scaly patches or diffuse scale in the scalp  Blepharitis; scaly red eyelid margins  [place]-pink, thin, scaly, and ill-defined plaques in skin folds on both sides of the face  Petal or ring-shaped flaky patches on hair-line and on anterior chest  Rash in armpits, under the breasts, in the groin folds and genital creases  Superficial folliculitis (inflamed hair follicles) on cheeks and upper trunk    Seborrheic dermatitis is diagnosed by its clinical appearance and behavior. Skin biopsy may be helpful but is rarely necessary to make this diagnosis.     Scribe Attestation      I,:  Jane Wright MA am acting as a  scribe while in the presence of the attending physician.:       I,:  Jarrod Albarado MD personally performed the services described in this documentation    as scribed in my presence.:

## 2024-12-11 ENCOUNTER — OFFICE VISIT (OUTPATIENT)
Dept: AUDIOLOGY | Age: 64
End: 2024-12-11

## 2024-12-11 DIAGNOSIS — H90.3 SENSORY HEARING LOSS, BILATERAL: Primary | ICD-10-CM

## 2024-12-12 NOTE — PROGRESS NOTES
Hearing Aid Visit:    Name:  Jhony Bruno  :  1960  Age:  64 y.o.  MRN:  [id number]  Date of Evaluation: 24     HISTORY:    Jhony Bruno was seen today (2024) for a(n) in-warranty hearing aid check of his bilateral hearing aids. Today, Jhony noted the right hearing aids feels like it is slipping out throughout the day.    Most recent Audiogram: 24    DEVICE INFORMATION:      Left Device Right Device   Hearing Aid Make: [employer]  Oticon    Hearing Aid Model: REAL 1 Mini RITE-R REAL 1 Mini RITE-R   Serial Number: [id number]   Repair Warranty Date: 27   Loss/Damage Warranty Status: Active  Active        Length/Output    Wax System: Pro Wax miniFIT Pro Wax miniFIT   Dome Size/Style:  8DB 8DB   Battery: Lithium-ion Rechargeable Lithium-ion Rechargeable      Earmold Serial Number: N/A N/A   Earmold Warranty Date:  N/A N/A    Serial Number: [id number]   Warranty Date: 27    Accessories: N/A       ACTION/ADJUSTMENTS:    Earmold impression(s) completed without incident with good parting otoscopy noted.       RECOMMENDATIONS:     Will call when earmolds arrive. Will need HAV (45min).      Jory Lockhart, CCC-A  Clinical Audiologist  [facility]  [address]  [place] [address]

## 2024-12-27 ENCOUNTER — OFFICE VISIT (OUTPATIENT)
Dept: FAMILY MEDICINE CLINIC | Facility: CLINIC | Age: 64
End: 2024-12-27
Payer: MEDICARE

## 2024-12-27 VITALS
DIASTOLIC BLOOD PRESSURE: 82 MMHG | WEIGHT: 104 LBS | BODY MASS INDEX: 20.42 KG/M2 | HEIGHT: 60 IN | OXYGEN SATURATION: 100 % | SYSTOLIC BLOOD PRESSURE: 116 MMHG | RESPIRATION RATE: 14 BRPM | TEMPERATURE: 97.5 F | HEART RATE: 79 BPM

## 2024-12-27 DIAGNOSIS — B37.2 CANDIDIASIS, INTERTRIGO: Primary | ICD-10-CM

## 2024-12-27 PROCEDURE — 99213 OFFICE O/P EST LOW 20 MIN: CPT | Performed by: FAMILY MEDICINE

## 2024-12-27 PROCEDURE — G2211 COMPLEX E/M VISIT ADD ON: HCPCS | Performed by: FAMILY MEDICINE

## 2024-12-27 NOTE — PROGRESS NOTES
"Assessment/Plan:    Candidiasis, intertrigo   - advised to keep the area clean and dry and apply ketoconazole 2% cream twice a day until healed, the star nystatin powder 2-3 times a week for prevention, encouraged to contact the office for persistent or worsening symptoms. Caregiver understands and agrees with the treatment plan.      Subjective:   Chief Complaint   Patient presents with    Rash     Under fold of belly  Noticed yesterday, thinks its been there for awhile        Patient ID: Jhony Bruno is a 64 y.o. male who presents today with his caregiver who noticed erythematous rash under the skin fold in his lower abdomen yesterday. Patient denies any pain or itching.     HPI    The following portions of the patient's history were reviewed and updated as appropriate: allergies, current medications, past family history, past medical history, past social history, past surgical history and problem list.    Past Medical History:   Diagnosis Date    Anxiety 11/15/2016    Last Assessment & Plan:   Formatting of this note might be different from the original.  Agree with attempt to taper. Take zoloft 50mg every other day for a week, every 3rd day the following week. Then, discontinue it.      Depression     Dysphagia 06/23/2023    GERD (gastroesophageal reflux disease)     Hypoxia 07/03/2023    Inhalation of liquid or vomitus, lower respiratory tract 07/03/2023    Intellectual disability     Reactive depression 06/23/2023    Scoliosis, unspecified scoliosis type, unspecified spinal region      Past Surgical History:   Procedure Laterality Date    APPENDECTOMY      CHOLECYSTECTOMY      HERNIA REPAIR      PANCREAS SURGERY N/A     Removal of a mass perhaps 2018    PROSTATE SURGERY N/A     \"Laser prostate surgery \"     Family History   Problem Relation Age of Onset    Heart disease Mother     Parkinsonism Mother     Heart disease Father     Alcohol abuse Neg Hx     Substance Abuse Neg Hx     Mental illness Neg Hx  "     Social History     Socioeconomic History    Marital status: Single     Spouse name: Not on file    Number of children: 0    Years of education: Not on file    Highest education level: Not on file   Occupational History    Not on file   Tobacco Use    Smoking status: Never     Passive exposure: Never    Smokeless tobacco: Never   Vaping Use    Vaping status: Never Used   Substance and Sexual Activity    Alcohol use: Not Currently    Drug use: Never    Sexual activity: Not on file   Other Topics Concern    Not on file   Social History Narrative    Not on file     Social Drivers of Health     Financial Resource Strain: Low Risk  (8/10/2023)    Overall Financial Resource Strain (CARDIA)     Difficulty of Paying Living Expenses: Not very hard   Food Insecurity: No Food Insecurity (9/12/2024)    Nursing - Inadequate Food Risk Classification     Worried About Running Out of Food in the Last Year: Never true     Ran Out of Food in the Last Year: Never true     Ran Out of Food in the Last Year: Not on file   Transportation Needs: No Transportation Needs (9/12/2024)    PRAPARE - Transportation     Lack of Transportation (Medical): No     Lack of Transportation (Non-Medical): No   Physical Activity: Not on file   Stress: Not on file   Social Connections: Not on file   Intimate Partner Violence: Not on file   Housing Stability: High Risk (9/12/2024)    Housing Stability Vital Sign     Unable to Pay for Housing in the Last Year: No     Number of Times Moved in the Last Year: 2     Homeless in the Last Year: No       Current Outpatient Medications:     acetaminophen (TYLENOL) 325 mg tablet, Take 2 tablets (650 mg total) by mouth every 4 (four) hours as needed for moderate pain or fever, Disp: 100 tablet, Rfl: 3    albuterol (2.5 mg/3 mL) 0.083 % nebulizer solution, Take 3 mL (2.5 mg total) by nebulization 2 (two) times a day as needed for wheezing or shortness of breath (And cough), Disp: 180 mL, Rfl: 1    buPROPion  (WELLBUTRIN XL) 150 mg 24 hr tablet, Take 1 tablet (150 mg total) by mouth every morning, Disp: 90 tablet, Rfl: 2    calcium carbonate (TUMS ULTRA) 1000 MG chewable tablet, Chew 1 tablet (1,000 mg total) 2 (two) times a day, Disp: 180 tablet, Rfl: 3    clobetasol (TEMOVATE) 0.05 % external solution, Apply BID for up to 2 weeks to affected skin on scalp prn flares then take one week break and can repeat regimen prn flares. Do not apply to groin, skin folds, face., Disp: 50 mL, Rfl: 4    clotrimazole (LOTRIMIN) 1 % cream, Apply topically 2 (two) times a day as needed (Itchy rash), Disp: 40 g, Rfl: 3    dextromethorphan-guaifenesin (MUCINEX DM)  MG per 12 hr tablet, Take 1 tablet by mouth as needed for cough, Disp: , Rfl:     guaiFENesin (MUCINEX) 600 mg 12 hr tablet, Take 1 tablet (600 mg total) by mouth every 12 (twelve) hours, Disp: 180 tablet, Rfl: 3    hydrocortisone 2.5 % ointment, Apply topically 2 (two) times a day For no longer than 5-7 days to affected skin on the face. Use only for flares, and take 1-2 week break between using., Disp: 30 g, Rfl: 1    ketoconazole (NIZORAL) 2 % cream, Apply topically daily to affected skin on the face when flaring, and 3-4 times per week for maintenance when under good control, Disp: 30 g, Rfl: 10    ketoconazole (NIZORAL) 2 % shampoo, Use as shampoo at least 3 times per week to scalp. Lather into scalp and let sit for 5 minutes before rinsing., Disp: 100 mL, Rfl: 10    loratadine (CLARITIN) 10 mg tablet, Take 1 tablet (10 mg total) by mouth daily as needed for allergies, Disp: 90 tablet, Rfl: 3    metoprolol succinate (TOPROL-XL) 25 mg 24 hr tablet, Take 1 tablet (25 mg total) by mouth daily at bedtime, Disp: 90 tablet, Rfl: 3    mometasone (ELOCON) 0.1 % cream, Apply topically daily as needed (red in face), Disp: , Rfl:     nystatin (MYCOSTATIN) powder, Apply topically 2 (two) times a day For 14 days or until rash is gone, Disp: 60 g, Rfl: 1    olopatadine (PATANOL)  "0.1 % ophthalmic solution, Administer 1 drop to both eyes 2 (two) times a day as needed for allergies, Disp: 5 mL, Rfl: 5    omeprazole (PriLOSEC) 40 MG capsule, Take 1 capsule (40 mg total) by mouth daily, Disp: 90 capsule, Rfl: 3    polyethylene glycol (MIRALAX) 17 g packet, Take 17 g by mouth daily, Disp: 100 each, Rfl: 3    simvastatin (ZOCOR) 40 mg tablet, Take 1 tablet (40 mg total) by mouth daily at bedtime, Disp: 90 tablet, Rfl: 3    terbinafine (LamISIL) 1 % cream, Apply topically 2 (two) times a day On the feet, in between the toes and on the toe nails, Disp: 42 g, Rfl: 5    urea (CARMOL) 40 %, Use once a day to affected area with thicker skin until tube is finished., Disp: 277 g, Rfl: 0    Review of Systems   Constitutional:  Negative for activity change, fatigue and fever.   Skin:  Positive for rash.           Objective:    Vitals:    12/27/24 1212   BP: 116/82   Pulse: 79   Resp: 14   Temp: 97.5 °F (36.4 °C)   SpO2: 100%   Weight: 47.2 kg (104 lb)   Height: 4' 6.75\" (1.391 m)        Physical Exam  Constitutional:       General: He is not in acute distress.  Musculoskeletal:      Comments: Erythematous patches with maceration under lower abdomen skin fold   Neurological:      Mental Status: He is alert.   Psychiatric:         Mood and Affect: Mood normal.         Behavior: Behavior normal.          "

## 2025-01-15 ENCOUNTER — OFFICE VISIT (OUTPATIENT)
Dept: AUDIOLOGY | Age: 65
End: 2025-01-15
Payer: COMMERCIAL

## 2025-01-15 DIAGNOSIS — H90.3 SENSORY HEARING LOSS, BILATERAL: Primary | ICD-10-CM

## 2025-01-15 NOTE — PROGRESS NOTES
Hearing Aid Visit:    Name:  Jhony Bruno  :  1960  Age:  64 y.o.  MRN:  [id number]  Date of Evaluation: 01/15/25     HISTORY:    Jhony Bruno was seen today (1/15/2025) for a(n) in-warranty hearing aid check of his bilateral hearing aids. Today, Jhony was seen to  new earmolds.    Most recent Audiogram: 24    DEVICE INFORMATION:       Left Device Right Device   Hearing Aid Make: [employer]  [employer]    Hearing Aid Model: REAL 1 Mini RITE-R REAL 1 Mini RITE-R   Serial Number: [id number]   Repair Warranty Date: 27   Loss/Damage Warranty Status: Active  Active         Length/Output    Wax System: Pro Wax miniFIT Pro Wax miniFIT   Dome Size/Style:  8DB 8DB   Battery: Lithium-ion Rechargeable Lithium-ion Rechargeable       Earmold Serial Number: [id number]   Earmold Warranty Date:  25    Serial Number: [id number]   Warranty Date: 27    Accessories: N/A     ACTION/ADJUSTMENTS:    Fit with new earmolds. Patient was happy with the fit of the earmolds. Patient was fit with new otoclip. Paid $18.00 today.    RECOMMENDATIONS:     Follow up PRN.      Jory Lockhart, CCC-A  Clinical Audiologist  [facility]  [facility] [place] [id number]

## 2025-01-22 ENCOUNTER — OFFICE VISIT (OUTPATIENT)
Dept: AUDIOLOGY | Age: 65
End: 2025-01-22

## 2025-01-22 DIAGNOSIS — H90.3 SENSORY HEARING LOSS, BILATERAL: Primary | ICD-10-CM

## 2025-01-22 NOTE — PROGRESS NOTES
Hearing Aid Visit:    Name:  Jhony Bruno  :  1960  Age:  64 y.o.  MRN:  [id number]  Date of Evaluation: 25     HISTORY:    Jhony Bruno was seen today (2025) for a(n) in-warranty hearing aid check of his bilateral hearing aids. Today, Jhony noted issues with earmold insertion.    Most recent Audiogram: 24    DEVICE INFORMATION:       Left Device Right Device   Hearing Aid Make: [employer]  [employer]    Hearing Aid Model: REAL 1 Mini RITE-R REAL 1 Mini RITE-R   Serial Number: [id number]   Repair Warranty Date: 27   Loss/Damage Warranty Status: Active  Active         Length/Output    Wax System: Pro Wax miniFIT Pro Wax miniFIT   Dome Size/Style:  8DB 8DB   Battery: Lithium-ion Rechargeable Lithium-ion Rechargeable       Earmold Serial Number: [id number]   Earmold Warranty Date:  25    Serial Number: [id number]   Warranty Date: 27    Accessories: N/A       ACTION/ADJUSTMENTS:    Modified the helix of the earmolds bilaterally. Patient was happy with fit and feel of earmolds.     RECOMMENDATIONS:      Follow up PRN.      Jory Lockhart, CCC-A  Clinical Audiologist  [facility]  [address] [place] [address]

## 2025-02-11 ENCOUNTER — APPOINTMENT (OUTPATIENT)
Dept: LAB | Facility: CLINIC | Age: 65
End: 2025-02-11
Payer: MEDICARE

## 2025-02-11 DIAGNOSIS — E55.9 VITAMIN D DEFICIENCY: ICD-10-CM

## 2025-02-11 DIAGNOSIS — E78.2 MIXED HYPERLIPIDEMIA: ICD-10-CM

## 2025-02-11 DIAGNOSIS — I10 PRIMARY HYPERTENSION: ICD-10-CM

## 2025-02-11 LAB
25(OH)D3 SERPL-MCNC: 20.6 NG/ML (ref 30–100)
ALBUMIN SERPL BCG-MCNC: 4.3 G/DL (ref 3.5–5)
ALP SERPL-CCNC: 105 U/L (ref 34–104)
ALT SERPL W P-5'-P-CCNC: 22 U/L (ref 7–52)
AMORPH URATE CRY URNS QL MICRO: ABNORMAL
ANION GAP SERPL CALCULATED.3IONS-SCNC: 10 MMOL/L (ref 4–13)
AST SERPL W P-5'-P-CCNC: 19 U/L (ref 13–39)
BACTERIA UR QL AUTO: ABNORMAL /HPF
BASOPHILS # BLD AUTO: 0.07 THOUSANDS/ΜL (ref 0–0.1)
BASOPHILS NFR BLD AUTO: 1 % (ref 0–1)
BILIRUB SERPL-MCNC: 0.63 MG/DL (ref 0.2–1)
BILIRUB UR QL STRIP: NEGATIVE
BUN SERPL-MCNC: 8 MG/DL (ref 5–25)
CALCIUM SERPL-MCNC: 9.6 MG/DL (ref 8.4–10.2)
CHLORIDE SERPL-SCNC: 105 MMOL/L (ref 96–108)
CHOLEST SERPL-MCNC: 124 MG/DL (ref ?–200)
CLARITY UR: CLEAR
CO2 SERPL-SCNC: 29 MMOL/L (ref 21–32)
COLOR UR: ABNORMAL
CREAT SERPL-MCNC: 0.77 MG/DL (ref 0.6–1.3)
EOSINOPHIL # BLD AUTO: 0.47 THOUSAND/ΜL (ref 0–0.61)
EOSINOPHIL NFR BLD AUTO: 6 % (ref 0–6)
ERYTHROCYTE [DISTWIDTH] IN BLOOD BY AUTOMATED COUNT: 12.7 % (ref 11.6–15.1)
GFR SERPL CREATININE-BSD FRML MDRD: 96 ML/MIN/1.73SQ M
GLUCOSE P FAST SERPL-MCNC: 74 MG/DL (ref 65–99)
GLUCOSE UR STRIP-MCNC: NEGATIVE MG/DL
HCT VFR BLD AUTO: 46.3 % (ref 36.5–49.3)
HDLC SERPL-MCNC: 38 MG/DL
HGB BLD-MCNC: 15.2 G/DL (ref 12–17)
HGB UR QL STRIP.AUTO: NEGATIVE
IMM GRANULOCYTES # BLD AUTO: 0.03 THOUSAND/UL (ref 0–0.2)
IMM GRANULOCYTES NFR BLD AUTO: 0 % (ref 0–2)
KETONES UR STRIP-MCNC: NEGATIVE MG/DL
LDLC SERPL CALC-MCNC: 68 MG/DL (ref 0–100)
LEUKOCYTE ESTERASE UR QL STRIP: NEGATIVE
LYMPHOCYTES # BLD AUTO: 1.86 THOUSANDS/ΜL (ref 0.6–4.47)
LYMPHOCYTES NFR BLD AUTO: 23 % (ref 14–44)
MCH RBC QN AUTO: 31 PG (ref 26.8–34.3)
MCHC RBC AUTO-ENTMCNC: 32.8 G/DL (ref 31.4–37.4)
MCV RBC AUTO: 95 FL (ref 82–98)
MONOCYTES # BLD AUTO: 0.74 THOUSAND/ΜL (ref 0.17–1.22)
MONOCYTES NFR BLD AUTO: 9 % (ref 4–12)
MUCOUS THREADS UR QL AUTO: ABNORMAL
NEUTROPHILS # BLD AUTO: 4.94 THOUSANDS/ΜL (ref 1.85–7.62)
NEUTS SEG NFR BLD AUTO: 61 % (ref 43–75)
NITRITE UR QL STRIP: NEGATIVE
NON-SQ EPI CELLS URNS QL MICRO: ABNORMAL /HPF
NONHDLC SERPL-MCNC: 86 MG/DL
NRBC BLD AUTO-RTO: 0 /100 WBCS
PH UR STRIP.AUTO: 6.5 [PH]
PLATELET # BLD AUTO: 221 THOUSANDS/UL (ref 149–390)
PMV BLD AUTO: 11 FL (ref 8.9–12.7)
POTASSIUM SERPL-SCNC: 3.7 MMOL/L (ref 3.5–5.3)
PROT SERPL-MCNC: 6.3 G/DL (ref 6.4–8.4)
PROT UR STRIP-MCNC: ABNORMAL MG/DL
RBC # BLD AUTO: 4.9 MILLION/UL (ref 3.88–5.62)
RBC #/AREA URNS AUTO: ABNORMAL /HPF
SODIUM SERPL-SCNC: 144 MMOL/L (ref 135–147)
SP GR UR STRIP.AUTO: 1.01 (ref 1–1.03)
TRIGL SERPL-MCNC: 90 MG/DL (ref ?–150)
TSH SERPL DL<=0.05 MIU/L-ACNC: 3.77 UIU/ML (ref 0.45–4.5)
UROBILINOGEN UR STRIP-ACNC: <2 MG/DL
WBC # BLD AUTO: 8.11 THOUSAND/UL (ref 4.31–10.16)
WBC #/AREA URNS AUTO: ABNORMAL /HPF

## 2025-02-11 PROCEDURE — 80061 LIPID PANEL: CPT

## 2025-02-11 PROCEDURE — 36415 COLL VENOUS BLD VENIPUNCTURE: CPT

## 2025-02-11 PROCEDURE — 82306 VITAMIN D 25 HYDROXY: CPT

## 2025-02-11 PROCEDURE — 81001 URINALYSIS AUTO W/SCOPE: CPT

## 2025-02-11 PROCEDURE — 85025 COMPLETE CBC W/AUTO DIFF WBC: CPT

## 2025-02-11 PROCEDURE — 84443 ASSAY THYROID STIM HORMONE: CPT

## 2025-02-11 PROCEDURE — 80053 COMPREHEN METABOLIC PANEL: CPT

## 2025-02-13 ENCOUNTER — OFFICE VISIT (OUTPATIENT)
Dept: FAMILY MEDICINE CLINIC | Facility: CLINIC | Age: 65
End: 2025-02-13
Payer: MEDICARE

## 2025-02-13 VITALS
WEIGHT: 105.1 LBS | OXYGEN SATURATION: 98 % | BODY MASS INDEX: 20.63 KG/M2 | HEART RATE: 100 BPM | TEMPERATURE: 97.8 F | RESPIRATION RATE: 18 BRPM | DIASTOLIC BLOOD PRESSURE: 74 MMHG | HEIGHT: 60 IN | SYSTOLIC BLOOD PRESSURE: 120 MMHG

## 2025-02-13 DIAGNOSIS — J30.89 NON-SEASONAL ALLERGIC RHINITIS DUE TO OTHER ALLERGIC TRIGGER: ICD-10-CM

## 2025-02-13 DIAGNOSIS — E55.9 VITAMIN D DEFICIENCY: ICD-10-CM

## 2025-02-13 DIAGNOSIS — N40.0 BENIGN PROSTATIC HYPERPLASIA WITHOUT LOWER URINARY TRACT SYMPTOMS: ICD-10-CM

## 2025-02-13 DIAGNOSIS — Q93.89 JACOBSEN SYNDROME: ICD-10-CM

## 2025-02-13 DIAGNOSIS — K21.9 GASTROESOPHAGEAL REFLUX DISEASE, UNSPECIFIED WHETHER ESOPHAGITIS PRESENT: ICD-10-CM

## 2025-02-13 DIAGNOSIS — E78.2 MIXED HYPERLIPIDEMIA: ICD-10-CM

## 2025-02-13 DIAGNOSIS — H90.3 SENSORINEURAL HEARING LOSS (SNHL) OF BOTH EARS: ICD-10-CM

## 2025-02-13 DIAGNOSIS — F33.42 RECURRENT MAJOR DEPRESSIVE DISORDER, IN FULL REMISSION (HCC): ICD-10-CM

## 2025-02-13 DIAGNOSIS — R32 URINARY INCONTINENCE, UNSPECIFIED TYPE: ICD-10-CM

## 2025-02-13 DIAGNOSIS — M41.9 KYPHOSCOLIOSIS AND SCOLIOSIS: ICD-10-CM

## 2025-02-13 DIAGNOSIS — G25.81 RESTLESS LEG: ICD-10-CM

## 2025-02-13 DIAGNOSIS — L82.1 SEBORRHEIC KERATOSES: ICD-10-CM

## 2025-02-13 DIAGNOSIS — I10 PRIMARY HYPERTENSION: Primary | ICD-10-CM

## 2025-02-13 DIAGNOSIS — F79 INTELLECTUAL DISABILITY: ICD-10-CM

## 2025-02-13 DIAGNOSIS — D3A.8 NEUROENDOCRINE TUMOR OF PANCREAS: ICD-10-CM

## 2025-02-13 PROCEDURE — 99214 OFFICE O/P EST MOD 30 MIN: CPT | Performed by: FAMILY MEDICINE

## 2025-02-13 PROCEDURE — G2211 COMPLEX E/M VISIT ADD ON: HCPCS | Performed by: FAMILY MEDICINE

## 2025-02-13 PROCEDURE — 93000 ELECTROCARDIOGRAM COMPLETE: CPT | Performed by: FAMILY MEDICINE

## 2025-02-13 RX ORDER — CHOLECALCIFEROL (VITAMIN D3) 125 MCG
TABLET ORAL
Start: 2025-02-13 | End: 2025-02-17 | Stop reason: SDUPTHER

## 2025-02-13 NOTE — ASSESSMENT & PLAN NOTE
This explains his kyphoscoliosis, speech disorder and intellectual disability and history of disorder

## 2025-02-13 NOTE — ASSESSMENT & PLAN NOTE
To begin vitamin D 2000 units daily and recheck in 1 year  Orders:    Ergocalciferol (Vitamin D2) 50 MCG (2000 UT) TABS; 1 daily

## 2025-02-13 NOTE — ASSESSMENT & PLAN NOTE
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

## 2025-02-13 NOTE — PROGRESS NOTES
Name: Jhony Bruno      : 1960      MRN: [id number]  Encounter Provider: Laura Christopher DO  Encounter Date: 2025   Encounter department: 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  This explains his kyphoscoliosis, speech disorder and intellectual disability and history of disorder       Kevin Syndrome  This explains his kyphoscoliosis, speech disorder and intellectual disability and history of disorder       Mixed hyperlipidemia  On simvastatin  Cholesterol excellent 124 with a LDL of 68       Neuroendocrine tumor of pancreas  Resected at least 5 years ago with no mention of follow-up ever made again       Primary hypertension  Continue Toprol blood pressure excellent 124/80  Orders:    POCT ECG    Recurrent major depressive disorder, in full remission (HCC)  Dizziness with short acting Wellbutrin, totally good with long-acting Wellbutrin         Sensorineural hearing loss (SNHL) of both ears  Just had bilateral hearing aids and is doing well with them       Urinary incontinence, unspecified type  Only managed with pull-ups       Restless leg  Tums every night has made it go away       Kyphoscoliosis and scoliosis  Causing restrictive lung disease with decreased volumes  History of aspiration but clear today  To continue guaifenesin twice daily       Vitamin D deficiency  To begin vitamin D 2000 units daily and recheck in 1 year  Orders:    Ergocalciferol (Vitamin D2) 50 MCG (2000) TABS; 1 daily    Seborrheic keratoses  To use mometasone cream twice daily when he has outbreaks of his face      "    Non-seasonal allergic rhinitis due to other allergic trigger  Continue Claritin as needed              History of Present Illness   HPI  Review of Systems  Negative    Objective   /74   Pulse 100   Temp 97.8 °F (36.6 °C)   Resp 18   Ht 4' 6.75\" (1.391 m)   Wt 47.7 kg (105 lb 1.6 oz)   SpO2 98%   BMI 24.65 kg/m²      Physical Exam who PE done today    Gen.  No acute distress well-appearing well-nourished appears younger than stated age    Mental status  Intellectual disability with difficult to understand speech secondary to Kevin syndrome.  Patient is happy conversant and cooperative    HEENT  PERRLA 3 mm, EOMI without nystagmus, normocephalic atraumatic without facial weakness    Neck   supple no masses trachea midline positive click normal carotid upstrokes with no bruits    Cor  Regular rhythm without ectopy or murmur, no S3-S4, normal palpation that is no heave lift or thrill    Vascular  No edema, good pedal pulses    Lungs  Gross kyphoscoliosis of his lungs  CTA bilaterally in no respiratory distress no wheezes rhonchi or rales, normal to palpation no tactile fremitus    Abdomen  Distended, no palpable masses, no hepatosplenomegaly, normal bowel sounds, nontender    Lymphatics  No palpable nodes in the neck, supraclavicular area, axilla, or groin    Musculoskeletal  No clubbing cyanosis or edema muscle tone normal    Skin  Grisi rash with flakes on his eyebrows and his nasolabial folds    Neuro  Normal ambulation, cranial nerves 2-12 grossly intact, higher functioning with reasoning compromise.    "

## 2025-02-13 NOTE — PATIENT INSTRUCTIONS
Please have PSA done on or after 2025-03-07    Please limit ice cream to once or twice a week and only after patient has protein and vegetables in his diet that day  His labs show that his protein is decreasing and this will increase his constipation and other problems    Please add vitamin D 2000 units daily because he has a vitamin D deficiency    Please have patient walk daily because he is very distended from gas and no walking    Recheck in 6 months or sooner if needed

## 2025-02-13 NOTE — ASSESSMENT & PLAN NOTE
Causing restrictive lung disease with decreased volumes  History of aspiration but clear today  To continue guaifenesin twice daily

## 2025-02-14 ENCOUNTER — TELEPHONE (OUTPATIENT)
Dept: FAMILY MEDICINE CLINIC | Facility: CLINIC | Age: 65
End: 2025-02-14

## 2025-02-14 NOTE — TELEPHONE ENCOUNTER
Patient was in the other day but the form that was filled out was not complete.    Patient's carer dropped the incomplete form off for you to complete.    Please call Sandro at [phone] as soon as form is complete.  It is overdue.

## 2025-02-17 DIAGNOSIS — E55.9 VITAMIN D DEFICIENCY: ICD-10-CM

## 2025-02-17 RX ORDER — CHOLECALCIFEROL (VITAMIN D3) 125 MCG
TABLET ORAL
Qty: 100 TABLET | Refills: 3 | Status: SHIPPED | OUTPATIENT
Start: 2025-02-17

## 2025-02-17 NOTE — TELEPHONE ENCOUNTER
Lesvia Burns -[facility] nursing   Calling - there was a prescription from last appt that didn't go to pharmacy.  Can someone check on that. Ergocalciferol (Vitamin D2) 50 MCG (2000 UT) TABS   [facility] [employer] [facility] [place]    Sending a fax over also about something else about a form that needs to be filled out.  Gave fax#.    Also Dr Christopher wrote a couple recommendations about dietary that she said that can't follow that.  It's against there rights in the agency she said.  She doesn't know if the doctor would like to write something a little differently in regard to those suggestions about following a high fiber diet, no ice cream, eating protein and vegetables and that he needs protein at every meal.

## 2025-02-20 ENCOUNTER — OFFICE VISIT (OUTPATIENT)
Dept: AUDIOLOGY | Age: 65
End: 2025-02-20

## 2025-02-20 DIAGNOSIS — H90.3 SENSORY HEARING LOSS, BILATERAL: Primary | ICD-10-CM

## 2025-02-20 NOTE — TELEPHONE ENCOUNTER
Sandro had called in and was wanting to know the status on the form and if it was filled out.     Please advise out to him at [phone] in regards to the dieting.

## 2025-04-30 NOTE — PROCEDURE: EXCISION
No fever/chills, No photophobia/eye pain/changes in vision, No ear pain/sore throat/dysphagia, No chest pain/palpitations, no SOB/cough/wheeze/stridor, No abdominal pain, No N/V/D, no dysuria/frequency/discharge, No neck/+back pain, no rash, no changes in neurological status/function. Transposition Flap Text: The defect edges were debeveled with a #15 scalpel blade.  Given the location of the defect and the proximity to free margins a transposition flap was deemed most appropriate.  Using a sterile surgical marker, an appropriate transposition flap was drawn incorporating the defect.    The area thus outlined was incised deep to adipose tissue with a #15 scalpel blade.  The skin margins were undermined to an appropriate distance in all directions utilizing iris scissors.

## 2025-05-29 DIAGNOSIS — J18.9 PNEUMONIA: ICD-10-CM

## 2025-05-29 DIAGNOSIS — G25.81 RESTLESS LEG: ICD-10-CM

## 2025-05-29 DIAGNOSIS — E78.2 MIXED HYPERLIPIDEMIA: ICD-10-CM

## 2025-05-29 DIAGNOSIS — K59.00 CONSTIPATION, UNSPECIFIED CONSTIPATION TYPE: ICD-10-CM

## 2025-05-29 DIAGNOSIS — N48.89 PENILE IRRITATION: ICD-10-CM

## 2025-05-29 DIAGNOSIS — J41.8 MIXED SIMPLE AND MUCOPURULENT CHRONIC BRONCHITIS (HCC): ICD-10-CM

## 2025-05-29 DIAGNOSIS — K21.9 GASTROESOPHAGEAL REFLUX DISEASE, UNSPECIFIED WHETHER ESOPHAGITIS PRESENT: ICD-10-CM

## 2025-05-29 DIAGNOSIS — J30.1 SEASONAL ALLERGIC RHINITIS DUE TO POLLEN: ICD-10-CM

## 2025-05-29 DIAGNOSIS — R00.0 TACHYCARDIA: ICD-10-CM

## 2025-05-29 DIAGNOSIS — E55.9 VITAMIN D DEFICIENCY: Primary | ICD-10-CM

## 2025-05-29 DIAGNOSIS — L21.9 SEBORRHEIC DERMATITIS: ICD-10-CM

## 2025-05-30 RX ORDER — CLOTRIMAZOLE 1 %
CREAM (GRAM) TOPICAL
Qty: 30 G | Refills: 3 | Status: SHIPPED | OUTPATIENT
Start: 2025-05-30

## 2025-05-30 RX ORDER — KETOCONAZOLE 20 MG/ML
SHAMPOO, SUSPENSION TOPICAL
Qty: 120 ML | Refills: 11 | Status: SHIPPED | OUTPATIENT
Start: 2025-05-30

## 2025-05-30 RX ORDER — LORATADINE 10 MG/1
TABLET ORAL
Qty: 30 TABLET | Refills: 3 | Status: SHIPPED | OUTPATIENT
Start: 2025-05-30

## 2025-05-30 RX ORDER — POLYETHYLENE GLYCOL 3350 17 G/17G
POWDER ORAL
Qty: 510 G | Refills: 3 | Status: SHIPPED | OUTPATIENT
Start: 2025-05-30

## 2025-05-30 RX ORDER — IPRATROPIUM BROMIDE AND ALBUTEROL SULFATE 2.5; .5 MG/3ML; MG/3ML
SOLUTION RESPIRATORY (INHALATION)
Qty: 90 ML | Refills: 5 | OUTPATIENT
Start: 2025-05-30

## 2025-05-30 RX ORDER — SIMVASTATIN 40 MG
TABLET ORAL
Qty: 30 TABLET | Refills: 3 | Status: SHIPPED | OUTPATIENT
Start: 2025-05-30

## 2025-05-30 RX ORDER — OLOPATADINE HYDROCHLORIDE 1 MG/ML
SOLUTION OPHTHALMIC
Qty: 5 ML | Refills: 5 | Status: SHIPPED | OUTPATIENT
Start: 2025-05-30

## 2025-05-30 RX ORDER — METOPROLOL SUCCINATE 25 MG/1
TABLET, EXTENDED RELEASE ORAL
Qty: 30 TABLET | Refills: 3 | Status: SHIPPED | OUTPATIENT
Start: 2025-05-30

## 2025-05-30 RX ORDER — GUAIFENESIN 600 MG/1
TABLET, EXTENDED RELEASE ORAL
Qty: 60 TABLET | Refills: 3 | Status: SHIPPED | OUTPATIENT
Start: 2025-05-30

## 2025-05-30 RX ORDER — KETOCONAZOLE 20 MG/G
CREAM TOPICAL
Qty: 30 G | Refills: 11 | Status: SHIPPED | OUTPATIENT
Start: 2025-05-30

## 2025-05-30 RX ORDER — ALBUTEROL SULFATE 0.83 MG/ML
2.5 SOLUTION RESPIRATORY (INHALATION) EVERY 6 HOURS PRN
Qty: 90 ML | Refills: 1 | Status: SHIPPED | OUTPATIENT
Start: 2025-05-30

## 2025-05-30 RX ORDER — ACETAMINOPHEN 325 MG/1
TABLET ORAL
Qty: 60 TABLET | Refills: 1 | Status: SHIPPED | OUTPATIENT
Start: 2025-05-30

## 2025-05-30 RX ORDER — OMEPRAZOLE 40 MG/1
CAPSULE, DELAYED RELEASE ORAL
Qty: 30 CAPSULE | Refills: 3 | Status: SHIPPED | OUTPATIENT
Start: 2025-05-30

## 2025-05-30 RX ORDER — CHOLECALCIFEROL (VITAMIN D3) 50 MCG
TABLET ORAL
Qty: 30 TABLET | Refills: 5 | Status: SHIPPED | OUTPATIENT
Start: 2025-05-30

## 2025-05-30 RX ORDER — BUDESONIDE 0.5 MG/2ML
INHALANT ORAL
Qty: 60 ML | Refills: 5 | OUTPATIENT
Start: 2025-05-30

## 2025-05-30 NOTE — TELEPHONE ENCOUNTER
Pt was seen 12/20/24       SCALP  Start ketoconazole shampoo 2-3 times per week, lather for 5 minutes prior to rinsing off. For facial involvement, ketoconazole cream can be used daily for maintenance therapy, and should be used a few times a week for suppression when skin is under good control.  Apply the Clobetasol steroid solution 1-2 times daily to dry scalp and leave it on. Use as needed for redness, scale and itching. After 2 weeks of use, stop and reassess, can restart if needed.            FACE  For flares only, apply the steroid cream (hydrocortisone) twice a day for up to 5-7 days at a time as needed.  Discussed AE of skin thinning with chronic use. Use this only when you have flares of your rash.  Then, once the rash subsides, use ketoconazole cream daily as a maintenance. This is NOT a steroid, is safe for long-term everyday use. If you use this daily this will keep the rash on your face under control and help prevent flares of the flaking.

## 2025-06-02 NOTE — TELEPHONE ENCOUNTER
Pharmacy called regarding denial for pratropium-albuterol (DUO-NEB) 0.5-2.5 mg/3 mL nebulizer solution and budesonide (PULMICORT) 0.5 mg/2 mL nebulizer solution     Please advise on what is needed for refills.   Call Mallory at [facility] [address] (Ph: [phone])

## 2025-06-02 NOTE — TELEPHONE ENCOUNTER
LMOM for pharmacy that requested medication was discontinued 8/5/2024 and that is why they are not being approved.

## 2025-06-10 ENCOUNTER — OFFICE VISIT (OUTPATIENT)
Dept: FAMILY MEDICINE CLINIC | Facility: CLINIC | Age: 65
End: 2025-06-10
Payer: MEDICARE

## 2025-06-10 VITALS
HEIGHT: 60 IN | OXYGEN SATURATION: 95 % | BODY MASS INDEX: 19.52 KG/M2 | HEART RATE: 126 BPM | SYSTOLIC BLOOD PRESSURE: 128 MMHG | RESPIRATION RATE: 20 BRPM | TEMPERATURE: 97.7 F | WEIGHT: 99.4 LBS | DIASTOLIC BLOOD PRESSURE: 70 MMHG

## 2025-06-10 DIAGNOSIS — R00.0 TACHYCARDIA: ICD-10-CM

## 2025-06-10 DIAGNOSIS — R09.81 NASAL CONGESTION: Primary | ICD-10-CM

## 2025-06-10 DIAGNOSIS — R42 DIZZINESS: ICD-10-CM

## 2025-06-10 DIAGNOSIS — L21.9 SEBORRHEIC DERMATITIS: ICD-10-CM

## 2025-06-10 LAB
SARS-COV-2 AG UPPER RESP QL IA: NEGATIVE
VALID CONTROL: NORMAL

## 2025-06-10 PROCEDURE — 87811 SARS-COV-2 COVID19 W/OPTIC: CPT | Performed by: FAMILY MEDICINE

## 2025-06-10 PROCEDURE — G2211 COMPLEX E/M VISIT ADD ON: HCPCS | Performed by: FAMILY MEDICINE

## 2025-06-10 PROCEDURE — 99214 OFFICE O/P EST MOD 30 MIN: CPT | Performed by: FAMILY MEDICINE

## 2025-06-10 RX ORDER — METOPROLOL SUCCINATE 25 MG/1
12.5 TABLET, EXTENDED RELEASE ORAL EVERY EVENING
Qty: 30 TABLET | Refills: 3 | Status: SHIPPED | OUTPATIENT
Start: 2025-06-10

## 2025-06-10 NOTE — TELEPHONE ENCOUNTER
Reason for call:   [x] Refill   [] Prior Auth  [] Other:     Office:   [] PCP/Provider -   [x] Specialty/Provider -  [facility]  Authorized By: Daya Guillen MD    Medication:       clobetasol (TEMOVATE) 0.05 % external solution       hydrocortisone 2.5 % ointment 2 times daily       Pharmacy: [facility] [address] [phone]     Local Pharmacy   Does the patient have enough for 3 days?   [] Yes   [x] No - Send as HP to POD    Mail Away Pharmacy   Does the patient have enough for 10 days?   [] Yes   [x] No - Send as HP to POD

## 2025-06-10 NOTE — PROGRESS NOTES
"Name: Jhony Bruno      : 1960      MRN: [id number]  Encounter Provider: Laura Christopher DO  Encounter Date: 6/10/2025   Encounter department: [facility]  :  Continue Mucinex force fluids increase eating    If he gets worse let me know    Cut metoprolol in half with a pill cutter so he takes 12.5 mg each evening    Recheck as scheduled or needed  Assessment & Plan  Nasal congestion  Appears to be just from a viral infection, COVID is negative  Orders:    POCT Rapid Covid Ag    Tachycardia    Orders:    metoprolol succinate (TOPROL-XL) 25 mg 24 hr tablet; Take 0.5 tablets (12.5 mg total) by mouth every evening    Dizziness  Sister notes that he is falling frequently and complains of dizziness thought maybe it was Wellbutrin  Is on metoprolol more for tachycardia than anything else  We will try half a dose at 12.5 mg and see if it makes any difference              History of Present Illness   Patient is here with a caregiver with a couple day history of congestion and coughing and not wanting to eat.  Not feeling well  His sister who is usually with him is away and sometimes this happens as a result  Nobody really around him is necessarily ill      Review of Systems  See HPI  Objective   /70   Pulse (!) 126   Temp 97.7 °F (36.5 °C)   Resp 20   Ht 4' 6.75\" (1.391 m)   Wt 45.1 kg (99 lb 6.4 oz)   SpO2 95%   BMI 23.31 kg/m²      Physical Exam  Constitutional  Appears healthy, Looks well, Appearance consistent with age    Mental Status  Alert, Oriented, Cooperative    HEENT  Turbinates are open dry pharynx dry    Neck  No neck mass, No thyromegaly, Good carotid upstrokes bilaterally, trachea midline positive click    Respiratory  Breath sounds normal, No rales, No rhonchi, No wheezing, normal palpation    Cardiac  Regular rhythm without ectopy or murmur no S3-S4, no heave lift or thrill to palpation    Vascular  No leg edema, No pedal edema    Muscular " skeletal  No clubbing cyanosis , muscle tone normal    Skin  No appreciable rashes or abnormal appearing lesions

## 2025-06-10 NOTE — PATIENT INSTRUCTIONS
Continue Mucinex force fluids increase eating    If he gets worse let me know    Cut metoprolol in half with a pill cutter so he takes 12.5 mg each evening    Recheck as scheduled or needed

## 2025-06-11 RX ORDER — HYDROCORTISONE 25 MG/G
OINTMENT TOPICAL 2 TIMES DAILY
Qty: 30 G | Refills: 0 | Status: SHIPPED | OUTPATIENT
Start: 2025-06-11

## 2025-06-11 RX ORDER — CLOBETASOL PROPIONATE 0.5 MG/ML
SOLUTION TOPICAL
Qty: 50 ML | Refills: 5 | Status: SHIPPED | OUTPATIENT
Start: 2025-06-11

## 2025-06-17 ENCOUNTER — OFFICE VISIT (OUTPATIENT)
Dept: URGENT CARE | Facility: CLINIC | Age: 65
End: 2025-06-17
Payer: MEDICARE

## 2025-06-17 ENCOUNTER — APPOINTMENT (OUTPATIENT)
Dept: RADIOLOGY | Facility: CLINIC | Age: 65
End: 2025-06-17
Payer: MEDICARE

## 2025-06-17 VITALS
WEIGHT: 101.4 LBS | OXYGEN SATURATION: 97 % | DIASTOLIC BLOOD PRESSURE: 64 MMHG | RESPIRATION RATE: 15 BRPM | SYSTOLIC BLOOD PRESSURE: 125 MMHG | HEART RATE: 83 BPM | BODY MASS INDEX: 23.78 KG/M2

## 2025-06-17 DIAGNOSIS — S69.91XA RIGHT WRIST INJURY, INITIAL ENCOUNTER: Primary | ICD-10-CM

## 2025-06-17 DIAGNOSIS — S69.91XA RIGHT WRIST INJURY, INITIAL ENCOUNTER: ICD-10-CM

## 2025-06-17 PROCEDURE — 73110 X-RAY EXAM OF WRIST: CPT

## 2025-06-17 PROCEDURE — G0463 HOSPITAL OUTPT CLINIC VISIT: HCPCS

## 2025-06-17 PROCEDURE — 99214 OFFICE O/P EST MOD 30 MIN: CPT

## 2025-06-17 PROCEDURE — 29125 APPL SHORT ARM SPLINT STATIC: CPT

## 2025-06-17 NOTE — PATIENT INSTRUCTIONS
The final xray result will appear in your mychart; use the brace until you get the xray result, if the final xray shows a fracture, keep the brace on until you follow-up with orthopedics, if the xray shows no fracture, you can use the brace  as needed; take off every 1-2 hours and can take it off to sleep and shower if there is no fracture.   The final result of the xray will appear in your my chart.  Ice as needed.  Rest.  Elevate.  Continue pain medications as previously directed from PCP as needed.   PCP follow-up in 3-5 days  Proceed to the ER if symptoms worsen.

## 2025-06-17 NOTE — PROGRESS NOTES
[facility]  Name: Jhony Bruno      : 1960      MRN: [id number]  Encounter Provider: BRIANNE Tavarez  Encounter Date: 2025   Encounter department: [facility] [place]  :  Assessment & Plan  Right wrist injury, initial encounter    Orders:    XR wrist 3+ vw right; Future    Ambulatory Referral to Orthopedic Surgery; Future    Wet read x-ray shows no acute bony abnormality. Will monitor for final read.     Patient Instructions    The final xray result will appear in your mychart; use the brace until you get the xray result, if the final xray shows a fracture, keep the brace on until you follow-up with orthopedics, if the xray shows no fracture, you can use the brace  as needed; take off every 1-2 hours and can take it off to sleep and shower if there is no fracture.   The final result of the xray will appear in your my chart.  Ice as needed.  Rest.  Elevate.  Continue pain medications as previously directed from PCP as needed.   PCP follow-up in 3-5 days  Proceed to the ER if symptoms worsen.   Chief Complaint:   Chief Complaint   Patient presents with    Fall     Pt states he fell last week in his room and landed on his right wrist. Denies swelling. Pain 8/10 with some numbness.      History of Present Illness   Patient is a 64-year-old male who presents with group home staff member at bedside. Reports last week he fell. States initially he was complaining of back pain and hip pain. States those resolved but now is reporting right wrist pain. Reports numbness to right wrist. Denies hitting his head. Denies change in mental status. Denies vomiting.     Fall  Associated symptoms include numbness. Pertinent negatives include no headaches or vomiting.         Review of Systems   Gastrointestinal:  Negative for vomiting.   Musculoskeletal:  Positive for arthralgias and joint swelling. Negative for back pain and neck pain.   Skin:  Negative for color change and wound.    Neurological:  Positive for numbness. Negative for headaches.   All other systems reviewed and are negative.    Past Medical History   Past Medical History[1]  Past Surgical History[2]  Family History[3]  he reports that he has never smoked. He has never been exposed to tobacco smoke. He has never used smokeless tobacco. He reports that he does not currently use alcohol. He reports that he does not use drugs.  Current Outpatient Medications   Medication Instructions    acetaminophen (TYLENOL) 325 mg tablet TAKE 2 TABLETS (650MG) BY MOUTH EVERY 4 HOURS AS NEEDED FOR MODERATE PAIN OR FEVER    albuterol 2.5 mg, Nebulization, Every 6 hours PRN    buPROPion (WELLBUTRIN XL) 150 mg, Oral, Every morning    calcium carbonate (Antacid Ultra Strength) 1000 MG chewable tablet CHEW AND SWALLOW 1 TABLET BY MOUTH TWICE DAILY AT 7AM AND 9PM FOR RESTLESS LEG    Cholecalciferol (Vitamin D3) 50 MCG (2000 UT) TABS TAKE 1 TABLET BY MOUTH ONCE EVERY DAY AT 7AM FOR SUPPLEMENT    clobetasol (TEMOVATE) 0.05 % external solution Apply BID for up to 2 weeks to affected skin on scalp prn flares then take one week break and can repeat regimen prn flares. Do not apply to groin, skin folds, face.    clotrimazole (LOTRIMIN) 1 % cream APPLY TOPICALLY TWO TIMES DAILY AS NEEDED FOR ITCHY RASH    dextromethorphan-guaifenesin (MUCINEX DM)  MG per 12 hr tablet 1 tablet, As needed    Ergocalciferol (Vitamin D2) 50 MCG (2000 UT) TABS 1 daily    guaiFENesin (Mucus Relief) 600 mg 12 hr tablet TAKE 1 TABLET BY MOUTH EVERY 12 HOURS AT 7AM AND 7PM FOR CHRONIC BRONCHITIS    hydrocortisone 2.5 % ointment Topical, 2 times daily, For no longer than 5-7 days to affected skin on the face. Use only for flares, and take 1-2 week break between using.    ketoconazole (NIZORAL) 2 % cream APPLY TOPICALLY ONCE EVERY DAY TO THE AFFECTED SKIN ON THE FACE WHEN FLARING AND 3 TO 4 TIMES PER WEEK FOR MAINTENANCE WHEN UNDER GOOD CONTROL FOR FUNGUS    ketoconazole (NIZORAL)  2 % shampoo USE AS SHAMPOO AT LEAST 3 TIMES PER WEEK AT 9PM -LATHER INTO SCALP AND LET SIT FOR 5 MINUTES BEFORE RINSING FOR FUNGUS    loratadine (CLARITIN) 10 mg tablet TAKE 1 TABLET BY MOUTH ONCE DAILY AS NEEDED FOR ALLERGIES    metoprolol succinate (TOPROL-XL) 12.5 mg, Oral, Every evening    mometasone (ELOCON) 0.1 % cream Topical, Daily PRN    nystatin (MYCOSTATIN) powder Topical, 2 times daily, For 14 days or until rash is gone    olopatadine (PATANOL) 0.1 % ophthalmic solution INSTILL 1 DROP INTO EACH EYE TWO TIMES DAILY AS NEEDED FOR ALLERGIES    omeprazole (PriLOSEC) 40 MG capsule TAKE 1 CAPSULE BY MOUTH ONCE EVERY DAY FOR GERD    polyethylene glycol (GLYCOLAX) 17 GM/SCOOP MIX 17 GRAMS IN 8 OZ OF FLUID AND TAKE BY MOUTH ONCE EVERY DAY FOR CONSTIPATION    polyethylene glycol (MIRALAX) 17 g, Oral, Daily    simvastatin (ZOCOR) 40 mg tablet TAKE 1 TABLET BY MOUTH EVERY NIGHT AT BEDTIME FOR HYPERCHOLESTEREMIA    terbinafine (LamISIL) 1 % cream Topical, 2 times daily, On the feet, in between the toes and on the toe nails    urea (CARMOL) 40 % Use once a day to affected area with thicker skin until tube is finished.   Allergies[4]     Objective   /64   Pulse 83   Resp 15   Wt 46 kg (101 lb 6.4 oz)   SpO2 97%   BMI 23.78 kg/m²      Physical Exam  Vitals and nursing note reviewed.   Constitutional:       General: He is not in acute distress.     Appearance: Normal appearance. He is not ill-appearing, toxic-appearing or diaphoretic.     Musculoskeletal:      Right wrist: Tenderness and bony tenderness present. No swelling, deformity or lacerations. Decreased range of motion. Normal pulse.      Right hand: Normal.      Right hip: Normal.      Left hip: Normal.      Right upper leg: Normal.      Left upper leg: Normal.      Right knee: Normal.      Left knee: Normal.     Skin:     General: Skin is warm.      Capillary Refill: Capillary refill takes less than 2 seconds.     Neurological:      Mental Status: He  "is alert.     Psychiatric:         Mood and Affect: Mood normal.         Behavior: Behavior normal.         Thought Content: Thought content normal.         Judgment: Judgment normal.     Orthopedic injury treatment    Date/Time: 6/17/2025 2:30 PM    Performed by: BRIANNE Rodriguez  Authorized by: BRIANNE Rodriguez    Patient Location:  [facility] Protocol:  Procedure performed by: (negra HODGE)  Consent: Verbal consent obtained  Risks and benefits: risks, benefits and alternatives were discussed  Consent given by: staff member at bedside.  Patient understanding: patient states understanding of the procedure being performed  Radiology Images displayed and confirmed. If images not available, report reviewed: imaging studies available  Patient identity confirmed: verbally with patient    Injury location:  Wrist  Location details:  Right wrist  Injury type:  Soft tissue (wet read)  Neurovascular status: Neurovascularly intact    Distal perfusion: normal    Neurological function: normal    Range of motion: reduced    Splint type: quick fit w.t.o right wrist static.  Neurovascular status: Neurovascularly intact    Distal perfusion: normal    Neurological function: normal    Range of motion: unchanged    Patient tolerance:  Patient tolerated the procedure well with no immediate complications        Portions of the record may have been created with voice recognition software.  Occasional wrong word or \"sound a like\" substitutions may have occurred due to the inherent limitations of voice recognition software.  Read the chart carefully and recognize, using context, where substitutions have occurred.       [1]   Past Medical History:  Diagnosis Date    Anxiety 11/15/2016    Last Assessment & Plan:   Formatting of this note might be different from the original.  Agree with attempt to taper. Take zoloft 50mg every other day for a week, every 3rd day the following week. Then, discontinue it.      Depression     " "Dysphagia 06/23/2023    GERD (gastroesophageal reflux disease)     Hypoxia 07/03/2023    Inhalation of liquid or vomitus, lower respiratory tract 07/03/2023    Intellectual disability     Reactive depression 06/23/2023    Scoliosis, unspecified scoliosis type, unspecified spinal region    [2]   Past Surgical History:  Procedure Laterality Date    APPENDECTOMY      CHOLECYSTECTOMY      HERNIA REPAIR      PANCREAS SURGERY N/A     Removal of a mass perhaps 2018    PROSTATE SURGERY N/A     \"Laser prostate surgery \"   [3]   Family History  Problem Relation Name Age of Onset    Heart disease Mother      Parkinsonism Mother      Heart disease Father      Alcohol abuse Neg Hx      Substance Abuse Neg Hx      Mental illness Neg Hx     [4]   Allergies  Allergen Reactions    Levofloxacin Itching    Codeine Nausea Only     Sister is unsure of actual reaction.    Augmentin [Amoxicillin-Pot Clavulanate] Diarrhea     "

## 2025-06-25 ENCOUNTER — TELEPHONE (OUTPATIENT)
Age: 65
End: 2025-06-25

## 2025-06-26 ENCOUNTER — OFFICE VISIT (OUTPATIENT)
Dept: FAMILY MEDICINE CLINIC | Facility: CLINIC | Age: 65
End: 2025-06-26
Payer: MEDICARE

## 2025-06-26 VITALS
TEMPERATURE: 98.4 F | HEIGHT: 60 IN | WEIGHT: 99 LBS | RESPIRATION RATE: 14 BRPM | DIASTOLIC BLOOD PRESSURE: 74 MMHG | SYSTOLIC BLOOD PRESSURE: 122 MMHG | BODY MASS INDEX: 19.44 KG/M2 | OXYGEN SATURATION: 98 % | HEART RATE: 88 BPM

## 2025-06-26 DIAGNOSIS — M25.531 RIGHT WRIST PAIN: Primary | ICD-10-CM

## 2025-06-26 DIAGNOSIS — M41.9 KYPHOSCOLIOSIS AND SCOLIOSIS: ICD-10-CM

## 2025-06-26 PROCEDURE — 99213 OFFICE O/P EST LOW 20 MIN: CPT | Performed by: NURSE PRACTITIONER

## 2025-06-26 PROCEDURE — G2211 COMPLEX E/M VISIT ADD ON: HCPCS | Performed by: NURSE PRACTITIONER

## 2025-06-26 NOTE — ASSESSMENT & PLAN NOTE
Pt will try using body pillow to cushion his left side during sleep. Patient is encouraged to call our office for any questions/concerns, persistent or worsening symptoms. Patient states they understand and agree with treatment plan.         Pt to f/u PRn.

## 2025-06-26 NOTE — PROGRESS NOTES
"Name: Jhony Bruno      : 1960      MRN: [id number]  Encounter Provider: BRIANNE Stovall  Encounter Date: 2025   Encounter department: [facility]  :  Assessment & Plan  Right wrist pain       Pt will continue using right wrist splint as well as RICE protocol. This should hopefully continue to improve. If not, we can consider OT. Patient is encouraged to call our office for any questions/concerns, persistent or worsening symptoms. Patient states they understand and agree with treatment plan.   Kyphoscoliosis and scoliosis       Pt will try using body pillow to cushion his left side during sleep. Patient is encouraged to call our office for any questions/concerns, persistent or worsening symptoms. Patient states they understand and agree with treatment plan.         Pt to f/u PRn.         History of Present Illness   Pt presents today accompanied by his sister and caregiver.  His sister reports he fell several weeks ago and a desk chair fell on his right wrist.  He did go to [facility] Care on  and had negative xray.  His sister reports that he plays pacman very often using his right hand to toggle during the game.  Pt believes he may have carpal tunnel.  Pt is wearing wrist splint.    Pt also notes difficulty staying asleep d/t back pain.  He has hx of severe scoliosis.  Pt tried using a wedge pillow but did not like it.      Review of Systems  As noted per HPI.  Objective   /74   Pulse 88   Temp 98.4 °F (36.9 °C)   Resp 14   Ht 4' 6.75\" (1.391 m)   Wt 44.9 kg (99 lb)   SpO2 98%   BMI 23.22 kg/m²      Physical Exam  Vitals reviewed.   Constitutional:       Appearance: Normal appearance.     Musculoskeletal:      Right wrist: Tenderness (dorsal aspect of wrist) present. Normal range of motion.      Comments: scoliosis     Neurological:      Mental Status: He is alert and oriented to person, place, and time.     Psychiatric:         " Mood and Affect: Mood normal.         Behavior: Behavior normal.         Thought Content: Thought content normal.         Judgment: Judgment normal.

## 2025-07-25 DIAGNOSIS — G25.81 RESTLESS LEG: ICD-10-CM

## 2025-07-31 DIAGNOSIS — J41.8 MIXED SIMPLE AND MUCOPURULENT CHRONIC BRONCHITIS (HCC): ICD-10-CM

## 2025-07-31 RX ORDER — GUAIFENESIN 600 MG/1
600 TABLET, EXTENDED RELEASE ORAL 2 TIMES DAILY
Qty: 200 TABLET | Refills: 3 | Status: SHIPPED | OUTPATIENT
Start: 2025-07-31

## 2025-08-21 DIAGNOSIS — G25.81 RESTLESS LEG: ICD-10-CM
